# Patient Record
Sex: MALE | Race: ASIAN | Employment: UNEMPLOYED | URBAN - METROPOLITAN AREA
[De-identification: names, ages, dates, MRNs, and addresses within clinical notes are randomized per-mention and may not be internally consistent; named-entity substitution may affect disease eponyms.]

---

## 2017-02-16 ENCOUNTER — OFFICE VISIT (OUTPATIENT)
Dept: INTERNAL MEDICINE CLINIC | Age: 37
End: 2017-02-16

## 2017-02-16 VITALS
SYSTOLIC BLOOD PRESSURE: 162 MMHG | TEMPERATURE: 98 F | OXYGEN SATURATION: 97 % | HEIGHT: 63 IN | DIASTOLIC BLOOD PRESSURE: 107 MMHG | HEART RATE: 70 BPM | RESPIRATION RATE: 16 BRPM | BODY MASS INDEX: 32.39 KG/M2 | WEIGHT: 182.8 LBS

## 2017-02-16 DIAGNOSIS — Z00.00 WELL ADULT EXAM: Primary | ICD-10-CM

## 2017-02-16 NOTE — PATIENT INSTRUCTIONS
Learning About Living Avril  What is a living will? A living will is a legal form you use to write down the kind of care you want at the end of your life. It is used by the health professionals who will treat you if you aren't able to decide for yourself. If you put your wishes in writing, your loved ones and others will know what kind of care you want. They won't need to guess. This can ease your mind and be helpful to others. A living will is not the same as an estate or property will. An estate will explains what you want to happen with your money and property after you die. Is a living will a legal document? A living will is a legal document. Each state has its own laws about living amaya. If you move to another state, make sure that your living will is legal in the state where you now live. Or you might use a universal form that has been approved by many states. This kind of form can sometimes be completed and stored online. Your electronic copy will then be available wherever you have a connection to the Internet. In most cases, doctors will respect your wishes even if you have a form from a different state. · You don't need an  to complete a living will. But legal advice can be helpful if your state's laws are unclear, your health history is complicated, or your family can't agree on what should be in your living will. · You can change your living will at any time. Some people find that their wishes about end-of-life care change as their health changes. · In addition to making a living will, think about completing a medical power of  form. This form lets you name the person you want to make end-of-life treatment decisions for you (your \"health care agent\") if you're not able to. Many hospitals and nursing homes will give you the forms you need to complete a living will and a medical power of .   · Your living will is used only if you can't make or communicate decisions for yourself anymore. If you become able to make decisions again, you can accept or refuse any treatment, no matter what you wrote in your living will. · Your state may offer an online registry. This is a place where you can store your living will online so the doctors and nurses who need to treat you can find it right away. What should you think about when creating a living will? Talk about your end-of-life wishes with your family members and your doctor. Let them know what you want. That way the people making decisions for you won't be surprised by your choices. Think about these questions as you make your living will:  · Do you know enough about life support methods that might be used? If not, talk to your doctor so you know what might be done if you can't breathe on your own, your heart stops, or you're unable to swallow. · What things would you still want to be able to do after you receive life-support methods? Would you want to be able to walk? To speak? To eat on your own? To live without the help of machines? · If you have a choice, where do you want to be cared for? In your home? At a hospital or nursing home? · Do you want certain Jew practices performed if you become very ill? · If you have a choice at the end of your life, where would you prefer to die? At home? In a hospital or nursing home? Somewhere else? · Would you prefer to be buried or cremated? · Do you want your organs to be donated after you die? What should you do with your living will? · Make sure that your family members and your health care agent have copies of your living will. · Give your doctor a copy of your living will to keep in your medical record. If you have more than one doctor, make sure that each one has a copy. · You may want to put a copy of your living will where it can be easily found. Where can you learn more? Go to http://joby-carter.info/.   Enter I732 in the search box to learn more about \"Learning About Living Avril. \"  Current as of: February 24, 2016  Content Version: 11.1  © 6344-7515 [employer]. Care instructions adapted under license by [employer] (which disclaims liability or warranty for this information). If you have questions about a medical condition or this instruction, always ask your healthcare professional. [address] any warranty or liability for your use of this information. Well Visit, Ages 25 to 48: Care Instructions  Your Care Instructions  Physical exams can help you stay healthy. Your doctor has checked your overall health and may have suggested ways to take good care of yourself. He or she also may have recommended tests. At home, you can help prevent illness with healthy eating, regular exercise, and other steps. Follow-up care is a key part of your treatment and safety. Be sure to make and go to all appointments, and call your doctor if you are having problems. It's also a good idea to know your test results and keep a list of the medicines you take. How can you care for yourself at home? · Reach and stay at a healthy weight. This will lower your risk for many problems, such as obesity, diabetes, heart disease, and high blood pressure. · Get at least 30 minutes of physical activity on most days of the week. Walking is a good choice. You also may want to do other activities, such as running, swimming, cycling, or playing tennis or team sports. Discuss any changes in your exercise program with your doctor. · Do not smoke or allow others to smoke around you. If you need help quitting, talk to your doctor about stop-smoking programs and medicines. These can increase your chances of quitting for good. · Talk to your doctor about whether you have any risk factors for sexually transmitted infections (STIs).  Having one sex partner (who does not have STIs and does not have sex with anyone else) is a good way to avoid these infections. · Use birth control if you do not want to have children at this time. Talk with your doctor about the choices available and what might be best for you. · Protect your skin from too much sun. When you're outdoors from 10 a.m. to 4 p.m., stay in the shade or cover up with clothing and a hat with a wide brim. Wear sunglasses that block UV rays. Even when it's cloudy, put broad-spectrum sunscreen (SPF 30 or higher) on any exposed skin. · See a dentist one or two times a year for checkups and to have your teeth cleaned. · Wear a seat belt in the car. · Drink alcohol in moderation, if at all. That means no more than 2 drinks a day for men and 1 drink a day for women. Follow your doctor's advice about when to have certain tests. These tests can spot problems early. For everyone  · Cholesterol. Have the fat (cholesterol) in your blood tested after age 21. Your doctor will tell you how often to have this done based on your age, family history, or other things that can increase your risk for heart disease. · Blood pressure. Have your blood pressure checked during a routine doctor visit. Your doctor will tell you how often to check your blood pressure based on your age, your blood pressure results, and other factors. · Vision. Talk with your doctor about how often to have a glaucoma test.  · Diabetes. Ask your doctor whether you should have tests for diabetes. · Colon cancer. Have a test for colon cancer at age 48. You may have one of several tests. If you are younger than 48, you may need a test earlier if you have any risk factors. Risk factors include whether you already had a precancerous polyp removed from your colon or whether your parent, brother, sister, or child has had colon cancer.   For women  · Breast exam and mammogram. Talk to your doctor about when you should have a clinical breast exam and a mammogram. Medical experts differ on whether and how often women under 50 should have these tests. Your doctor can help you decide what is right for you. · Pap test and pelvic exam. Begin Pap tests at age 24. A Pap test is the best way to find cervical cancer. The test often is part of a pelvic exam. Ask how often to have this test.  · Tests for sexually transmitted infections (STIs). Ask whether you should have tests for STIs. You may be at risk if you have sex with more than one person, especially if your partners do not wear condoms. For men  · Tests for sexually transmitted infections (STIs). Ask whether you should have tests for STIs. You may be at risk if you have sex with more than one person, especially if you do not wear a condom. · Testicular cancer exam. Ask your doctor whether you should check your testicles regularly. · Prostate exam. Talk to your doctor about whether you should have a blood test (called a PSA test) for prostate cancer. Experts differ on whether and when men should have this test. Some experts suggest it if you are older than 39 and are -American or have a father or brother who got prostate cancer when he was younger than 72. When should you call for help? Watch closely for changes in your health, and be sure to contact your doctor if you have any problems or symptoms that concern you. Where can you learn more? Go to http://joby-carter.info/. Enter P072 in the search box to learn more about \"Well Visit, Ages 25 to 48: Care Instructions. \"  Current as of: July 19, 2016  Content Version: 11.1  © 7730-2720 [employer], [employer]. Care instructions adapted under license by [employer] (which disclaims liability or warranty for this information). If you have questions about a medical condition or this instruction, always ask your healthcare professional. [place] [address] any warranty or liability for your use of this information.        DASH Diet: Care Instructions  Your Care Instructions  The DASH diet is an eating plan that can help lower your blood pressure. DASH stands for Dietary Approaches to Stop Hypertension. Hypertension is high blood pressure. The DASH diet focuses on eating foods that are high in calcium, potassium, and magnesium. These nutrients can lower blood pressure. The foods that are highest in these nutrients are fruits, vegetables, low-fat dairy products, nuts, seeds, and legumes. But taking calcium, potassium, and magnesium supplements instead of eating foods that are high in those nutrients does not have the same effect. The DASH diet also includes whole grains, fish, and poultry. The DASH diet is one of several lifestyle changes your doctor may recommend to lower your high blood pressure. Your doctor may also want you to decrease the amount of sodium in your diet. Lowering sodium while following the DASH diet can lower blood pressure even further than just the DASH diet alone. Follow-up care is a key part of your treatment and safety. Be sure to make and go to all appointments, and call your doctor if you are having problems. It's also a good idea to know your test results and keep a list of the medicines you take. How can you care for yourself at home? Following the DASH diet  · Eat 4 to 5 servings of fruit each day. A serving is 1 medium-sized piece of fruit, ½ cup chopped or canned fruit, 1/4 cup dried fruit, or 4 ounces (½ cup) of fruit juice. Choose fruit more often than fruit juice. · Eat 4 to 5 servings of vegetables each day. A serving is 1 cup of lettuce or raw leafy vegetables, ½ cup of chopped or cooked vegetables, or 4 ounces (½ cup) of vegetable juice. Choose vegetables more often than vegetable juice. · Get 2 to 3 servings of low-fat and fat-free dairy each day. A serving is 8 ounces of milk, 1 cup of yogurt, or 1 ½ ounces of cheese. · Eat 6 to 8 servings of grains each day.  A serving is 1 slice of bread, 1 ounce of dry cereal, or ½ cup of cooked rice, pasta, or cooked cereal. Try to choose whole-grain products as much as possible. · Limit lean meat, poultry, and fish to 2 servings each day. A serving is 3 ounces, about the size of a deck of cards. · Eat 4 to 5 servings of nuts, seeds, and legumes (cooked dried beans, lentils, and split peas) each week. A serving is 1/3 cup of nuts, 2 tablespoons of seeds, or ½ cup of cooked beans or peas. · Limit fats and oils to 2 to 3 servings each day. A serving is 1 teaspoon of vegetable oil or 2 tablespoons of salad dressing. · Limit sweets and added sugars to 5 servings or less a week. A serving is 1 tablespoon jelly or jam, ½ cup sorbet, or 1 cup of lemonade. · Eat less than 2,300 milligrams (mg) of sodium a day. If you limit your sodium to 1,500 mg a day, you can lower your blood pressure even more. Tips for success  · Start small. Do not try to make dramatic changes to your diet all at once. You might feel that you are missing out on your favorite foods and then be more likely to not follow the plan. Make small changes, and stick with them. Once those changes become habit, add a few more changes. · Try some of the following:  ¨ Make it a goal to eat a fruit or vegetable at every meal and at snacks. This will make it easy to get the recommended amount of fruits and vegetables each day. ¨ Try yogurt topped with fruit and nuts for a snack or healthy dessert. ¨ Add lettuce, tomato, cucumber, and onion to sandwiches. ¨ Combine a ready-made pizza crust with low-fat mozzarella cheese and lots of vegetable toppings. Try using tomatoes, squash, spinach, broccoli, carrots, cauliflower, and onions. ¨ Have a variety of cut-up vegetables with a low-fat dip as an appetizer instead of chips and dip. ¨ Sprinkle sunflower seeds or chopped almonds over salads. Or try adding chopped walnuts or almonds to cooked vegetables. ¨ Try some vegetarian meals using beans and peas. Add garbanzo or kidney beans to salads.  Make burritos and tacos with mashed davis beans or black beans. Where can you learn more? Go to http://joby-carter.info/. Enter U704 in the search box to learn more about \"DASH Diet: Care Instructions. \"  Current as of: March 23, 2016  Content Version: 11.1  © 5950-4392 [employer]. Care instructions adapted under license by [employer] (which disclaims liability or warranty for this information). If you have questions about a medical condition or this instruction, always ask your healthcare professional. [address] any warranty or liability for your use of this information. Learning About High Blood Pressure  What is high blood pressure? Blood pressure is a measure of how hard the blood pushes against the walls of your arteries. It's normal for blood pressure to go up and down throughout the day, but if it stays up, you have high blood pressure. Another name for high blood pressure is hypertension. Two numbers tell you your blood pressure. The first number is the systolic pressure. It shows how hard the blood pushes when your heart is pumping. The second number is the diastolic pressure. It shows how hard the blood pushes between heartbeats, when your heart is relaxed and filling with blood. A blood pressure of less than 120/80 (say \"120 over 80\") is ideal for an adult. High blood pressure is 140/90 or higher. You have high blood pressure if your top number is 140 or higher or your bottom number is 90 or higher, or both. Many people fall into the category in between, called prehypertension. People with prehypertension need to make lifestyle changes to bring their blood pressure down and help prevent or delay high blood pressure. What happens when you have high blood pressure? · Blood flows through your arteries with too much force. Over time, this damages the walls of your arteries. But you can't feel it. High blood pressure usually doesn't cause symptoms.   · Fat and calcium start to build up in your arteries. This buildup is called plaque. Plaque makes your arteries narrower and stiffer. Blood can't flow through them as easily. · This lack of good blood flow starts to damage some of the organs in your body. This can lead to problems such as coronary artery disease and heart attack, heart failure, stroke, kidney failure, and eye damage. How can you prevent high blood pressure? · Stay at a healthy weight. · Try to limit how much sodium you eat to less than 2,300 milligrams (mg) a day. If you limit your sodium to 1,500 mg a day, you can lower your blood pressure even more. ¨ Buy foods that are labeled \"unsalted,\" \"sodium-free,\" or \"low-sodium. \" Foods labeled \"reduced-sodium\" and \"light sodium\" may still have too much sodium. ¨ Flavor your food with garlic, lemon juice, onion, vinegar, herbs, and spices instead of salt. Do not use soy sauce, steak sauce, onion salt, garlic salt, mustard, or ketchup on your food. ¨ Use less salt (or none) when recipes call for it. You can often use half the salt a recipe calls for without losing flavor. · Be physically active. Get at least 30 minutes of exercise on most days of the week. Walking is a good choice. You also may want to do other activities, such as running, swimming, cycling, or playing tennis or team sports. · Limit alcohol to 2 drinks a day for men and 1 drink a day for women. · Eat plenty of fruits, vegetables, and low-fat dairy products. Eat less saturated and total fats. How is high blood pressure treated? · Your doctor will suggest making lifestyle changes. For example, your doctor may ask you to eat healthy foods, quit smoking, lose extra weight, and be more active. · If lifestyle changes don't help enough or your blood pressure is very high, you will have to take medicine every day. Follow-up care is a key part of your treatment and safety.  Be sure to make and go to all appointments, and call your doctor if you are having problems. It's also a good idea to know your test results and keep a list of the medicines you take. Where can you learn more? Go to http://joby-carter.info/. Enter P501 in the search box to learn more about \"Learning About High Blood Pressure. \"  Current as of: March 23, 2016  Content Version: 11.1  © 9825-5441 [employer], [employer]. Care instructions adapted under license by [email or web address] (which disclaims liability or warranty for this information). If you have questions about a medical condition or this instruction, always ask your healthcare professional. [place] [address] any warranty or liability for your use of this information.

## 2017-02-16 NOTE — PROGRESS NOTES
HISTORY OF PRESENT ILLNESS  Marlen Carson is a 29 y.o. male. HPI  Here for physical.    Last visit Feb 2015. Has had elev BP in past--elevated today as below. Had improved with diet/exercist prior. He notes no interim BP checks since last seen here. Has seen ophth and working/waiting on glasses to help with vision. He completed LTBI therapy with health dept. Release(s) completed at visit for:  Health dept LTBI tx records. Reviewed BP elevation and evaluaiton/tx. Plan follow-up as below. ROS      Blood pressure (!) 158/112, pulse 76, temperature 98 °F (36.7 °C), temperature source Oral, resp. rate 16, height 5' 2.5\" (1.588 m), weight 182 lb 12.8 oz (82.9 kg), SpO2 97 %. Vitals:    02/16/17 1002 02/16/17 1007 02/16/17 1043   BP: (!) 155/105 (!) 158/112 (!) 162/107   Pulse: 70 76 70   Resp: 16     Temp: 98 °F (36.7 °C)     TempSrc: Oral     SpO2: 97%     Weight: 182 lb 12.8 oz (82.9 kg)     Height: 5' 2.5\" (1.588 m)         Physical Exam   Constitutional: He appears well-developed and well-nourished. No distress. HENT:   Head: Normocephalic and atraumatic. Right Ear: External ear normal.   Left Ear: External ear normal.   Nose: Nose normal.   Mouth/Throat: Oropharynx is clear and moist. No oropharyngeal exudate. TM's normal bilat. Mild injection canal just superior to TM right. Eyes: Conjunctivae are normal. Right eye exhibits no discharge. Left eye exhibits no discharge. No scleral icterus. Neck: Normal range of motion. Neck supple. No tracheal deviation present. No thyromegaly present. Cardiovascular: Normal rate, regular rhythm, normal heart sounds and intact distal pulses. Exam reveals no gallop and no friction rub. No murmur heard. Pulmonary/Chest: Effort normal and breath sounds normal. No stridor. No respiratory distress. He has no wheezes. He has no rales. Abdominal: Soft. Bowel sounds are normal. He exhibits no distension. There is no tenderness.  There is no rebound and no guarding. Genitourinary:   Genitourinary Comments: Pt deferred--no concerns. Musculoskeletal: He exhibits no edema or tenderness. Lymphadenopathy:     He has no cervical adenopathy. Neurological: He is alert. He exhibits normal muscle tone. Coordination normal.   Skin: Skin is warm. No rash noted. He is not diaphoretic. No erythema. No pallor. Psychiatric: He has a normal mood and affect. His behavior is normal. Judgment and thought content normal.       ASSESSMENT and PLAN    ICD-10-CM ICD-9-CM    1. Well adult exam Z00.00 V70.0 CBC WITH AUTOMATED DIFF      METABOLIC PANEL, COMPREHENSIVE      LIPID PANEL      HEMOGLOBIN A1C WITH EAG   2. Elevated BP I10 401.9        1. [facility] labs reviewed. 2.  To return for re-evaluation in 1-2wk. Pt agreeable with plan. Reviewed routine for confirmation elevation and treatment. Reviewed interval follow-up if needs BP meds at follow-up. Follow-up Disposition:  Return in about 2 weeks (around 3/2/2017) for Blood Pressure follow-up, lab review--1-2 weeks. lab results and schedule of future lab studies reviewed with patient  reviewed diet, exercise and weight control  reviewed medications and side effects in detail    Plan and evaluation (above) reviewed with pt at visit  Patient voiced understanding of plan and provided with time to ask/review questions. After Visit Summary (AVS) provided to pt after visit with additional instructions as needed/reviewed.

## 2017-02-16 NOTE — PROGRESS NOTES
Rm 16  Pt has been fasting today  Pt bp elevated ,left arm . BP repeated in right arm still elevated  Chief Complaint   Patient presents with    Complete Physical       1. Have you been to the ER, urgent care clinic since your last visit? Hospitalized since your last visit? No    2. Have you seen or consulted any other health care providers outside of the [address] [place] [address] since your last visit? Include any pap smears or colon screening.  No    Health Maintenance Due   Topic Date Due    Pneumococcal 19-64 Medium Risk (1 of 1 - PPSV23) 05/21/2000     Living will sent to pt AVS

## 2017-02-16 NOTE — MR AVS SNAPSHOT
Visit Information Date & Time Provider Department Dept. Phone Encounter #  
 2/16/2017  9:30 AM Rosetta Schwarz, [address] [place] [address] and Internal Medicine [phone] [id number] Follow-up Instructions Return in about 2 weeks (around 3/2/2017) for Blood Pressure follow-up, lab review--1-2 weeks. Upcoming Health Maintenance Date Due Pneumococcal 19-64 Medium Risk (1 of 1 - PPSV23) 5/21/2000 DTaP/Tdap/Td series (2 - Td) 10/27/2024 Allergies as of 2/16/2017  Review Complete On: 2/16/2017 By: Rosetta Schwarz MD  
 No Known Allergies Current Immunizations  Reviewed on 2/16/2017 Name Date Hep B Vaccine 12/27/2013, 11/29/2013 Hep B Vaccine (Adult) 1/12/2015 Influenza Vaccine 11/12/2014, 10/27/2014, 4/10/2014 MMR 12/27/2013, 11/29/2013 Td 10/27/2014, 11/29/2013 Tdap 4/10/2014 Reviewed by Rosetta Schwarz MD on 2/16/2017 at 10:23 AM  
You Were Diagnosed With   
  
 Codes Comments Well adult exam    -  Primary ICD-10-CM: Z00.00 ICD-9-CM: V70.0 Elevated BP     ICD-10-CM: I10 
ICD-9-CM: 401.9 Vitals BP Pulse Temp Resp Height(growth percentile) Weight(growth percentile) (!) 162/107 70 98 °F (36.7 °C) (Oral) 16 5' 2.5\" (1.588 m) 182 lb 12.8 oz (82.9 kg) SpO2 BMI Smoking Status 97% 32.9 kg/m2 Current Some Day Smoker Vitals History BMI and BSA Data Body Mass Index Body Surface Area 32.9 kg/m 2 1.91 m 2 Preferred Pharmacy Pharmacy Name Phone [id number] [phone] Your Updated Medication List  
  
Notice  As of 2/16/2017 10:44 AM  
 You have not been prescribed any medications. We Performed the Following CBC WITH AUTOMATED DIFF [28903 CPT(R)] HEMOGLOBIN A1C WITH EAG [07429 CPT(R)] LIPID PANEL [96237 CPT(R)] METABOLIC PANEL, COMPREHENSIVE [46763 CPT(R)] Follow-up Instructions Return in about 2 weeks (around 3/2/2017) for Blood Pressure follow-up, lab review--1-2 weeks. Patient Instructions Renée Villatoro 1721 What is a living will? A living will is a legal form you use to write down the kind of care you want at the end of your life. It is used by the health professionals who will treat you if you aren't able to decide for yourself. If you put your wishes in writing, your loved ones and others will know what kind of care you want. They won't need to guess. This can ease your mind and be helpful to others. A living will is not the same as an estate or property will. An estate will explains what you want to happen with your money and property after you die. Is a living will a legal document? A living will is a legal document. Each state has its own laws about living amaya. If you move to another state, make sure that your living will is legal in the state where you now live. Or you might use a universal form that has been approved by many states. This kind of form can sometimes be completed and stored online. Your electronic copy will then be available wherever you have a connection to the Internet. In most cases, doctors will respect your wishes even if you have a form from a different state. · You don't need an  to complete a living will. But legal advice can be helpful if your state's laws are unclear, your health history is complicated, or your family can't agree on what should be in your living will. · You can change your living will at any time. Some people find that their wishes about end-of-life care change as their health changes. · In addition to making a living will, think about completing a medical power of  form. This form lets you name the person you want to make end-of-life treatment decisions for you (your \"health care agent\") if you're not able to.  Many hospitals and nursing homes will give you the forms you need to complete a living will and a medical power of . · Your living will is used only if you can't make or communicate decisions for yourself anymore. If you become able to make decisions again, you can accept or refuse any treatment, no matter what you wrote in your living will. · Your state may offer an online registry. This is a place where you can store your living will online so the doctors and nurses who need to treat you can find it right away. What should you think about when creating a living will? Talk about your end-of-life wishes with your family members and your doctor. Let them know what you want. That way the people making decisions for you won't be surprised by your choices. Think about these questions as you make your living will: · Do you know enough about life support methods that might be used? If not, talk to your doctor so you know what might be done if you can't breathe on your own, your heart stops, or you're unable to swallow. · What things would you still want to be able to do after you receive life-support methods? Would you want to be able to walk? To speak? To eat on your own? To live without the help of machines? · If you have a choice, where do you want to be cared for? In your home? At a hospital or nursing home? · Do you want certain Lutheran practices performed if you become very ill? · If you have a choice at the end of your life, where would you prefer to die? At home? In a hospital or nursing home? Somewhere else? · Would you prefer to be buried or cremated? · Do you want your organs to be donated after you die? What should you do with your living will? · Make sure that your family members and your health care agent have copies of your living will. · Give your doctor a copy of your living will to keep in your medical record. If you have more than one doctor, make sure that each one has a copy. · You may want to put a copy of your living will where it can be easily found. Where can you learn more? Go to http://joby-carter.info/. Enter G930 in the search box to learn more about \"Learning About Living Avril. \" Current as of: February 24, 2016 Content Version: 11.1 © 4154-6720 [employer]. Care instructions adapted under license by [employer] (which disclaims liability or warranty for this information). If you have questions about a medical condition or this instruction, always ask your healthcare professional. [place] [address] any warranty or liability for your use of this information. Well Visit, Ages 25 to 48: Care Instructions Your Care Instructions Physical exams can help you stay healthy. Your doctor has checked your overall health and may have suggested ways to take good care of yourself. He or she also may have recommended tests. At home, you can help prevent illness with healthy eating, regular exercise, and other steps. Follow-up care is a key part of your treatment and safety. Be sure to make and go to all appointments, and call your doctor if you are having problems. It's also a good idea to know your test results and keep a list of the medicines you take. How can you care for yourself at home? · Reach and stay at a healthy weight. This will lower your risk for many problems, such as obesity, diabetes, heart disease, and high blood pressure. · Get at least 30 minutes of physical activity on most days of the week. Walking is a good choice. You also may want to do other activities, such as running, swimming, cycling, or playing tennis or team sports. Discuss any changes in your exercise program with your doctor. · Do not smoke or allow others to smoke around you. If you need help quitting, talk to your doctor about stop-smoking programs and medicines. These can increase your chances of quitting for good. · Talk to your doctor about whether you have any risk factors for sexually transmitted infections (STIs). Having one sex partner (who does not have STIs and does not have sex with anyone else) is a good way to avoid these infections. · Use birth control if you do not want to have children at this time. Talk with your doctor about the choices available and what might be best for you. · Protect your skin from too much sun. When you're outdoors from 10 a.m. to 4 p.m., stay in the shade or cover up with clothing and a hat with a wide brim. Wear sunglasses that block UV rays. Even when it's cloudy, put broad-spectrum sunscreen (SPF 30 or higher) on any exposed skin. · See a dentist one or two times a year for checkups and to have your teeth cleaned. · Wear a seat belt in the car. · Drink alcohol in moderation, if at all. That means no more than 2 drinks a day for men and 1 drink a day for women. Follow your doctor's advice about when to have certain tests. These tests can spot problems early. For everyone · Cholesterol. Have the fat (cholesterol) in your blood tested after age 21. Your doctor will tell you how often to have this done based on your age, family history, or other things that can increase your risk for heart disease. · Blood pressure. Have your blood pressure checked during a routine doctor visit. Your doctor will tell you how often to check your blood pressure based on your age, your blood pressure results, and other factors. · Vision. Talk with your doctor about how often to have a glaucoma test. 
· Diabetes. Ask your doctor whether you should have tests for diabetes. · Colon cancer. Have a test for colon cancer at age 48. You may have one of several tests. If you are younger than 48, you may need a test earlier if you have any risk factors.  Risk factors include whether you already had a precancerous polyp removed from your colon or whether your parent, brother, sister, or child has had colon cancer. For women · Breast exam and mammogram. Talk to your doctor about when you should have a clinical breast exam and a mammogram. Medical experts differ on whether and how often women under 50 should have these tests. Your doctor can help you decide what is right for you. · Pap test and pelvic exam. Begin Pap tests at age 24. A Pap test is the best way to find cervical cancer. The test often is part of a pelvic exam. Ask how often to have this test. 
· Tests for sexually transmitted infections (STIs). Ask whether you should have tests for STIs. You may be at risk if you have sex with more than one person, especially if your partners do not wear condoms. For men · Tests for sexually transmitted infections (STIs). Ask whether you should have tests for STIs. You may be at risk if you have sex with more than one person, especially if you do not wear a condom. · Testicular cancer exam. Ask your doctor whether you should check your testicles regularly. · Prostate exam. Talk to your doctor about whether you should have a blood test (called a PSA test) for prostate cancer. Experts differ on whether and when men should have this test. Some experts suggest it if you are older than 39 and are -American or have a father or brother who got prostate cancer when he was younger than 72. When should you call for help? Watch closely for changes in your health, and be sure to contact your doctor if you have any problems or symptoms that concern you. Where can you learn more? Go to http://joby-carter.info/. Enter P072 in the search box to learn more about \"Well Visit, Ages 25 to 48: Care Instructions. \" Current as of: July 19, 2016 Content Version: 11.1 © 4452-2752 [employer].  Care instructions adapted under license by [employer] (which disclaims liability or warranty for this information). If you have questions about a medical condition or this instruction, always ask your healthcare professional. [address] any warranty or liability for your use of this information. DASH Diet: Care Instructions Your Care Instructions The DASH diet is an eating plan that can help lower your blood pressure. DASH stands for Dietary Approaches to Stop Hypertension. Hypertension is high blood pressure. The DASH diet focuses on eating foods that are high in calcium, potassium, and magnesium. These nutrients can lower blood pressure. The foods that are highest in these nutrients are fruits, vegetables, low-fat dairy products, nuts, seeds, and legumes. But taking calcium, potassium, and magnesium supplements instead of eating foods that are high in those nutrients does not have the same effect. The DASH diet also includes whole grains, fish, and poultry. The DASH diet is one of several lifestyle changes your doctor may recommend to lower your high blood pressure. Your doctor may also want you to decrease the amount of sodium in your diet. Lowering sodium while following the DASH diet can lower blood pressure even further than just the DASH diet alone. Follow-up care is a key part of your treatment and safety. Be sure to make and go to all appointments, and call your doctor if you are having problems. It's also a good idea to know your test results and keep a list of the medicines you take. How can you care for yourself at home? Following the DASH diet · Eat 4 to 5 servings of fruit each day. A serving is 1 medium-sized piece of fruit, ½ cup chopped or canned fruit, 1/4 cup dried fruit, or 4 ounces (½ cup) of fruit juice. Choose fruit more often than fruit juice. · Eat 4 to 5 servings of vegetables each day.  A serving is 1 cup of lettuce or raw leafy vegetables, ½ cup of chopped or cooked vegetables, or 4 ounces (½ cup) of vegetable juice. Choose vegetables more often than vegetable juice. · Get 2 to 3 servings of low-fat and fat-free dairy each day. A serving is 8 ounces of milk, 1 cup of yogurt, or 1 ½ ounces of cheese. · Eat 6 to 8 servings of grains each day. A serving is 1 slice of bread, 1 ounce of dry cereal, or ½ cup of cooked rice, pasta, or cooked cereal. Try to choose whole-grain products as much as possible. · Limit lean meat, poultry, and fish to 2 servings each day. A serving is 3 ounces, about the size of a deck of cards. · Eat 4 to 5 servings of nuts, seeds, and legumes (cooked dried beans, lentils, and split peas) each week. A serving is 1/3 cup of nuts, 2 tablespoons of seeds, or ½ cup of cooked beans or peas. · Limit fats and oils to 2 to 3 servings each day. A serving is 1 teaspoon of vegetable oil or 2 tablespoons of salad dressing. · Limit sweets and added sugars to 5 servings or less a week. A serving is 1 tablespoon jelly or jam, ½ cup sorbet, or 1 cup of lemonade. · Eat less than 2,300 milligrams (mg) of sodium a day. If you limit your sodium to 1,500 mg a day, you can lower your blood pressure even more. Tips for success · Start small. Do not try to make dramatic changes to your diet all at once. You might feel that you are missing out on your favorite foods and then be more likely to not follow the plan. Make small changes, and stick with them. Once those changes become habit, add a few more changes. · Try some of the following: ¨ Make it a goal to eat a fruit or vegetable at every meal and at snacks. This will make it easy to get the recommended amount of fruits and vegetables each day. ¨ Try yogurt topped with fruit and nuts for a snack or healthy dessert. ¨ Add lettuce, tomato, cucumber, and onion to sandwiches. ¨ Combine a ready-made pizza crust with low-fat mozzarella cheese and lots of vegetable toppings.  Try using tomatoes, squash, spinach, broccoli, carrots, cauliflower, and onions. ¨ Have a variety of cut-up vegetables with a low-fat dip as an appetizer instead of chips and dip. ¨ Sprinkle sunflower seeds or chopped almonds over salads. Or try adding chopped walnuts or almonds to cooked vegetables. ¨ Try some vegetarian meals using beans and peas. Add garbanzo or kidney beans to salads. Make burritos and tacos with mashed davis beans or black beans. Where can you learn more? Go to http://joby"[employer]carter.info/. Enter N055 in the search box to learn more about \"DASH Diet: Care Instructions. \" Current as of: March 23, 2016 Content Version: 11.1 © 1561-0168 [employer]. Care instructions adapted under license by [employer] (which disclaims liability or warranty for this information). If you have questions about a medical condition or this instruction, always ask your healthcare professional. [place] [address] any warranty or liability for your use of this information. Learning About High Blood Pressure What is high blood pressure? Blood pressure is a measure of how hard the blood pushes against the walls of your arteries. It's normal for blood pressure to go up and down throughout the day, but if it stays up, you have high blood pressure. Another name for high blood pressure is hypertension. Two numbers tell you your blood pressure. The first number is the systolic pressure. It shows how hard the blood pushes when your heart is pumping. The second number is the diastolic pressure. It shows how hard the blood pushes between heartbeats, when your heart is relaxed and filling with blood. A blood pressure of less than 120/80 (say \"120 over 80\") is ideal for an adult. High blood pressure is 140/90 or higher. You have high blood pressure if your top number is 140 or higher or your bottom number is 90 or higher, or both.  Many people fall into the category in between, called prehypertension. People with prehypertension need to make lifestyle changes to bring their blood pressure down and help prevent or delay high blood pressure. What happens when you have high blood pressure? · Blood flows through your arteries with too much force. Over time, this damages the walls of your arteries. But you can't feel it. High blood pressure usually doesn't cause symptoms. · Fat and calcium start to build up in your arteries. This buildup is called plaque. Plaque makes your arteries narrower and stiffer. Blood can't flow through them as easily. · This lack of good blood flow starts to damage some of the organs in your body. This can lead to problems such as coronary artery disease and heart attack, heart failure, stroke, kidney failure, and eye damage. How can you prevent high blood pressure? · Stay at a healthy weight. · Try to limit how much sodium you eat to less than 2,300 milligrams (mg) a day. If you limit your sodium to 1,500 mg a day, you can lower your blood pressure even more. ¨ Buy foods that are labeled \"unsalted,\" \"sodium-free,\" or \"low-sodium. \" Foods labeled \"reduced-sodium\" and \"light sodium\" may still have too much sodium. ¨ Flavor your food with garlic, lemon juice, onion, vinegar, herbs, and spices instead of salt. Do not use soy sauce, steak sauce, onion salt, garlic salt, mustard, or ketchup on your food. ¨ Use less salt (or none) when recipes call for it. You can often use half the salt a recipe calls for without losing flavor. · Be physically active. Get at least 30 minutes of exercise on most days of the week. Walking is a good choice. You also may want to do other activities, such as running, swimming, cycling, or playing tennis or team sports. · Limit alcohol to 2 drinks a day for men and 1 drink a day for women. · Eat plenty of fruits, vegetables, and low-fat dairy products. Eat less saturated and total fats. How is high blood pressure treated? · Your doctor will suggest making lifestyle changes. For example, your doctor may ask you to eat healthy foods, quit smoking, lose extra weight, and be more active. · If lifestyle changes don't help enough or your blood pressure is very high, you will have to take medicine every day. Follow-up care is a key part of your treatment and safety. Be sure to make and go to all appointments, and call your doctor if you are having problems. It's also a good idea to know your test results and keep a list of the medicines you take. Where can you learn more? Go to http://joby-carter.info/. Enter P501 in the search box to learn more about \"Learning About High Blood Pressure. \" Current as of: March 23, 2016 Content Version: 11.1 © 7697-1803 [employer]. Care instructions adapted under license by [employer] (which disclaims liability or warranty for this information). If you have questions about a medical condition or this instruction, always ask your healthcare professional. [place] [address] any warranty or liability for your use of this information. Introducing [facility]! [address] [facility] introduces Fortnox patient portal. Now you can access parts of your medical record, email your doctor's office, and request medication refills online. 1. In your internet browser, go to [email or web address] 2. Click on the First Time User? Click Here link in the Sign In box. You will see the New Member Sign Up page. 3. Enter your Fortnox Access Code exactly as it appears below. You will not need to use this code after youve completed the sign-up process. If you do not sign up before the expiration date, you must request a new code. · Fortnox Access Code: [email or web address] Expires: 5/17/2017  9:58 AM 
 
4.  Enter the last four digits of your Social Security Number (xxxx) and Date of Birth (mm/dd/yyyy) as indicated and click Submit. You will be taken to the next sign-up page. 5. Create a Privia ID. This will be your Privia login ID and cannot be changed, so think of one that is secure and easy to remember. 6. Create a Privia password. You can change your password at any time. 7. Enter your Password Reset Question and Answer. This can be used at a later time if you forget your password. 8. Enter your e-mail address. You will receive e-mail notification when new information is available in [address] 9. Click Sign Up. You can now view and download portions of your medical record. 10. Click the Download Summary menu link to download a portable copy of your medical information. If you have questions, please visit the Frequently Asked Questions section of the Privia website. Remember, Privia is NOT to be used for urgent needs. For medical emergencies, dial 911. Now available from your iPhone and Android! Please provide this summary of care documentation to your next provider. If you have any questions after today's visit, please call [phone].

## 2017-02-17 LAB
ALBUMIN SERPL-MCNC: 4.2 G/DL (ref 3.5–5.5)
ALBUMIN/GLOB SERPL: 1.4 {RATIO} (ref 1.1–2.5)
ALP SERPL-CCNC: 66 IU/L (ref 39–117)
ALT SERPL-CCNC: 53 IU/L (ref 0–44)
AST SERPL-CCNC: 33 IU/L (ref 0–40)
BASOPHILS # BLD AUTO: 0 X10E3/UL (ref 0–0.2)
BASOPHILS NFR BLD AUTO: 1 %
BILIRUB SERPL-MCNC: 0.6 MG/DL (ref 0–1.2)
BUN SERPL-MCNC: 6 MG/DL (ref 6–20)
BUN/CREAT SERPL: 6 (ref 8–19)
CALCIUM SERPL-MCNC: 9.2 MG/DL (ref 8.7–10.2)
CHLORIDE SERPL-SCNC: 102 MMOL/L (ref 96–106)
CHOLEST SERPL-MCNC: 198 MG/DL (ref 100–199)
CO2 SERPL-SCNC: 23 MMOL/L (ref 18–29)
CREAT SERPL-MCNC: 0.97 MG/DL (ref 0.76–1.27)
EOSINOPHIL # BLD AUTO: 0.1 X10E3/UL (ref 0–0.4)
EOSINOPHIL NFR BLD AUTO: 2 %
ERYTHROCYTE [DISTWIDTH] IN BLOOD BY AUTOMATED COUNT: 13.6 % (ref 12.3–15.4)
EST. AVERAGE GLUCOSE BLD GHB EST-MCNC: 114 MG/DL
GLOBULIN SER CALC-MCNC: 2.9 G/DL (ref 1.5–4.5)
GLUCOSE SERPL-MCNC: 84 MG/DL (ref 65–99)
HBA1C MFR BLD: 5.6 % (ref 4.8–5.6)
HCT VFR BLD AUTO: 47.6 % (ref 37.5–51)
HDLC SERPL-MCNC: 31 MG/DL
HGB BLD-MCNC: 15.6 G/DL (ref 12.6–17.7)
IMM GRANULOCYTES # BLD: 0.1 X10E3/UL (ref 0–0.1)
IMM GRANULOCYTES NFR BLD: 1 %
LDLC SERPL CALC-MCNC: 104 MG/DL (ref 0–99)
LYMPHOCYTES # BLD AUTO: 1.6 X10E3/UL (ref 0.7–3.1)
LYMPHOCYTES NFR BLD AUTO: 25 %
MCH RBC QN AUTO: 30.8 PG (ref 26.6–33)
MCHC RBC AUTO-ENTMCNC: 32.8 G/DL (ref 31.5–35.7)
MCV RBC AUTO: 94 FL (ref 79–97)
MONOCYTES # BLD AUTO: 0.5 X10E3/UL (ref 0.1–0.9)
MONOCYTES NFR BLD AUTO: 7 %
NEUTROPHILS # BLD AUTO: 4.1 X10E3/UL (ref 1.4–7)
NEUTROPHILS NFR BLD AUTO: 64 %
PLATELET # BLD AUTO: 223 X10E3/UL (ref 150–379)
POTASSIUM SERPL-SCNC: 4.2 MMOL/L (ref 3.5–5.2)
PROT SERPL-MCNC: 7.1 G/DL (ref 6–8.5)
RBC # BLD AUTO: 5.07 X10E6/UL (ref 4.14–5.8)
SODIUM SERPL-SCNC: 139 MMOL/L (ref 134–144)
TRIGL SERPL-MCNC: 313 MG/DL (ref 0–149)
VLDLC SERPL CALC-MCNC: 63 MG/DL (ref 5–40)
WBC # BLD AUTO: 6.5 X10E3/UL (ref 3.4–10.8)

## 2017-03-17 ENCOUNTER — OFFICE VISIT (OUTPATIENT)
Dept: INTERNAL MEDICINE CLINIC | Age: 37
End: 2017-03-17

## 2017-03-17 VITALS
BODY MASS INDEX: 31.82 KG/M2 | TEMPERATURE: 98.3 F | RESPIRATION RATE: 16 BRPM | SYSTOLIC BLOOD PRESSURE: 166 MMHG | DIASTOLIC BLOOD PRESSURE: 96 MMHG | HEART RATE: 84 BPM | OXYGEN SATURATION: 98 % | HEIGHT: 63 IN | WEIGHT: 179.6 LBS

## 2017-03-17 DIAGNOSIS — R68.89 FLU-LIKE SYMPTOMS: ICD-10-CM

## 2017-03-17 DIAGNOSIS — E78.1 HYPERTRIGLYCERIDEMIA: ICD-10-CM

## 2017-03-17 DIAGNOSIS — I10 ESSENTIAL HYPERTENSION: Primary | ICD-10-CM

## 2017-03-17 LAB
FLUAV+FLUBV AG NOSE QL IA.RAPID: NEGATIVE POS/NEG
FLUAV+FLUBV AG NOSE QL IA.RAPID: NEGATIVE POS/NEG
VALID INTERNAL CONTROL?: YES

## 2017-03-17 RX ORDER — LISINOPRIL 10 MG/1
10 TABLET ORAL DAILY
Qty: 30 TAB | Refills: 5 | Status: ON HOLD | OUTPATIENT
Start: 2017-03-17 | End: 2018-10-01

## 2017-03-17 NOTE — PATIENT INSTRUCTIONS
Learning About Living Avril  What is a living will? A living will is a legal form you use to write down the kind of care you want at the end of your life. It is used by the health professionals who will treat you if you aren't able to decide for yourself. If you put your wishes in writing, your loved ones and others will know what kind of care you want. They won't need to guess. This can ease your mind and be helpful to others. A living will is not the same as an estate or property will. An estate will explains what you want to happen with your money and property after you die. Is a living will a legal document? A living will is a legal document. Each state has its own laws about living amaya. If you move to another state, make sure that your living will is legal in the state where you now live. Or you might use a universal form that has been approved by many states. This kind of form can sometimes be completed and stored online. Your electronic copy will then be available wherever you have a connection to the Internet. In most cases, doctors will respect your wishes even if you have a form from a different state. · You don't need an  to complete a living will. But legal advice can be helpful if your state's laws are unclear, your health history is complicated, or your family can't agree on what should be in your living will. · You can change your living will at any time. Some people find that their wishes about end-of-life care change as their health changes. · In addition to making a living will, think about completing a medical power of  form. This form lets you name the person you want to make end-of-life treatment decisions for you (your \"health care agent\") if you're not able to. Many hospitals and nursing homes will give you the forms you need to complete a living will and a medical power of .   · Your living will is used only if you can't make or communicate decisions for yourself anymore. If you become able to make decisions again, you can accept or refuse any treatment, no matter what you wrote in your living will. · Your state may offer an online registry. This is a place where you can store your living will online so the doctors and nurses who need to treat you can find it right away. What should you think about when creating a living will? Talk about your end-of-life wishes with your family members and your doctor. Let them know what you want. That way the people making decisions for you won't be surprised by your choices. Think about these questions as you make your living will:  · Do you know enough about life support methods that might be used? If not, talk to your doctor so you know what might be done if you can't breathe on your own, your heart stops, or you're unable to swallow. · What things would you still want to be able to do after you receive life-support methods? Would you want to be able to walk? To speak? To eat on your own? To live without the help of machines? · If you have a choice, where do you want to be cared for? In your home? At a hospital or nursing home? · Do you want certain Religion practices performed if you become very ill? · If you have a choice at the end of your life, where would you prefer to die? At home? In a hospital or nursing home? Somewhere else? · Would you prefer to be buried or cremated? · Do you want your organs to be donated after you die? What should you do with your living will? · Make sure that your family members and your health care agent have copies of your living will. · Give your doctor a copy of your living will to keep in your medical record. If you have more than one doctor, make sure that each one has a copy. · You may want to put a copy of your living will where it can be easily found. Where can you learn more? Go to http://joby-carter.info/.   Enter I166 in the search box to learn more about \"Learning About Living Perroy. \"  Current as of: February 24, 2016  Content Version: 11.1  © 4551-7324 [employer]. Care instructions adapted under license by [employer] (which disclaims liability or warranty for this information). If you have questions about a medical condition or this instruction, always ask your healthcare professional. [address] any warranty or liability for your use of this information.

## 2017-03-17 NOTE — PROGRESS NOTES
HISTORY OF PRESENT ILLNESS  Alpa Renae is a 29 y.o. male. HPI  Presents for f/u BP    Also, mild URI sx    No adverse sx reported related to HTN    Father has HTN  Pt denies any snoring, apnea or daytime somnolence    Past medical, Social, and Family history reviewed  Medications reviewed and updated. ROS  Complete ROS reviewed and negative or stable except as noted in HPI. Physical Exam   Constitutional: He is oriented to person, place, and time. He appears well-nourished. No distress. HENT:   Head: Normocephalic and atraumatic. Mouth/Throat: Oropharynx is clear and moist. No oropharyngeal exudate. Eyes: EOM are normal. Pupils are equal, round, and reactive to light. No scleral icterus. Neck: Normal range of motion. Neck supple. No JVD present. No thyromegaly present. Cardiovascular: Normal rate, regular rhythm and normal heart sounds. Exam reveals no gallop and no friction rub. No murmur heard. Pulmonary/Chest: Effort normal and breath sounds normal. No respiratory distress. He has no wheezes. He has no rales. Abdominal: Soft. Bowel sounds are normal. He exhibits no distension. There is no tenderness. Musculoskeletal: Normal range of motion. He exhibits no edema. Lymphadenopathy:     He has no cervical adenopathy. Neurological: He is alert and oriented to person, place, and time. He exhibits normal muscle tone. Coordination normal.   Skin: Skin is warm. No rash noted. Psychiatric: He has a normal mood and affect. Nursing note and vitals reviewed. Flu neg  Prior labs reviewed. ASSESSMENT and PLAN    ICD-10-CM ICD-9-CM    1. Essential hypertension I10 401.9 lisinopril (PRINIVIL, ZESTRIL) 10 mg tablet   2. Flu-like symptoms R68.89 780.99 AMB POC JEWELS INFLUENZA A/B TEST   3. Hypertriglyceridemia E78.1 272.1      Follow-up Disposition:  Return in about 3 weeks (around 4/7/2017), or if symptoms worsen or fail to improve, for blood pressure.    results and schedule of future studies reviewed with patient  reviewed diet, exercise and weight   cardiovascular risk and specific lipid/LDL goals reviewed  reviewed medications and side effects in detail   Start lisinopril  Reviewed TLC

## 2017-03-17 NOTE — MR AVS SNAPSHOT
Visit Information Date & Time Provider Department Dept. Phone Encounter #  
 3/17/2017 11:00 AM Jeb Vega, [address] [place] [address], [facility] and Internal Medicine [phone] [id number] Follow-up Instructions Return in about 3 weeks (around 4/7/2017), or if symptoms worsen or fail to improve, for blood pressure. Upcoming Health Maintenance Date Due Pneumococcal 19-64 Medium Risk (1 of 1 - PPSV23) 5/21/2000 DTaP/Tdap/Td series (2 - Td) 10/27/2024 Allergies as of 3/17/2017  Review Complete On: 3/17/2017 By: Jeb Vega MD  
 No Known Allergies Current Immunizations  Reviewed on 2/16/2017 Name Date Hep B Vaccine 12/27/2013, 11/29/2013 Hep B Vaccine (Adult) 1/12/2015 Influenza Vaccine 11/12/2014, 10/27/2014, 4/10/2014 MMR 12/27/2013, 11/29/2013 Td 10/27/2014, 11/29/2013 Tdap 4/10/2014 Not reviewed this visit You Were Diagnosed With   
  
 Codes Comments Essential hypertension    -  Primary ICD-10-CM: I10 
ICD-9-CM: 401.9 Flu-like symptoms     ICD-10-CM: R68.89 ICD-9-CM: 780.99 Hypertriglyceridemia     ICD-10-CM: E78.1 ICD-9-CM: 272.1 Vitals BP Pulse Temp Resp Height(growth percentile) Weight(growth percentile) (!) 166/96 84 98.3 °F (36.8 °C) (Oral) 16 5' 2.5\" (1.588 m) 179 lb 9.6 oz (81.5 kg) SpO2 BMI Smoking Status 98% 32.33 kg/m2 Current Some Day Smoker Vitals History BMI and BSA Data Body Mass Index Body Surface Area  
 32.33 kg/m 2 1.9 m 2 Preferred Pharmacy Pharmacy Name Phone [id number] [address] [phone] Your Updated Medication List  
  
   
This list is accurate as of: 3/17/17 12:38 PM.  Always use your most recent med list.  
  
  
  
  
 lisinopril 10 mg tablet Commonly known as:  Acevedo Gold Take 1 Tab by mouth daily. Prescriptions Sent to Pharmacy Refills lisinopril (PRINIVIL, ZESTRIL) 10 mg tablet 5 Sig: Take 1 Tab by mouth daily. Class: Normal  
 Pharmacy: [id number] #: [phone] Route: Oral  
  
We Performed the Following AMB POC JEWELS INFLUENZA A/B TEST [63730 CPT(R)] Follow-up Instructions Return in about 3 weeks (around 4/7/2017), or if symptoms worsen or fail to improve, for blood pressure. Patient Instructions Renée Villatoro 1727 What is a living will? A living will is a legal form you use to write down the kind of care you want at the end of your life. It is used by the health professionals who will treat you if you aren't able to decide for yourself. If you put your wishes in writing, your loved ones and others will know what kind of care you want. They won't need to guess. This can ease your mind and be helpful to others. A living will is not the same as an estate or property will. An estate will explains what you want to happen with your money and property after you die. Is a living will a legal document? A living will is a legal document. Each state has its own laws about living amaya. If you move to another state, make sure that your living will is legal in the state where you now live. Or you might use a universal form that has been approved by many states. This kind of form can sometimes be completed and stored online. Your electronic copy will then be available wherever you have a connection to the Internet. In most cases, doctors will respect your wishes even if you have a form from a different state. · You don't need an  to complete a living will. But legal advice can be helpful if your state's laws are unclear, your health history is complicated, or your family can't agree on what should be in your living will. · You can change your living will at any time.  Some people find that their wishes about end-of-life care change as their health changes. · In addition to making a living will, think about completing a medical power of  form. This form lets you name the person you want to make end-of-life treatment decisions for you (your \"health care agent\") if you're not able to. Many hospitals and nursing homes will give you the forms you need to complete a living will and a medical power of . · Your living will is used only if you can't make or communicate decisions for yourself anymore. If you become able to make decisions again, you can accept or refuse any treatment, no matter what you wrote in your living will. · Your state may offer an online registry. This is a place where you can store your living will online so the doctors and nurses who need to treat you can find it right away. What should you think about when creating a living will? Talk about your end-of-life wishes with your family members and your doctor. Let them know what you want. That way the people making decisions for you won't be surprised by your choices. Think about these questions as you make your living will: · Do you know enough about life support methods that might be used? If not, talk to your doctor so you know what might be done if you can't breathe on your own, your heart stops, or you're unable to swallow. · What things would you still want to be able to do after you receive life-support methods? Would you want to be able to walk? To speak? To eat on your own? To live without the help of machines? · If you have a choice, where do you want to be cared for? In your home? At a hospital or nursing home? · Do you want certain Hinduism practices performed if you become very ill? · If you have a choice at the end of your life, where would you prefer to die? At home? In a hospital or nursing home? Somewhere else? · Would you prefer to be buried or cremated? · Do you want your organs to be donated after you die? What should you do with your living will? · Make sure that your family members and your health care agent have copies of your living will. · Give your doctor a copy of your living will to keep in your medical record. If you have more than one doctor, make sure that each one has a copy. · You may want to put a copy of your living will where it can be easily found. Where can you learn more? Go to http://joby-carter.info/. Enter O052 in the search box to learn more about \"Learning About Living Perroy. \" Current as of: February 24, 2016 Content Version: 11.1 © 1562-7189 [employer]. Care instructions adapted under license by [employer] (which disclaims liability or warranty for this information). If you have questions about a medical condition or this instruction, always ask your healthcare professional. [address] any warranty or liability for your use of this information. Introducing [facility]! Scott Walsh introduces [employer] patient portal. Now you can access parts of your medical record, email your doctor's office, and request medication refills online. 1. In your internet browser, go to [email or web address] 2. Click on the First Time User? Click Here link in the Sign In box. You will see the New Member Sign Up page. 3. Enter your [employer] Access Code exactly as it appears below. You will not need to use this code after youve completed the sign-up process. If you do not sign up before the expiration date, you must request a new code. · [employer] Access Code: [email or web address] Expires: 5/17/2017 10:58 AM 
 
4. Enter the last four digits of your Social Security Number (xxxx) and Date of Birth (mm/dd/yyyy) as indicated and click Submit. You will be taken to the next sign-up page. 5. Create a PowerVision ID. This will be your Power[employer] login ID and cannot be changed, so think of one that is secure and easy to remember. 6. Create a PowerV[employer] password. You can change your password at any time. 7. Enter your Password Reset Question and Answer. This can be used at a later time if you forget your password. 8. Enter your e-mail address. You will receive e-mail notification when new information is available in [address] 9. Click Sign Up. You can now view and download portions of your medical record. 10. Click the Download Summary menu link to download a portable copy of your medical information. If you have questions, please visit the Frequently Asked Questions section of the PowerVision website. Remember, [employer] is NOT to be used for urgent needs. For medical emergencies, dial 911. Now available from your iPhone and Android! Please provide this summary of care documentation to your next provider. Your primary care clinician is listed as [address]. If you have any questions after today's visit, please call [phone].

## 2017-03-17 NOTE — PROGRESS NOTES
Room 15    Chief Complaint   Patient presents with    Blood Pressure Check    Cough     productive x 2 days. No sob or tightness in chest.    Fever     x 2 days ago   Patient is currently taking no medications. B/p elevated in office today. 1. Have you been to the ER, urgent care clinic since your last visit? Hospitalized since your last visit? No    2. Have you seen or consulted any other health care providers outside of the [address] [place] [address] since your last visit? Include any pap smears or colon screening.  No     Health Maintenance Due   Topic Date Due    Pneumococcal 19-64 Medium Risk (1 of 1 - PPSV23) 05/21/2000     Information provided regarding living will today with AVS.

## 2017-07-07 ENCOUNTER — OFFICE VISIT (OUTPATIENT)
Dept: INTERNAL MEDICINE CLINIC | Age: 37
End: 2017-07-07

## 2017-07-07 NOTE — PROGRESS NOTES
Rm 13    No chief complaint on file. 1. Have you been to the ER, urgent care clinic since your last visit? Hospitalized since your last visit? {Yes when where and reason for visit:20441}    2. Have you seen or consulted any other health care providers outside of the [address] [place] [address] since your last visit? Include any pap smears or colon screening.  {Yes when where and reason for visit:20441}    Health Maintenance Due   Topic Date Due    Pneumococcal 19-64 Medium Risk (1 of 1 - PPSV23) 05/21/2000

## 2018-09-29 ENCOUNTER — APPOINTMENT (OUTPATIENT)
Dept: CT IMAGING | Age: 38
DRG: 065 | End: 2018-09-29
Attending: PHYSICIAN ASSISTANT
Payer: SUBSIDIZED

## 2018-09-29 ENCOUNTER — HOSPITAL ENCOUNTER (INPATIENT)
Age: 38
LOS: 2 days | Discharge: HOME HEALTH | DRG: 065 | End: 2018-10-02
Attending: EMERGENCY MEDICINE | Admitting: HOSPITALIST
Payer: SUBSIDIZED

## 2018-09-29 DIAGNOSIS — I63.9 ACUTE CVA (CEREBROVASCULAR ACCIDENT) (HCC): ICD-10-CM

## 2018-09-29 DIAGNOSIS — I15.9 SECONDARY HYPERTENSION: ICD-10-CM

## 2018-09-29 DIAGNOSIS — I10 HYPERTENSION, UNSPECIFIED TYPE: ICD-10-CM

## 2018-09-29 DIAGNOSIS — I10 ESSENTIAL HYPERTENSION: ICD-10-CM

## 2018-09-29 DIAGNOSIS — G45.9 TRANSIENT CEREBRAL ISCHEMIA, UNSPECIFIED TYPE: ICD-10-CM

## 2018-09-29 DIAGNOSIS — R51.9 NONINTRACTABLE HEADACHE, UNSPECIFIED CHRONICITY PATTERN, UNSPECIFIED HEADACHE TYPE: Primary | ICD-10-CM

## 2018-09-29 PROBLEM — G45.0 VERTEBROBASILAR ARTERY SYNDROME: Status: ACTIVE | Noted: 2018-09-29

## 2018-09-29 LAB
ALBUMIN SERPL-MCNC: 3.7 G/DL (ref 3.5–5)
ALBUMIN/GLOB SERPL: 1.1 {RATIO} (ref 1.1–2.2)
ALP SERPL-CCNC: 69 U/L (ref 45–117)
ALT SERPL-CCNC: 37 U/L (ref 12–78)
ANION GAP SERPL CALC-SCNC: 6 MMOL/L (ref 5–15)
APTT PPP: 27.7 SEC (ref 22.1–32)
AST SERPL-CCNC: 23 U/L (ref 15–37)
BASOPHILS # BLD: 0.1 K/UL (ref 0–0.1)
BASOPHILS NFR BLD: 1 % (ref 0–1)
BILIRUB SERPL-MCNC: 0.4 MG/DL (ref 0.2–1)
BUN SERPL-MCNC: 8 MG/DL (ref 6–20)
BUN/CREAT SERPL: 8 (ref 12–20)
CALCIUM SERPL-MCNC: 8.6 MG/DL (ref 8.5–10.1)
CHLORIDE SERPL-SCNC: 108 MMOL/L (ref 97–108)
CHOLEST SERPL-MCNC: 144 MG/DL
CK SERPL-CCNC: 113 U/L (ref 39–308)
CO2 SERPL-SCNC: 28 MMOL/L (ref 21–32)
COMMENT, HOLDF: NORMAL
CREAT SERPL-MCNC: 1.04 MG/DL (ref 0.7–1.3)
DIFFERENTIAL METHOD BLD: ABNORMAL
EOSINOPHIL # BLD: 0.2 K/UL (ref 0–0.4)
EOSINOPHIL NFR BLD: 3 % (ref 0–7)
ERYTHROCYTE [DISTWIDTH] IN BLOOD BY AUTOMATED COUNT: 12.6 % (ref 11.5–14.5)
EST. AVERAGE GLUCOSE BLD GHB EST-MCNC: 108 MG/DL
GLOBULIN SER CALC-MCNC: 3.5 G/DL (ref 2–4)
GLUCOSE BLD STRIP.AUTO-MCNC: 121 MG/DL (ref 65–100)
GLUCOSE BLD STRIP.AUTO-MCNC: 142 MG/DL (ref 65–100)
GLUCOSE SERPL-MCNC: 127 MG/DL (ref 65–100)
HBA1C MFR BLD: 5.4 % (ref 4.2–6.3)
HCT VFR BLD AUTO: 41.5 % (ref 36.6–50.3)
HDLC SERPL-MCNC: 27 MG/DL
HDLC SERPL: 5.3 {RATIO} (ref 0–5)
HGB BLD-MCNC: 13.7 G/DL (ref 12.1–17)
IMM GRANULOCYTES # BLD: 0 K/UL (ref 0–0.04)
IMM GRANULOCYTES NFR BLD AUTO: 1 % (ref 0–0.5)
INR PPP: 1 (ref 0.9–1.1)
LDLC SERPL CALC-MCNC: 55.4 MG/DL (ref 0–100)
LIPID PROFILE,FLP: ABNORMAL
LYMPHOCYTES # BLD: 1.7 K/UL (ref 0.8–3.5)
LYMPHOCYTES NFR BLD: 23 % (ref 12–49)
MAGNESIUM SERPL-MCNC: 2 MG/DL (ref 1.6–2.4)
MCH RBC QN AUTO: 31.1 PG (ref 26–34)
MCHC RBC AUTO-ENTMCNC: 33 G/DL (ref 30–36.5)
MCV RBC AUTO: 94.1 FL (ref 80–99)
MONOCYTES # BLD: 0.5 K/UL (ref 0–1)
MONOCYTES NFR BLD: 8 % (ref 5–13)
NEUTS SEG # BLD: 4.6 K/UL (ref 1.8–8)
NEUTS SEG NFR BLD: 65 % (ref 32–75)
NRBC # BLD: 0 K/UL (ref 0–0.01)
NRBC BLD-RTO: 0 PER 100 WBC
PLATELET # BLD AUTO: 158 K/UL (ref 150–400)
POTASSIUM SERPL-SCNC: 4.1 MMOL/L (ref 3.5–5.1)
PROT SERPL-MCNC: 7.2 G/DL (ref 6.4–8.2)
PROTHROMBIN TIME: 10.1 SEC (ref 9–11.1)
RBC # BLD AUTO: 4.41 M/UL (ref 4.1–5.7)
SAMPLES BEING HELD,HOLD: NORMAL
SERVICE CMNT-IMP: ABNORMAL
SERVICE CMNT-IMP: ABNORMAL
SODIUM SERPL-SCNC: 142 MMOL/L (ref 136–145)
THERAPEUTIC RANGE,PTTT: NORMAL SECS (ref 58–77)
TRIGL SERPL-MCNC: 308 MG/DL (ref ?–150)
TROPONIN I SERPL-MCNC: <0.05 NG/ML
TSH SERPL DL<=0.05 MIU/L-ACNC: 1.08 UIU/ML (ref 0.36–3.74)
VLDLC SERPL CALC-MCNC: 61.6 MG/DL
WBC # BLD AUTO: 7.1 K/UL (ref 4.1–11.1)

## 2018-09-29 PROCEDURE — 80053 COMPREHEN METABOLIC PANEL: CPT | Performed by: EMERGENCY MEDICINE

## 2018-09-29 PROCEDURE — 84443 ASSAY THYROID STIM HORMONE: CPT | Performed by: FAMILY MEDICINE

## 2018-09-29 PROCEDURE — 74011000258 HC RX REV CODE- 258: Performed by: EMERGENCY MEDICINE

## 2018-09-29 PROCEDURE — 36415 COLL VENOUS BLD VENIPUNCTURE: CPT | Performed by: EMERGENCY MEDICINE

## 2018-09-29 PROCEDURE — 82550 ASSAY OF CK (CPK): CPT | Performed by: PHYSICIAN ASSISTANT

## 2018-09-29 PROCEDURE — 82962 GLUCOSE BLOOD TEST: CPT

## 2018-09-29 PROCEDURE — 80061 LIPID PANEL: CPT | Performed by: FAMILY MEDICINE

## 2018-09-29 PROCEDURE — 99285 EMERGENCY DEPT VISIT HI MDM: CPT

## 2018-09-29 PROCEDURE — 83735 ASSAY OF MAGNESIUM: CPT | Performed by: PHYSICIAN ASSISTANT

## 2018-09-29 PROCEDURE — 99218 HC RM OBSERVATION: CPT

## 2018-09-29 PROCEDURE — 74011250637 HC RX REV CODE- 250/637: Performed by: FAMILY MEDICINE

## 2018-09-29 PROCEDURE — 85025 COMPLETE CBC W/AUTO DIFF WBC: CPT | Performed by: EMERGENCY MEDICINE

## 2018-09-29 PROCEDURE — 70498 CT ANGIOGRAPHY NECK: CPT

## 2018-09-29 PROCEDURE — 93005 ELECTROCARDIOGRAM TRACING: CPT

## 2018-09-29 PROCEDURE — 83036 HEMOGLOBIN GLYCOSYLATED A1C: CPT | Performed by: FAMILY MEDICINE

## 2018-09-29 PROCEDURE — 85610 PROTHROMBIN TIME: CPT | Performed by: EMERGENCY MEDICINE

## 2018-09-29 PROCEDURE — 74011636320 HC RX REV CODE- 636/320: Performed by: EMERGENCY MEDICINE

## 2018-09-29 PROCEDURE — 85730 THROMBOPLASTIN TIME PARTIAL: CPT | Performed by: EMERGENCY MEDICINE

## 2018-09-29 PROCEDURE — 84484 ASSAY OF TROPONIN QUANT: CPT | Performed by: PHYSICIAN ASSISTANT

## 2018-09-29 PROCEDURE — 70450 CT HEAD/BRAIN W/O DYE: CPT

## 2018-09-29 RX ORDER — SODIUM CHLORIDE 0.9 % (FLUSH) 0.9 %
10 SYRINGE (ML) INJECTION
Status: COMPLETED | OUTPATIENT
Start: 2018-09-29 | End: 2018-09-29

## 2018-09-29 RX ORDER — ENOXAPARIN SODIUM 100 MG/ML
40 INJECTION SUBCUTANEOUS EVERY 24 HOURS
Status: DISCONTINUED | OUTPATIENT
Start: 2018-09-30 | End: 2018-10-02 | Stop reason: HOSPADM

## 2018-09-29 RX ORDER — LABETALOL HYDROCHLORIDE 5 MG/ML
5 INJECTION, SOLUTION INTRAVENOUS
Status: DISCONTINUED | OUTPATIENT
Start: 2018-09-29 | End: 2018-10-02 | Stop reason: HOSPADM

## 2018-09-29 RX ORDER — GUAIFENESIN 100 MG/5ML
81 LIQUID (ML) ORAL DAILY
Status: DISCONTINUED | OUTPATIENT
Start: 2018-09-30 | End: 2018-10-02 | Stop reason: HOSPADM

## 2018-09-29 RX ORDER — ADHESIVE BANDAGE
30 BANDAGE TOPICAL DAILY PRN
Status: DISCONTINUED | OUTPATIENT
Start: 2018-09-29 | End: 2018-10-02 | Stop reason: HOSPADM

## 2018-09-29 RX ORDER — SODIUM CHLORIDE 0.9 % (FLUSH) 0.9 %
5-10 SYRINGE (ML) INJECTION EVERY 8 HOURS
Status: DISCONTINUED | OUTPATIENT
Start: 2018-09-29 | End: 2018-10-02 | Stop reason: HOSPADM

## 2018-09-29 RX ORDER — SODIUM CHLORIDE 0.9 % (FLUSH) 0.9 %
5-10 SYRINGE (ML) INJECTION AS NEEDED
Status: DISCONTINUED | OUTPATIENT
Start: 2018-09-29 | End: 2018-10-02 | Stop reason: HOSPADM

## 2018-09-29 RX ORDER — ATORVASTATIN CALCIUM 40 MG/1
40 TABLET, FILM COATED ORAL
Status: DISCONTINUED | OUTPATIENT
Start: 2018-09-29 | End: 2018-10-02 | Stop reason: HOSPADM

## 2018-09-29 RX ORDER — ACETAMINOPHEN 650 MG/1
650 SUPPOSITORY RECTAL
Status: DISCONTINUED | OUTPATIENT
Start: 2018-09-29 | End: 2018-10-02 | Stop reason: HOSPADM

## 2018-09-29 RX ORDER — LISINOPRIL 20 MG/1
20 TABLET ORAL DAILY
Status: DISCONTINUED | OUTPATIENT
Start: 2018-09-29 | End: 2018-10-01

## 2018-09-29 RX ORDER — ACETAMINOPHEN 325 MG/1
650 TABLET ORAL
Status: DISCONTINUED | OUTPATIENT
Start: 2018-09-29 | End: 2018-10-02 | Stop reason: HOSPADM

## 2018-09-29 RX ADMIN — IOPAMIDOL 100 ML: 755 INJECTION, SOLUTION INTRAVENOUS at 17:01

## 2018-09-29 RX ADMIN — LISINOPRIL 20 MG: 20 TABLET ORAL at 22:01

## 2018-09-29 RX ADMIN — Medication 10 ML: at 22:02

## 2018-09-29 RX ADMIN — Medication 10 ML: at 17:01

## 2018-09-29 RX ADMIN — ATORVASTATIN CALCIUM 40 MG: 40 TABLET, FILM COATED ORAL at 22:01

## 2018-09-29 RX ADMIN — SODIUM CHLORIDE 100 ML: 900 INJECTION, SOLUTION INTRAVENOUS at 17:01

## 2018-09-29 RX ADMIN — ACETAMINOPHEN 650 MG: 325 TABLET ORAL at 20:30

## 2018-09-29 NOTE — ED PROVIDER NOTES
HPI Comments: 41 y.o. male with no significant past medical history who presents from Home with chief complaint of Headache. Patient states onset yesterday of a headache. Pt states constant symptoms since onset. Pt states sudden onset \"today around 1450\" of dizziness \"when he got up and walked over to the bed when he had sudden onset of weakness in his right leg. Pt reports slight gradual improvement since onset. Pt states constant frontal and posterior headache. Pt denies any aggravating or relieving symptoms. Pt denies previous history of symptoms similar to those presented today. Pt denies fever, chills, cough, congestion, shortness of breath, chest pain, abdominal pain, nausea, vomiting, diarrhea, difficulty with urination or dysuria. There are no other acute medical concerns at this time. PCP: Baylee Kumar MD 
 
Note written by Rishi Lincoln, as dictated by Dulce Maria Navarro MD 4:10 PM 
 
The history is provided by the patient. Past Medical History:  
Diagnosis Date  Hepatitis B immune April 2014  
 sAb (+) after 2 doses vaccine. cAb and sAg negative. [facility] testing.  Immunity status testing April 2014 MMR and VZV immune. 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 IGRA (+) April 2014. CXR negative. Pt did not start therapy until Jan 2015. Pt report CXR repeated prior to 9mo INH started. INH delivered at [facility].  Visual problems--seen by ophth prior to June 2014 appt. 6/16/2014 Notes referred to eye specialist--glasses per pt report not able to help. History reviewed. No pertinent surgical history. History reviewed. No pertinent family history. Social History Social History  Marital status:  Spouse name: N/A  
 Number of children: N/A  
 Years of education: N/A Occupational History  Not on file. Social History Main Topics  Smoking status: Current Some Day Smoker Types: Cigarettes  Smokeless tobacco: Never Used  Alcohol use No  
 Drug use: No  
 Sexual activity: Not Currently Other Topics Concern  Not on file Social History Narrative ALLERGIES: Review of patient's allergies indicates no known allergies. Review of Systems Constitutional: Negative for chills and fever. HENT: Negative for congestion. Respiratory: Negative for cough and shortness of breath. Cardiovascular: Negative for chest pain. Gastrointestinal: Negative for abdominal pain, diarrhea, nausea and vomiting. Genitourinary: Negative for difficulty urinating and dysuria. Neurological: Positive for dizziness, weakness and headaches. All other systems reviewed and are negative. Vitals:  
 09/29/18 1601 BP: (!) 181/104 Pulse: 76 Resp: 18 Temp: 98.3 °F (36.8 °C) SpO2: 99% Weight: 81.6 kg (180 lb) Height: 5' 3\" (1.6 m) Physical Exam  
Constitutional: He is oriented to person, place, and time. He appears well-developed and well-nourished. No distress. HENT:  
Head: Normocephalic and atraumatic. Eyes: Conjunctivae are normal.  
Neck: Neck supple. No tracheal deviation present. Cardiovascular: Normal rate and regular rhythm. Pulmonary/Chest: Effort normal. No respiratory distress. Abdominal: He exhibits no distension. Musculoskeletal: Normal range of motion. Neurological: He is alert and oriented to person, place, and time. No pronator drift.  strength equal bilaterally. Skin: Skin is warm and dry. Psychiatric: He has a normal mood and affect. Nursing note and vitals reviewed. Note written by Rishi Pastor, as dictated by Nicole Barker MD 4:17 PM 
 
MDM Number of Diagnoses or Management Options Nonintractable headache, unspecified chronicity pattern, unspecified headache type:  
Secondary hypertension: Transient cerebral ischemia, unspecified type:  
Diagnosis management comments: Spoke to tele neurology who advised admitting to hospital for TIA workup. Reviewed treatment plan with attending and they agree. Nita Hawley 
 
 
 
ED Course Procedures

## 2018-09-29 NOTE — IP AVS SNAPSHOT
[address] 
[phone] Patient: Garfield Helton MRN: [id number] About your hospitalization You were admitted on:  September 29, 2018 You last received care in the:  [facility] 6S NEURO-SCI TELE You were discharged on:  October 2, 2018 Why you were hospitalized Your primary diagnosis was:  Hypertension Your diagnoses also included:  Vertebrobasilar Artery Syndrome, Tia (Transient Ischemic Attack), Acute Cva (Cerebrovascular Accident) (Hcc) Follow-up Information Follow up With Details Comments Contact Info Sonia Mijares MD In 1 week post hospitalization follow up within one week recommended. [address] 
[phone] [facility] On 10/15/2018 New PCP appointment on Monday 10/15 @ 1:15 p.m. Please bring discharge instructions, proof of income, Photo ID, and all medications. [facility] [address] [facility] [address] 
[phone] 2 [facility] Call on 10/4/2018 Home Health Physical Therapy. Call agency if you have not heard from them by 12:00 p.m. [address] 
1st Floor [facility] [address] 
[phone] Discharge Orders None A check maliha indicates which time of day the medication should be taken. My Medications START taking these medications Instructions Each Dose to Equal  
 Morning Noon Evening Bedtime  
 amLODIPine 10 mg tablet Commonly known as:  Michaelene Miller Start taking on:  10/3/2018 Your last dose was:  10/2/18 08:50 am  
Your next dose is:  10/03/18 9am  
   
 Take 1 Tab by mouth daily. 10 mg  
    
  
   
   
   
  
 aspirin 81 mg chewable tablet Your last dose was:  10/02/18 08:50 am  
Your next dose is:  10/03/18 9am  
   
 Take 1 Tab by mouth daily for 30 days. 81 mg  
    
  
   
   
   
  
 atorvastatin 40 mg tablet Commonly known as:  LIPITOR Your last dose was:  10/01/18 09:00 pm  
Your next dose is:  10/02/18 9pm  
   
 Take 1 Tab by mouth nightly for 30 days. 40 mg  
    
   
   
   
  
  
 hydroCHLOROthiazide 25 mg tablet Commonly known as:  HYDRODIURIL Start taking on:  10/3/2018 Your last dose was:  10/02/18 08:50 am  
Your next dose is:  10/03/18 9am  
   
 Take 1 Tab by mouth daily. 25 mg  
    
  
   
   
   
  
  
STOP taking these medications   
 lisinopril 10 mg tablet Commonly known as:  Katrina Closs Where to Get Your Medications Information on where to get these meds will be given to you by the nurse or doctor. ! Ask your nurse or doctor about these medications  
  amLODIPine 10 mg tablet  
 aspirin 81 mg chewable tablet  
 atorvastatin 40 mg tablet  
 hydroCHLOROthiazide 25 mg tablet Discharge Instructions   
  
   
Stroke: Care Instructions Your Care Instructions You have had a stroke. This means that the blood flow to a part of your brain was blocked for some time, which damages the nerve cells in that part of the brain. The part of your body controlled by that part of your brain may not function properly now. The brain is an amazing organ that can heal itself to some degree. The stroke you had damaged part of your brain. But other parts of your brain may take over in some way for the damaged areas. You have already started this process. Your doctor will talk with you about what you can do to prevent another stroke. High blood pressure, high cholesterol, and diabetes are all risk factors for stroke. If you have any of these conditions, work with your doctor to make sure they are under control. Other risk factors for stroke include being overweight, smoking, and not getting regular exercise. Going home may be hard for you and your family. The more you can try to do for yourself, the better. Remember to take each day one at a time. Follow-up care is a key part of your treatment and safety. Be sure to make and go to all appointments, and call your doctor if you are having problems. It's also a good idea to know your test results and keep a list of the medicines you take. How can you care for yourself at home? 
   · Enter a stroke rehabilitation (rehab) program, if your doctor recommends it. Physical, speech, and occupational therapies can help you manage bathing, dressing, eating, and other basics of daily living.  
   · Do not drive until your doctor says it is okay.  
   · It is normal to feel sad or depressed after a stroke. If these feelings last, talk to your doctor.  
[place] · If you are having problems with urine leakage, go to the bathroom at regular times, including when you first wake up and at bedtime. Also, limit fluids after dinner.  
   · If you are constipated, drink plenty of fluids, enough so that your urine is light yellow or clear like water. If you have kidney, heart, or liver disease and have to limit fluids, talk with your doctor before you increase the amount of fluids you drink. Set up a regular time for using the toilet. If you continue to have constipation, your doctor may suggest using a bulking agent, such as Metamucil, or a stool softener, laxative, or enema. Medicines 
   · Take your medicines exactly as prescribed. Call your doctor if you think you are having a problem with your medicine. You may be taking several medicines. ACE (angiotensin-converting enzyme) inhibitors, angiotensin II receptor blockers (ARBs), beta-blockers, diuretics (water pills), and calcium channel blockers control your blood pressure. Statins help lower cholesterol.  Your doctor may also prescribe medicines for depression, pain, sleep problems, anxiety, or agitation.  
   · If your doctor has given you a blood thinner to prevent another stroke, be sure you get instructions about how to take your medicine safely. Blood thinners can cause serious bleeding problems.  
   · Do not take any over-the-counter medicines or herbal products without talking to your doctor first.  
   · If you take birth control pills or hormone therapy, talk to your doctor about whether they are right for you.  
 For family members and caregivers 
   · Make the home safe. Set up a room so that your loved one does not have to climb stairs. Be sure the bathroom is on the same floor. Move throw rugs and furniture that could cause falls. Make sure that the lighting is good. Put grab bars and seats in tubs and showers.  
   · Find out what your loved one can do and what he or she needs help with. Try not to do things for your loved one that your loved one can do on his or her own. Help him or her learn and practice new skills.  
   · Visit and talk with your loved one often. Try doing activities together that you both enjoy, such as playing cards or board games. Keep in touch with your loved one's friends as much as you can. Encourage them to visit.  
   · Take care of yourself. Do not try to do everything yourself. Ask other family members to help. Eat well, get enough rest, and take time to do things that you enjoy. Keep up with your own doctor visits, and make sure to take your medicines regularly. Get out of the house as much as you can. Join a local support group. Find out if you qualify for home health care visits to help with rehab or for adult day care. When should you call for help? Call 911 anytime you think you may need emergency care. For example, call if: 
   · You have signs of another stroke. These may include: 
¨ Sudden numbness, tingling, weakness, or loss of movement in your face, arm, or leg, especially on only one side of your body. ¨ Sudden vision changes. ¨ Sudden trouble speaking. ¨ Sudden confusion or trouble understanding simple statements. ¨ Sudden problems with walking or balance. ¨ A sudden, severe headache that is different from past headaches. Call 911 even if these symptoms go away in a few minutes.  
 Call your doctor now or seek immediate medical care if: 
   · You have new symptoms that may be related to your stroke, such as falls or trouble swallowing.  
 Watch closely for changes in your health, and be sure to contact your doctor if you have any problems. Where can you learn more? Go to http://joby-carter.info/. Enter T346 in the search box to learn more about \"Stroke: Care Instructions. \" Current as of: November 21, 2017 Content Version: 11.7 © 2364-1952 [employer]. Care instructions adapted under license by [employer] (which disclaims liability or warranty for this information). If you have questions about a medical condition or this instruction, always ask your healthcare professional. [address] any warranty or liability for your use of this information. Discharge Instructions PATIENT ID: Bob Reed MRN: [id number] YOB: 1980 DATE OF ADMISSION: 9/29/2018  3:49 PM   
DATE OF DISCHARGE: 10/1/2018 PRIMARY CARE PROVIDER: Cathleen Haywood MD  
 
ATTENDING PHYSICIAN: El Munoz MD 
DISCHARGING PROVIDER: El Munoz MD   
To contact this individual call [phone] and ask the  to page. If unavailable ask to be transferred the Adult Hospitalist Department. DISCHARGE DIAGNOSES and ADDITIONAL CARE RECOMMENDATIONS:  
Acute stroke 
-You are started on aspirin 81 mg to be taken daily and cholesterol mediation called Lipitor nightly. Follow with your primary doctor. You need blood work in 6 weeks to check for liver enzymes. the two major side effects of the  Lipitor involve the muscle, joints and liver. If you experience severe muscle pain, weakness,stop the Lipitor and talk with your primary doctor. Uncontrolled hypertension:You are currently on amlodipine and hydrochlorothiazide for hypertension, we strongly advise you take it regularly. Lisinopril was stopped as you had swelling of lips. DIET: Cardiac Diet ACTIVITY: Activity as tolerated WOUND CARE: NA 
 
EQUIPMENT needed: NA 
 
CONSULTATIONS: IP CONSULT TO HOSPITALIST 
IP CONSULT TO NEUROLOGY 
IP CONSULT TO NEUROLOGY PROCEDURES/SURGERIES: * No surgery found * PENDING TEST RESULTS:  
At the time of discharge the following test results are still pending:echocardiogram. 
 
FOLLOW UP APPOINTMENTS:  
Follow-up Information Follow up With Details Comments Contact Info Gabino Rosario MD In 1 week post hospitalization follow up within one week recommended. [address] 
[phone] DISCHARGE MEDICATIONS: 
 See Medication Reconciliation Form · It is important that you take the medication exactly as they are prescribed. · Keep your medication in the bottles provided by the pharmacist and keep a list of the medication names, dosages, and times to be taken in your wallet. · Do not take other medications without consulting your doctor. NOTIFY YOUR PHYSICIAN FOR ANY OF THE FOLLOWING:  
Fever over 101 degrees for 24 hours. Chest pain, shortness of breath, fever, chills, nausea, vomiting, diarrhea, change in mentation, falling, weakness, bleeding. Severe pain or pain not relieved by medications. Or, any other signs or symptoms that you may have questions about. DISPOSITION: 
x  Home With: 
 OT  PT  [facility]  RN  
  
 SNF/Inpatient Rehab/LTAC Independent/assisted living Hospice Other:  
 
 
 
 
Signed: Montez Davis MD 
10/1/2018 11:25 AM 
  
 
 
  
  
  
Introducing [facility]! [employer] introduces [employer] patient portal. Now you can access parts of your medical record, email your doctor's office, and request medication refills online. 1. In your internet browser, go to [email or web address] 2. Click on the First Time User? Click Here link in the Sign In box. You will see the New Member Sign Up page. 3. Enter your Comfy Access Code exactly as it appears below. You will not need to use this code after youve completed the sign-up process. If you do not sign up before the expiration date, you must request a new code. · Comfy Access Code: [email or web address] Expires: 12/28/2018  3:56 PM 
 
4. Enter the last four digits of your Social Security Number (xxxx) and Date of Birth (mm/dd/yyyy) as indicated and click Submit. You will be taken to the next sign-up page. 5. Create a RF-iT Solutionst ID. This will be your Comfy login ID and cannot be changed, so think of one that is secure and easy to remember. 6. Create a Comfy password. You can change your password at any time. 7. Enter your Password Reset Question and Answer. This can be used at a later time if you forget your password. 8. Enter your e-mail address. You will receive e-mail notification when new information is available in [address] 9. Click Sign Up. You can now view and download portions of your medical record. 10. Click the Download Summary menu link to download a portable copy of your medical information. If you have questions, please visit the Frequently Asked Questions section of the Comfy website. Remember, Comfy is NOT to be used for urgent needs. For medical emergencies, dial 911. Now available from your iPhone and Android! Introducing Fritz Wheat As a [facility] patient, I wanted to make you aware of our electronic visit tool called Fritz Wheat. [facility] 24/7 allows you to connect within minutes with a medical provider 24 hours a day, seven days a week via a mobile device or tablet or logging into a secure website from your computer. You can access Fritz Wheat from anywhere in the [place]. A virtual visit might be right for you when you have a simple condition and feel like you just dont want to get out of bed, or cant get away from work for an appointment, when your regular [facility] provider is not available (evenings, weekends or holidays), or when youre out of town and need minor care. Electronic visits cost only $49 and if the [employer] provider determines a prescription is needed to treat your condition, one can be electronically transmitted to a nearby pharmacy*. Please take a moment to enroll today if you have not already done so. The enrollment process is free and takes just a few minutes. To enroll, please download the EduSourced brad to your tablet or phone, or visit [email or web address] to enroll on your computer. And, as an [address] [place] [address] patient with a [employer] account, the results of your visits will be scanned into your electronic medical record and your primary care provider will be able to view the scanned results. We urge you to continue to see your regular [facility] provider for your ongoing medical care. And while your primary care provider may not be the one available when you seek a Fritz Jonathonzenaida virtual visit, the peace of mind you get from getting a real diagnosis real time can be priceless. For more information on Fritz Wheat, view our Frequently Asked Questions (FAQs) at [email or web address]. Sincerely, 
 
Twanna Lefort, MD 
Chief Medical Officer [facility] Claudia Gaspar *:  certain medications cannot be prescribed via [employer] Providers Seen During Your Hospitalization Provider Specialty Primary office phone Pastora Salazar MD Emergency Medicine [phone] Benjamin Camacho MD [facility] [phone] Ollie Colón MD Internal Medicine [phone] Radames Abel MD Hospitalist [phone] Immunizations Administered for This Admission Name Date Influenza Vaccine (Quad) PF 10/2/2018 Your Primary Care Physician (PCP) Primary Care Physician Office Phone Office Fax Erica Rosario [phone] You are allergic to the following Allergen Reactions Lisinopril Angioedema Lips had tingling/numbness Recent Documentation Height Weight BMI Smoking Status 1.6 m 76.3 kg 29.8 kg/m2 Current Some Day Smoker Emergency Contacts Name Discharge Info Relation Home Work Mobile Alfonzo Groves DISCHARGE CAREGIVER [3] Brother [24] [phone] Patient Belongings The following personal items are in your possession at time of discharge: 
  Dental Appliances: None  Visual Aid: At home, Glasses      Home Medications: None      Clothing: At bedside Please provide this summary of care documentation to your next provider. Signatures-by signing, you are acknowledging that this After Visit Summary has been reviewed with you and you have received a copy. Patient Signature:  ____________________________________________________________ Date:  ____________________________________________________________  
  
[facility] Provider Signature:  ____________________________________________________________ Date:  ____________________________________________________________

## 2018-09-29 NOTE — ED TRIAGE NOTES
Triage:  Pt to the ED via EMS due to continued unrelieved HA, and right side weakness causing him to lose balance and fall. Pt states weakness began around 1450 today and since onset has gradually improved. On arrival, Pt A/O x4, with cont complaint HA and noted slight weakness to right hand grasp when compared to left.   Lower extremities equal.

## 2018-09-29 NOTE — IP AVS SNAPSHOT
[address] 
[phone] Patient: Natasha Hardin MRN: [id number]:[id number] A check maliha indicates which time of day the medication should be taken. My Medications START taking these medications Instructions Each Dose to Equal  
 Morning Noon Evening Bedtime  
 amLODIPine 10 mg tablet Commonly known as:  Chavez Decant Start taking on:  10/3/2018 Your last dose was:  10/2/18 08:50 am  
Your next dose is:  10/03/18 9am  
   
 Take 1 Tab by mouth daily. 10 mg  
    
  
   
   
   
  
 aspirin 81 mg chewable tablet Your last dose was:  10/02/18 08:50 am  
Your next dose is:  10/03/18 9am  
   
 Take 1 Tab by mouth daily for 30 days. 81 mg  
    
  
   
   
   
  
 atorvastatin 40 mg tablet Commonly known as:  LIPITOR Your last dose was:  10/01/18 09:00 pm  
Your next dose is:  10/02/18 9pm  
   
 Take 1 Tab by mouth nightly for 30 days. 40 mg  
    
   
   
   
  
  
 hydroCHLOROthiazide 25 mg tablet Commonly known as:  HYDRODIURIL Start taking on:  10/3/2018 Your last dose was:  10/02/18 08:50 am  
Your next dose is:  10/03/18 9am  
   
 Take 1 Tab by mouth daily. 25 mg  
    
  
   
   
   
  
  
STOP taking these medications   
 lisinopril 10 mg tablet Commonly known as:  Nat Jackson Where to Get Your Medications Information on where to get these meds will be given to you by the nurse or doctor. ! Ask your nurse or doctor about these medications  
  amLODIPine 10 mg tablet  
 aspirin 81 mg chewable tablet  
 atorvastatin 40 mg tablet  
 hydroCHLOROthiazide 25 mg tablet

## 2018-09-29 NOTE — ED NOTES
1630: PA notified of patients high /125. No new orders received. 1700: PA notified of patients continuing high /113. No new orders received.

## 2018-09-29 NOTE — H&P
Nivia Frye MD 
Please call  and page for questions. Call physician on-call through the  7pm-7am 
 
 
History & Physical 
 
Primary Care Provider: Sebastian Pruitt MD 
Source of Information: Patient and medical record. History of Presenting Illness:  
Sea Leach is a 41 y.o. male with past medical history of hypertension who was brought to the ED by EMS with headache and right sided weakness today. Patient has headache off and on at the neck and above the neck. The head ache got worse yesterday. He tool over OTC medicines. He went back to work today. Head ache got worse over the day. Later part of the day at aroung 1420 today, patient started having weakness and tingling of his right sided extremities and at some point he could not move them at all with dizziness. This weakness lasted around 1 hour. Patient also felt palpitation at some point of the severe headache. He felt he will loss concoiusness at the middle of the episode of the dizziness. The hotel supervisor called the EMS. By the time he was at the EMS, patient symptoms gradually resolved. When I saw him today, he did not have any issue. The patient denies any fever, chills, chest pain, cough, congestion, recent illness,  or dysuria. Pt denies any aggravating or relieving symptoms. Pt denies previous history of symptoms similar to those presented today. Pt denies abdominal pain, nausea, vomiting, diarrhea. Review of Systems: A comprehensive review of systems was negative except for that written in the History of Present Illness. Past Medical History:  
Diagnosis Date  Hepatitis B immune April 2014  
 sAb (+) after 2 doses vaccine. cAb and sAg negative. [facility] testing.  Immunity status testing April 2014 MMR and VZV immune. 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 IGRA (+) April 2014. CXR negative. Pt did not start therapy until Jan 2015. Pt report CXR repeated prior to 9mo INH started. [facility].  Visual problems--seen by ophth prior to June 2014 appt. 6/16/2014 Notes referred to eye specialist--glasses per pt report not able to help. History reviewed. No pertinent surgical history. Prior to Admission medications Medication Sig Start Date End Date Taking? Authorizing Provider  
lisinopril (PRINIVIL, ZESTRIL) 10 mg tablet Take 1 Tab by mouth daily. 3/17/17   Kiara Jean MD  
 
No Known Allergies History reviewed. No pertinent family history. SOCIAL HISTORY: 
Patient resides: 
[facility]ependently [facility] Smoking history:  
None 1 to 5 cigs daily with frienss. Former Chronic Alcohol history:  
None x Social   
Chronic Ambulates:  
Independently x  
w/cane   
w/walker   
w/wc CODE STATUS: 
DNR Full x Other Objective:  
 
Physical Exam:  
 
Visit Vitals  /83  Pulse 70  Temp 98.3 °F (36.8 °C)  Resp 18  Ht 5' 3\" (1.6 m)  Wt 81.6 kg (180 lb)  SpO2 99%  BMI 31.89 kg/m2 O2 Device: Room air General:  Alert, cooperative, no distress, appears stated age. Head:  Normocephalic, without obvious abnormality, atraumatic. Eyes:  Conjunctivae/corneas clear. PERRL, EOMs intact. Nose: Nares normal.   
Neck: Supple, symmetrical, trachea midline, no adenopathy. Back:   Symmetric, no curvature. ROM normal. No CVA tenderness. Lungs:   Clear to auscultation bilaterally. Chest wall:  No tenderness or deformity. Heart:  Regular rate and rhythm, S1, S2 normal, no murmur. Abdomen:   Soft, non-tender. Bowel sounds normal.   
Extremities: Extremities normal, atraumatic, no cyanosis or edema. Pulses: 2+ and symmetric all extremities. Skin: Skin color, texture, turgor normal. No rashes or lesions Neurologic: CNII-XII intact. EKG:  normal EKG, normal sinus rhythm. Data Review:  
 
Recent Days: 
Recent Labs  
   09/29/18 
 1604 WBC  7.1 HGB  13.7 HCT  41.5 PLT  158 Recent Labs  
   09/29/18 
 1604 NA  142  
K  4.1 CL  108 CO2  28 GLU  127* BUN  8  
CREA  1.04  
CA  8.6 MG  2.0 ALB  3.7 SGOT  23 ALT  37 INR  1.0 No results for input(s): PH, PCO2, PO2, HCO3, FIO2 in the last 72 hours. 24 Hour Results: 
Recent Results (from the past 24 hour(s)) GLUCOSE, POC Collection Time: 09/29/18  4:00 PM  
Result Value Ref Range Glucose (POC) 142 (H) 65 - 100 mg/dL Performed by Chris Brock   
EKG, 12 LEAD, INITIAL Collection Time: 09/29/18  4:00 PM  
Result Value Ref Range Ventricular Rate 73 BPM  
 Atrial Rate 73 BPM  
 P-R Interval 144 ms QRS Duration 84 ms Q-T Interval 362 ms QTC Calculation (Bezet) 398 ms Calculated P Axis 59 degrees Calculated R Axis 60 degrees Calculated T Axis 158 degrees Diagnosis Normal sinus rhythm ST & T wave abnormality, consider lateral ischemia No previous ECGs available METABOLIC PANEL, COMPREHENSIVE Collection Time: 09/29/18  4:04 PM  
Result Value Ref Range Sodium 142 136 - 145 mmol/L Potassium 4.1 3.5 - 5.1 mmol/L Chloride 108 97 - 108 mmol/L  
 CO2 28 21 - 32 mmol/L Anion gap 6 5 - 15 mmol/L Glucose 127 (H) 65 - 100 mg/dL BUN 8 6 - 20 MG/DL Creatinine 1.04 0.70 - 1.30 MG/DL  
 BUN/Creatinine ratio 8 (L) 12 - 20 GFR est AA >60 >60 ml/min/1.73m2 GFR est non-AA >60 >60 ml/min/1.73m2 Calcium 8.6 8.5 - 10.1 MG/DL Bilirubin, total 0.4 0.2 - 1.0 MG/DL  
 ALT (SGPT) 37 12 - 78 U/L  
 AST (SGOT) 23 15 - 37 U/L Alk. phosphatase 69 45 - 117 U/L Protein, total 7.2 6.4 - 8.2 g/dL Albumin 3.7 3.5 - 5.0 g/dL Globulin 3.5 2.0 - 4.0 g/dL A-G Ratio 1.1 1.1 - 2.2    
CBC WITH AUTOMATED DIFF  Collection Time: 09/29/18  4:04 PM  
 Result Value Ref Range WBC 7.1 4.1 - 11.1 K/uL  
 RBC 4.41 4.10 - 5.70 M/uL  
 HGB 13.7 12.1 - 17.0 g/dL HCT 41.5 36.6 - 50.3 % MCV 94.1 80.0 - 99.0 FL  
 MCH 31.1 26.0 - 34.0 PG  
 MCHC 33.0 30.0 - 36.5 g/dL  
 RDW 12.6 11.5 - 14.5 % PLATELET 500 501 - 353 K/uL NRBC 0.0 0  WBC ABSOLUTE NRBC 0.00 0.00 - 0.01 K/uL NEUTROPHILS 65 32 - 75 % LYMPHOCYTES 23 12 - 49 % MONOCYTES 8 5 - 13 % EOSINOPHILS 3 0 - 7 % BASOPHILS 1 0 - 1 % IMMATURE GRANULOCYTES 1 (H) 0.0 - 0.5 % ABS. NEUTROPHILS 4.6 1.8 - 8.0 K/UL  
 ABS. LYMPHOCYTES 1.7 0.8 - 3.5 K/UL  
 ABS. MONOCYTES 0.5 0.0 - 1.0 K/UL  
 ABS. EOSINOPHILS 0.2 0.0 - 0.4 K/UL  
 ABS. BASOPHILS 0.1 0.0 - 0.1 K/UL  
 ABS. IMM. GRANS. 0.0 0.00 - 0.04 K/UL  
 DF AUTOMATED PROTHROMBIN TIME + INR Collection Time: 09/29/18  4:04 PM  
Result Value Ref Range INR 1.0 0.9 - 1.1 Prothrombin time 10.1 9.0 - 11.1 sec PTT Collection Time: 09/29/18  4:04 PM  
Result Value Ref Range aPTT 27.7 22.1 - 32.0 sec  
 aPTT, therapeutic range     58.0 - 77.0 SECS  
SAMPLES BEING HELD Collection Time: 09/29/18  4:04 PM  
Result Value Ref Range SAMPLES BEING HELD 1red COMMENT Add-on orders for these samples will be processed based on acceptable specimen integrity and analyte stability, which may vary by analyte. TROPONIN I Collection Time: 09/29/18  4:04 PM  
Result Value Ref Range Troponin-I, Qt. <0.05 <0.05 ng/mL CK W/ REFLX CKMB Collection Time: 09/29/18  4:04 PM  
Result Value Ref Range  39 - 308 U/L  
MAGNESIUM Collection Time: 09/29/18  4:04 PM  
Result Value Ref Range Magnesium 2.0 1.6 - 2.4 mg/dL Imaging:  
 
Assessment and Plan:  
 
 
Weakness right with dizziness:  
Has been resolved now. Probably hypertension related. Will observe overnight. Will get TTE with bubble, MRI, TSH, lipid panel, PT, OT, ST. Cardiac and neuro monitoring. Neurology consult. Hypertension: Patient was diagnosed of hypertension for about 3 to 4 years but taking medicines. Will start with lisinopril as he is supposed to take. labetalol as PRN. See orders for other plans: VTE prophylaxis: Heparin Code status: Full Discussed plan of care with Patient/Family and Nurse Pre-admission lived at home:  
Discharge planning: pending. Probably home tomorrow. Signed By: Kavin Toure MD   
 September 29, 2018

## 2018-09-29 NOTE — ED NOTES
1945: Report attempted x1 to [facility].  
 
2000: TRANSFER - OUT REPORT: 
 
Verbal report given to MAYRA Gannon (name) on Maryann Burnette  being transferred to [facility](unit) for routine progression of care Report consisted of patients Situation, Background, Assessment and  
Recommendations(SBAR). Information from the following report(s) SBAR, ED Summary, Intake/Output, MAR, Recent Results and Cardiac Rhythm SR' was reviewed with the receiving nurse. Lines:  
Peripheral IV 09/29/18 Right Antecubital (Active) Site Assessment Clean, dry, & intact 9/29/2018  4:28 PM  
Phlebitis Assessment 0 9/29/2018  4:28 PM  
Infiltration Assessment 0 9/29/2018  4:28 PM  
Dressing Status Clean, dry, & intact 9/29/2018  4:28 PM  
Dressing Type Transparent 9/29/2018  4:28 PM  
Hub Color/Line Status Pink;Flushed 9/29/2018  4:28 PM  
Action Taken Blood drawn 9/29/2018  4:28 PM  
Alcohol Cap Used No 9/29/2018  4:28 PM  
  
 
Opportunity for questions and clarification was provided. Patient transported with: 
 Monitor

## 2018-09-30 ENCOUNTER — APPOINTMENT (OUTPATIENT)
Dept: MRI IMAGING | Age: 38
DRG: 065 | End: 2018-09-30
Attending: FAMILY MEDICINE
Payer: SUBSIDIZED

## 2018-09-30 PROBLEM — I63.9 ACUTE CVA (CEREBROVASCULAR ACCIDENT) (HCC): Status: ACTIVE | Noted: 2018-09-30

## 2018-09-30 LAB
APPEARANCE UR: CLEAR
ATRIAL RATE: 73 BPM
BACTERIA URNS QL MICRO: NEGATIVE /HPF
BILIRUB UR QL: NEGATIVE
CALCULATED P AXIS, ECG09: 59 DEGREES
CALCULATED R AXIS, ECG10: 60 DEGREES
CALCULATED T AXIS, ECG11: 158 DEGREES
COLOR UR: ABNORMAL
DIAGNOSIS, 93000: NORMAL
EPITH CASTS URNS QL MICRO: ABNORMAL /LPF
GLUCOSE UR STRIP.AUTO-MCNC: NEGATIVE MG/DL
HGB UR QL STRIP: ABNORMAL
HYALINE CASTS URNS QL MICRO: ABNORMAL /LPF (ref 0–5)
KETONES UR QL STRIP.AUTO: NEGATIVE MG/DL
LEUKOCYTE ESTERASE UR QL STRIP.AUTO: NEGATIVE
NITRITE UR QL STRIP.AUTO: NEGATIVE
P-R INTERVAL, ECG05: 144 MS
PH UR STRIP: 7 [PH] (ref 5–8)
PROT UR STRIP-MCNC: NEGATIVE MG/DL
Q-T INTERVAL, ECG07: 362 MS
QRS DURATION, ECG06: 84 MS
QTC CALCULATION (BEZET), ECG08: 398 MS
RBC #/AREA URNS HPF: ABNORMAL /HPF (ref 0–5)
SP GR UR REFRACTOMETRY: 1.02 (ref 1–1.03)
UROBILINOGEN UR QL STRIP.AUTO: 0.2 EU/DL (ref 0.2–1)
VENTRICULAR RATE, ECG03: 73 BPM
WBC URNS QL MICRO: ABNORMAL /HPF (ref 0–4)

## 2018-09-30 PROCEDURE — 70551 MRI BRAIN STEM W/O DYE: CPT

## 2018-09-30 PROCEDURE — 74011250636 HC RX REV CODE- 250/636: Performed by: FAMILY MEDICINE

## 2018-09-30 PROCEDURE — 74011250637 HC RX REV CODE- 250/637: Performed by: FAMILY MEDICINE

## 2018-09-30 PROCEDURE — 97165 OT EVAL LOW COMPLEX 30 MIN: CPT

## 2018-09-30 PROCEDURE — 65660000000 HC RM CCU STEPDOWN

## 2018-09-30 PROCEDURE — G8988 SELF CARE GOAL STATUS: HCPCS

## 2018-09-30 PROCEDURE — G8987 SELF CARE CURRENT STATUS: HCPCS

## 2018-09-30 PROCEDURE — 99218 HC RM OBSERVATION: CPT

## 2018-09-30 PROCEDURE — 81001 URINALYSIS AUTO W/SCOPE: CPT | Performed by: FAMILY MEDICINE

## 2018-09-30 RX ADMIN — ASPIRIN 81 MG CHEWABLE TABLET 81 MG: 81 TABLET CHEWABLE at 09:26

## 2018-09-30 RX ADMIN — Medication 10 ML: at 22:00

## 2018-09-30 RX ADMIN — Medication 10 ML: at 05:30

## 2018-09-30 RX ADMIN — ATORVASTATIN CALCIUM 40 MG: 40 TABLET, FILM COATED ORAL at 21:14

## 2018-09-30 RX ADMIN — ENOXAPARIN SODIUM 40 MG: 40 INJECTION, SOLUTION INTRAVENOUS; SUBCUTANEOUS at 09:26

## 2018-09-30 NOTE — PROGRESS NOTES
Admission Medication Reconciliation: 
 
Information obtained from:  Patient Comments/Recommendations: Updated PTA meds/reviewed patient's allergies. Patient had a prescription for lisinopril that he never filled. Patient denies taking OTC or other prescription medications Allergies:  Review of patient's allergies indicates no known allergies. Significant PMH/Disease States:  
Past Medical History:  
Diagnosis Date  Hepatitis B immune April 2014  
 sAb (+) after 2 doses vaccine. cAb and sAg negative. [facility] testing.  Immunity status testing April 2014 MMR and VZV immune. 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 IGRA (+) April 2014. CXR negative. Pt did not start therapy until Jan 2015. Pt report CXR repeated prior to 9mo INH started. [facility].  Visual problems--seen by ophth prior to June 2014 appt. 6/16/2014 Notes referred to eye specialist--glasses per pt report not able to help. Chief Complaint for this Admission: Chief Complaint Patient presents with  
 Headache  Extremity Weakness Right Side Prior to Admission Medications:  
Prior to Admission Medications Prescriptions Last Dose Informant Patient Reported? Taking?  
lisinopril (PRINIVIL, ZESTRIL) 10 mg tablet Not Taking at Unknown time  No No  
Sig: Take 1 Tab by mouth daily. Facility-Administered Medications: None

## 2018-09-30 NOTE — PROGRESS NOTES
PT attempted to see pt for eval.  Pt in bed with family members present. In sitting, his initial BP on L UE was 185-89. The second time it was taken, it decreased to 157/104. Pt was complaining of dizziness and HA 4/10. His dizziness increased upon standing and BP was 157/94. Pt's RN, Cole Reynaga was informed of this and that pt was NOT seen for PT eval.  Pt to have MRI today. Will attempt PT eval at later time.

## 2018-09-30 NOTE — PROGRESS NOTES
Problem: Self Care Deficits Care Plan (Adult) Goal: *Acute Goals and Plan of Care (Insert Text) Occupational Therapy Goals Initiated 9/30/2018 1. Patient will perform all standing ADLs with no LOB and IND within 7 days. 2.  Patient will gather ADL items from high/low with no LOB and IND within 7 days. 3.  Patient will perform bathroom mobility with IND within 7 days. Occupational Therapy EVALUATION Patient: Malinda Lincoln (42 y.o. male) Date: 9/30/2018 Primary Diagnosis: TIA (transient ischemic attack) Precautions:     
 
ASSESSMENT : 
Based on the objective data described below, the patient presents with c/o posterior headache, RLE weakness and impaired sensation, impaired dynamic standing balance, c/o blurred vision (states this is baseline), following admission for r/o TIA. Pt cleared by RN for therapy. Pt received in bed, alert and oriented x4. ADLs overall IND to CGA and IND for bed mobility, SBA to CGA for functional transfers and mobility. Pt with no impaired coordination in BUEs, mildly decreased strength in B elbow extension with MMT. Pt with mild LOB with transfers and mobility and reported impaired sensation to light touch to RLE. Will follow-up 1-2 more sessions d/t impaired dynamic standing balance affecting independence and safety with ADL/IADL routine and work. Pt awaiting MRI. Pt VSS stable with exception of headache. Pt left in bed, call bell in reach, RN notified. Patient will benefit from skilled intervention to address the above impairments. Patients rehabilitation potential is considered to be Good Factors which may influence rehabilitation potential include:  
[]             None noted []             Mental ability/status [x]             Medical condition []             Home/family situation and support systems []             Safety awareness []             Pain tolerance/management 
[]             Other: PLAN : 
Recommendations and Planned Interventions: [x]               Self Care Training                  [x]        Therapeutic Activities [x]               Functional Mobility Training    [x]        Cognitive Retraining 
[x]               Therapeutic Exercises           [x]        Endurance Activities [x]               Balance Training                   [x]        Neuromuscular Re-Education [x]               Visual/Perceptual Training     [x]   Home Safety Training 
[x]               Patient Education                 [x]        Family Training/Education []               Other (comment): Frequency/Duration: Patient will be followed by occupational therapy 5 times a week to address goals. Discharge Recommendations: To Be Determined and None Further Equipment Recommendations for Discharge: none SUBJECTIVE:  
Patient stated My right leg feels different.  OBJECTIVE DATA SUMMARY:  
HISTORY:  
Past Medical History:  
Diagnosis Date  Hepatitis B immune April 2014  
 sAb (+) after 2 doses vaccine. cAb and sAg negative. [facility] testing.  Immunity status testing April 2014 MMR and VZV immune. 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 IGRA (+) April 2014. CXR negative. Pt did not start therapy until Jan 2015. Pt report CXR repeated prior to 9mo INH started. [facility]  Visual problems--seen by ophth prior to June 2014 appt. 6/16/2014 Notes referred to eye specialist--glasses per pt report not able to help. History reviewed. No pertinent surgical history. Prior Level of Function/Environment/Context: Pt IND, works at [employer]. Lives with family. Occupations in which the patient is/was successful, what are the barriers preventing that success:  
Performance Patterns (routines, roles, habits, and rituals):  
Personal Interests and/or values:  
Expanded or extensive additional review of patient history: Home Situation Home Environment: Private residence # Steps to Enter: 3 One/Two Story Residence: Two story Living Alone: No 
Support Systems: Family member(s), Parent Patient Expects to be Discharged to[de-identified] Private residence Current DME Used/Available at Home: None Tub or Shower Type: Tub/Shower combination Hand dominance: Right EXAMINATION OF PERFORMANCE DEFICITS: 
Cognitive/Behavioral Status: 
Neurologic State: Alert; Appropriate for age Orientation Level: Oriented X4 Cognition: Follows commands; Appropriate safety awareness; Appropriate for age attention/concentration; Appropriate decision making Perception: Appears intact Perseveration: No perseveration noted Safety/Judgement: Awareness of environment; Fall prevention;Good awareness of safety precautions; Home safety; Insight into deficits Skin: appears intact Edema: none noted in BUEs Hearing: Auditory Auditory Impairment: None Vision/Perceptual:   
Tracking: Able to track stimulus in all quadrants w/o difficulty Diplopia: No   
Acuity:  (c/o blurred vision at baseline) Range of Motion: 
 
AROM: Within functional limits PROM: Within functional limits Strength: 
 
Strength: Within functional limits Coordination: 
Coordination: Within functional limits Fine Motor Skills-Upper: Left Intact; Right Intact Gross Motor Skills-Upper: Left Intact; Right Intact Tone & Sensation: 
 
Tone: Normal 
Sensation: Intact Balance: 
Sitting: Intact Standing: Impaired; Without support Standing - Static: Good Standing - Dynamic : Fair Functional Mobility and Transfers for ADLs: 
Bed Mobility: 
Supine to Sit: Independent Sit to Supine: Independent Transfers: 
Sit to Stand: Stand-by assistance Stand to Sit: Stand-by assistance Toilet Transfer : Stand-by assistance;Contact guard assistance ADL Assessment: 
Feeding: Independent Oral Facial Hygiene/Grooming: Independent Bathing: Contact guard assistance; Additional time Upper Body Dressing: Stand-by assistance Lower Body Dressing: Stand-by assistance;Contact guard assistance Toileting: Stand by assistance;Contact guard assistance ADL Intervention and task modifications: 
  
 
  
 
  
 
  
 
  
 
  
 
  
 
Cognitive Retraining Safety/Judgement: Awareness of environment; Fall prevention;Good awareness of safety precautions; Home safety; Insight into deficits Functional Measure: 
Barthel Index: 
 
Bathin Bladder: 10 Bowels: 10 
Groomin Dressing: 10 Feeding: 10 Mobility: 10 Stairs: 0 Toilet Use: 5 Transfer (Bed to Chair and Back): 10 Total: 70 Barthel and G-code impairment scale: 
Percentage of impairment CH 
0% CI 
1-19% CJ 
20-39% CK 
40-59% CL 
60-79% CM 
80-99% CN 
100% Barthel Score 0-100 100 99-80 79-60 59-40 20-39 1-19 
 0 Barthel Score 0-20 20 17-19 13-16 9-12 5-8 1-4 0 The Barthel ADL Index: Guidelines 1. The index should be used as a record of what a patient does, not as a record of what a patient could do. 2. The main aim is to establish degree of independence from any help, physical or verbal, however minor and for whatever reason. 3. The need for supervision renders the patient not independent. 4. A patient's performance should be established using the best available evidence. Asking the patient, friends/relatives and nurses are the usual sources, but direct observation and common sense are also important. However direct testing is not needed. 5. Usually the patient's performance over the preceding 24-48 hours is important, but occasionally longer periods will be relevant. 6. Middle categories imply that the patient supplies over 50 per cent of the effort. 7. Use of aids to be independent is allowed. Bong Harden., Barthel, D.W. ([id number]). Functional evaluation: the Barthel Index. [address] [facility] (14)2. AUGUSTA Hernandez, Donna Guillen., Hermila Sanchez.[facility], [address] (1999). Measuring the change indisability after inpatient rehabilitation; comparison of the responsiveness of the Barthel Index and Functional [place] Measure. Journal of Neurology, Neurosurgery, and Psychiatry, 66(4), 929-712. ROBSON Santiago, YELITZA Alanis, & Brandi Estrada M.A. (2004.) Assessment of post-stroke quality of life in cost-effectiveness studies: The usefulness of the Barthel Index and the EuroQoL-5D. [facility], 37, 539-08 G codes: In compliance with CMSs Claims Based Outcome Reporting, the following G-code set was chosen for this patient based on their primary functional limitation being treated: The outcome measure chosen to determine the severity of the functional limitation was the Barthel index with a score of 70/100 which was correlated with the impairment scale. ? Self Care:  
  - CURRENT STATUS: CJ - 20%-39% impaired, limited or restricted  - GOAL STATUS: CI - 1%-19% impaired, limited or restricted  - D/C STATUS:  ---------------To be determined--------------- Occupational Therapy Evaluation Charge Determination History Examination Decision-Making LOW Complexity : Brief history review  LOW Complexity : 1-3 performance deficits relating to physical, cognitive , or psychosocial skils that result in activity limitations and / or participation restrictions  LOW Complexity : No comorbidities that affect functional and no verbal or physical assistance needed to complete eval tasks Based on the above components, the patient evaluation is determined to be of the following complexity level: LOW Activity Tolerance: VSS Please refer to the flowsheet for vital signs taken during this treatment. After treatment:  
[] Patient left in no apparent distress sitting up in chair 
[x] Patient left in no apparent distress in bed [x] Call bell left within reach [x] Nursing notified 
[] Caregiver present 
[] Bed alarm activated COMMUNICATION/EDUCATION:  
The patients plan of care was discussed with: Physical Therapist and Registered Nurse. [x] Home safety education was provided and the patient/caregiver indicated understanding. [x] Patient/family have participated as able in goal setting and plan of care. [x] Patient/family agree to work toward stated goals and plan of care. [] Patient understands intent and goals of therapy, but is neutral about his/her participation. [] Patient is unable to participate in goal setting and plan of care. This patients plan of care is appropriate for delegation to [facility] [place]. Thank you for this referral. 
Alia Addison OT Time Calculation: 14 mins

## 2018-09-30 NOTE — PROGRESS NOTES
Hospitalist Progress Note Paige Weston MD 
     
                             Answering service: [phone] OR [phone] from in house phone Date of Service:  2018 NAME:  Svetlana Sifuentes :  1980 MRN:  [id number] Admission Summary:  
 
Svetlana Sifuentes is a 41 y.o. male with past medical history of hypertension who was brought to the ED by EMS with headache and right sided weakness Reason for follow up:  
Acute CVA 
-Complains of left thigh numbness persisting. Headache much better. No speech or vision problem. No extremity weakness. Assessment & Plan:  
Acute left thalamic stroke 
-Head CT negative,CTA w/o evidence of large vessel occlusion, hemodynamically significant stenosis, or 
intracranial aneurysm. -MRI,no official report in [address] [place] [address] RN radiology called for acute stroke  
-on aspirin,statin. LDL 55. A1c normal. 
-Neurology evaluated. TTE pending Accelerated hypertension,not taking medications as he should. Permissive hypertensive for now in the wake of acute stroke 
-Educated on diet,low salt,exercise,complaice with medication and follow up. Lisinopril resumed. Headache,occipatl due to elevated BP 
-Head CT,CTA negative. MRI,no official report in [address] [place] [address] RN radiology called for acute stroke Diet:cardiac Code status: full DVT prophylaxis: scd,ambulate. Care Plan discussed with: patient,nurse. Home tomorrow. Hospital Problems  Date Reviewed: 2018 Codes Class Noted POA Vertebrobasilar artery syndrome ICD-10-CM: G45.0 ICD-9-CM: 435.3  2018 Yes * (Principal)Hypertension ICD-10-CM: I10 
ICD-9-CM: 401.9  2018 Yes  
   
 TIA (transient ischemic attack) ICD-10-CM: G45.9 ICD-9-CM: 435.9  2018 Unknown Review of Systems: A comprehensive review of systems was negative except for that written in the HPI. Physical Examination:  
 
 Last 24hrs VS reviewed since prior progress note. Most recent are: 
Visit Vitals  BP (!) 157/102 (BP 1 Location: Left arm, BP Patient Position: At rest)  Pulse (!) 59  Temp 98.6 °F (37 °C)  Resp 24  
 Ht 5' 3\" (1.6 m)  Wt 76.2 kg (168 lb)  SpO2 98%  BMI 29.76 kg/m2 Constitutional:  No acute distress, cooperative, pleasant   
HEENT: Head is a traumatic, Un icteric sclera. Pink conjunctiva,no erythema or discharge. Oral mucous moist, oropharynx benign. Neck supple, Resp:  CTA bilaterally. No wheezing/rhonchi/rales. No accessory muscle use CV:  Regular rhythm, normal rate, no murmurs, gallops, rubs GI:  Soft, non distended, non tender. normoactive bowel sounds, no hepatosplenomegaly :  No CVA or suprapubic tenderness Skin  :  No erythema,rash,bullae,dipigmentation Musculoskeletal:  No edema, warm, 2+ pulses throughout Neurologic:  AAOx3, CN II-XII reviewed. Moves all extremities. Psych:  Good insight, Not anxious nor agitated. Intake/Output Summary (Last 24 hours) at 09/30/18 1815 Last data filed at 09/30/18 1100 Gross per 24 hour Intake              240 ml Output                0 ml Net              240 ml Data Review:  
 Review and/or order of clinical lab test 
Review and/or order of tests in the radiology section of CPT Review and/or order of tests in the medicine section of CPT Labs:  
 
Recent Labs  
   09/29/18 
 1604 WBC  7.1 HGB  13.7 HCT  41.5 PLT  158 Recent Labs  
   09/29/18 
 1604 NA  142  
K  4.1 CL  108 CO2  28 BUN  8  
CREA  1.04 GLU  127* CA  8.6 MG  2.0 Recent Labs  
   09/29/18 
 1604 SGOT  23 ALT  37 AP  69 TBILI  0.4 TP  7.2 ALB  3.7 GLOB  3.5 Recent Labs  
   09/29/18 
 1604 INR  1.0 PTP  10.1 APTT  27.7 No results for input(s): FE, TIBC, PSAT, FERR in the last 72 hours. No results found for: FOL, RBCF No results for input(s): PH, PCO2, PO2 in the last 72 hours. Recent Labs  
   09/29/18 
 1604 TROIQ  <0.05 Lab Results Component Value Date/Time Cholesterol, total 144 09/29/2018 04:04 PM  
 HDL Cholesterol 27 09/29/2018 04:04 PM  
 LDL, calculated 55.4 09/29/2018 04:04 PM  
 Triglyceride 308 (H) 09/29/2018 04:04 PM  
 CHOL/HDL Ratio 5.3 (H) 09/29/2018 04:04 PM  
 
Lab Results Component Value Date/Time Glucose (POC) 121 (H) 09/29/2018 09:51 PM  
 Glucose (POC) 142 (H) 09/29/2018 04:00 PM  
 
Lab Results Component Value Date/Time Color YELLOW/STRAW 09/30/2018 02:30 AM  
 Appearance CLEAR 09/30/2018 02:30 AM  
 Specific gravity 1.022 09/30/2018 02:30 AM  
 pH (UA) 7.0 09/30/2018 02:30 AM  
 Protein NEGATIVE  09/30/2018 02:30 AM  
 Glucose NEGATIVE  09/30/2018 02:30 AM  
 Ketone NEGATIVE  09/30/2018 02:30 AM  
 Bilirubin NEGATIVE  09/30/2018 02:30 AM  
 Urobilinogen 0.2 09/30/2018 02:30 AM  
 Nitrites NEGATIVE  09/30/2018 02:30 AM  
 Leukocyte Esterase NEGATIVE  09/30/2018 02:30 AM  
 Epithelial cells FEW 09/30/2018 02:30 AM  
 Bacteria NEGATIVE  09/30/2018 02:30 AM  
 WBC 0-4 09/30/2018 02:30 AM  
 RBC 5-10 09/30/2018 02:30 AM  
 
 
 
Medications Reviewed:  
 
Current Facility-Administered Medications Medication Dose Route Frequency  lisinopril (PRINIVIL, ZESTRIL) tablet 20 mg  20 mg Oral DAILY  sodium chloride (NS) flush 5-10 mL  5-10 mL IntraVENous Q8H  
 sodium chloride (NS) flush 5-10 mL  5-10 mL IntraVENous PRN  
 acetaminophen (TYLENOL) tablet 650 mg  650 mg Oral Q4H PRN Or  
 acetaminophen (TYLENOL) solution 650 mg  650 mg Per NG tube Q4H PRN Or  
 acetaminophen (TYLENOL) suppository 650 mg  650 mg Rectal Q4H PRN  
 aspirin chewable tablet 81 mg  81 mg Oral DAILY  atorvastatin (LIPITOR) tablet 40 mg  40 mg Oral QHS  labetalol (NORMODYNE;TRANDATE) injection 5 mg  5 mg IntraVENous Q10MIN PRN  
 magnesium hydroxide (MILK OF MAGNESIA) 400 mg/5 mL oral suspension 30 mL  30 mL Oral DAILY PRN  
 enoxaparin (LOVENOX) injection 40 mg  40 mg SubCUTAneous Q24H  
 influenza vaccine 2018-19 (6 mos+)(PF) (FLUARIX QUAD/FLULAVAL QUAD) injection 0.5 mL  0.5 mL IntraMUSCular PRIOR TO DISCHARGE  
 
______________________________________________________________________ EXPECTED LENGTH OF STAY: - - - 
ACTUAL LENGTH OF STAY:          0 Margarito Fournier MD

## 2018-09-30 NOTE — PROGRESS NOTES
0730 - Bedside and Verbal shift change report given to karyna pruett (oncoming nurse) by pierre (offgoing nurse). Report included the following information SBAR, Kardex, Intake/Output, MAR, Recent Results and Cardiac Rhythm NSR.  
1530 - Bedside and Verbal shift change report given to luma (oncoming nurse) by Winfield Sandhoff (offgoing nurse). Report included the following information SBAR, Kardex, Intake/Output, MAR, Recent Results and Cardiac Rhythm NSR.

## 2018-10-01 PROCEDURE — G8978 MOBILITY CURRENT STATUS: HCPCS

## 2018-10-01 PROCEDURE — 97161 PT EVAL LOW COMPLEX 20 MIN: CPT

## 2018-10-01 PROCEDURE — 74011250637 HC RX REV CODE- 250/637: Performed by: HOSPITALIST

## 2018-10-01 PROCEDURE — 74011250637 HC RX REV CODE- 250/637: Performed by: FAMILY MEDICINE

## 2018-10-01 PROCEDURE — G8979 MOBILITY GOAL STATUS: HCPCS

## 2018-10-01 PROCEDURE — 74011250636 HC RX REV CODE- 250/636: Performed by: FAMILY MEDICINE

## 2018-10-01 PROCEDURE — 97116 GAIT TRAINING THERAPY: CPT

## 2018-10-01 PROCEDURE — 65660000000 HC RM CCU STEPDOWN

## 2018-10-01 PROCEDURE — 93306 TTE W/DOPPLER COMPLETE: CPT

## 2018-10-01 RX ORDER — ATORVASTATIN CALCIUM 40 MG/1
40 TABLET, FILM COATED ORAL
Qty: 30 TAB | Refills: 0 | Status: SHIPPED | OUTPATIENT
Start: 2018-10-01 | End: 2018-10-31

## 2018-10-01 RX ORDER — GUAIFENESIN 100 MG/5ML
81 LIQUID (ML) ORAL DAILY
Qty: 30 TAB | Refills: 0 | Status: SHIPPED
Start: 2018-10-02 | End: 2018-11-01

## 2018-10-01 RX ORDER — HYDROCHLOROTHIAZIDE 25 MG/1
25 TABLET ORAL DAILY
Status: DISCONTINUED | OUTPATIENT
Start: 2018-10-02 | End: 2018-10-02 | Stop reason: HOSPADM

## 2018-10-01 RX ORDER — DIPHENHYDRAMINE HCL 25 MG
25 CAPSULE ORAL
Status: DISCONTINUED | OUTPATIENT
Start: 2018-10-01 | End: 2018-10-01

## 2018-10-01 RX ORDER — LISINOPRIL 20 MG/1
20 TABLET ORAL DAILY
Qty: 30 TAB | Refills: 0 | Status: SHIPPED | OUTPATIENT
Start: 2018-10-01 | End: 2018-10-01

## 2018-10-01 RX ORDER — LISINOPRIL 20 MG/1
40 TABLET ORAL DAILY
Status: DISCONTINUED | OUTPATIENT
Start: 2018-10-02 | End: 2018-10-01

## 2018-10-01 RX ORDER — HYDROCHLOROTHIAZIDE 25 MG/1
25 TABLET ORAL ONCE
Status: COMPLETED | OUTPATIENT
Start: 2018-10-01 | End: 2018-10-01

## 2018-10-01 RX ORDER — AMLODIPINE BESYLATE 5 MG/1
10 TABLET ORAL DAILY
Status: DISCONTINUED | OUTPATIENT
Start: 2018-10-02 | End: 2018-10-02 | Stop reason: HOSPADM

## 2018-10-01 RX ORDER — HYDROCHLOROTHIAZIDE 25 MG/1
25 TABLET ORAL DAILY
Status: DISCONTINUED | OUTPATIENT
Start: 2018-10-01 | End: 2018-10-01

## 2018-10-01 RX ADMIN — Medication 10 ML: at 06:26

## 2018-10-01 RX ADMIN — ENOXAPARIN SODIUM 40 MG: 40 INJECTION, SOLUTION INTRAVENOUS; SUBCUTANEOUS at 08:39

## 2018-10-01 RX ADMIN — Medication 10 ML: at 16:36

## 2018-10-01 RX ADMIN — LISINOPRIL 20 MG: 20 TABLET ORAL at 08:40

## 2018-10-01 RX ADMIN — Medication 10 ML: at 21:03

## 2018-10-01 RX ADMIN — ASPIRIN 81 MG CHEWABLE TABLET 81 MG: 81 TABLET CHEWABLE at 08:39

## 2018-10-01 RX ADMIN — ATORVASTATIN CALCIUM 40 MG: 40 TABLET, FILM COATED ORAL at 21:02

## 2018-10-01 RX ADMIN — DIPHENHYDRAMINE HYDROCHLORIDE 25 MG: 25 CAPSULE ORAL at 16:36

## 2018-10-01 RX ADMIN — HYDROCHLOROTHIAZIDE 25 MG: 25 TABLET ORAL at 16:36

## 2018-10-01 NOTE — PROGRESS NOTES
New PCP appointment on Monday October 15,2018 @ 1:15 p.m. With [facility]. Added to AVS. Catalina Avial CM Specialist

## 2018-10-01 NOTE — PROGRESS NOTES
Consult received and appreciated. Reviewed chart and discussed case with nsg. Nsg dysphagia screen completed and WNL and patient is tolerating a diet without difficulty. MRI positive for acute infarct per neurologist note, however,  no concerns regarding speech or swallowing function with nsg and MD reporting fluent language with no dysarthria or word finding difficulties. Per neurologists note, formal therapy evaluations are not indicated. Will sign off. Please re-consult if further needs arise. Thanks. Linnea Almodovar M.CD.  CCC-SLP

## 2018-10-01 NOTE — PROGRESS NOTES
Problem: Falls - Risk of 
Goal: *Absence of Falls Document Kishore Ferrer Fall Risk and appropriate interventions in the flowsheet. Outcome: Progressing Towards Goal 
Fall Risk Interventions: 
Mobility Interventions: Communicate number of staff needed for ambulation/transfer, PT Consult for mobility concerns, PT Consult for assist device competence Medication Interventions: Patient to call before getting OOB, Teach patient to arise slowly Elimination Interventions: Call light in reach, Toileting schedule/hourly rounds

## 2018-10-01 NOTE — PROGRESS NOTES
Bedside and Verbal shift change report given to Jey Blankenship RN (oncoming nurse) by Cathy Mcwilliams RN (offgoing nurse). Report included the following information SBAR, Kardex, Procedure Summary, Intake/Output, MAR, Recent Results and Cardiac Rhythm NSR.

## 2018-10-01 NOTE — PROGRESS NOTES
Problem: Falls - Risk of 
Goal: *Absence of Falls Document Clifm Furth Fall Risk and appropriate interventions in the flowsheet. Outcome: Progressing Towards Goal 
Fall Risk Interventions: 
Mobility Interventions: Patient to call before getting OOB Medication Interventions: Patient to call before getting OOB Elimination Interventions: Patient to call for help with toileting needs

## 2018-10-01 NOTE — PROGRESS NOTES
Bedside and Verbal shift change report given to Maira Cornejo (oncoming nurse) by Bao Mckeon (offgoing nurse). Report included the following information SBAR, MAR and Recent Results.

## 2018-10-01 NOTE — PROGRESS NOTES
Reason for Admission:  Patient presented with CVA work-up RRAT Score:  4 Plan for utilizing home health: PT recommending home health- patient is uninsured, will need bon visits- will send referral to [facility], will need orders for home health PT Likelihood of Readmission:  Low Transition of Care Plan: Anticipate home with home health charitable services The CM met with patient at bedside in order to introduce the role of CM and assess for patient needs. The patient lives at home with his family- verified demographics. The patient is currently uninsured, stated Dr. Kristen Boykin is PCP- saw approximately one year ago, however, since patient is currently uninsured, wanting to go to [address] [place] [address]. The CM contacted Care Management Specialist to assist in establishing intake appointment and provided the patient with application at bedside. The CM also provided the patient with a Care Card application. The patient endorses being independent prior to hospitalization- works at [employer]. The patient was not taking medications prior to hospitalization. The patient endorses having transportation home upon discharge. PT currently recommending home health- will need home health PT orders, will require charitable visit. The CM will send referral to [facility] when orders available. ROBERTO Deleon Patient agreeable for referral to be sent to [facility] for [facility] PT for obn- patient states family will provide transportation. ROBERTO Deleon 
 
11:58 a.m.- CM spoke with Ebonie Olmos with [facility]- unclear at this time if they will be able to accept patient due to concerns regarding PCP- [facility] scheduled for intake on 10/15.  
 
14:25 p.m.- the CM reviewed medications- Lipitor 30 tablets is $9.00 at [facility], and Lisinopril is on the $4 list at [facility]. ROBERTO Deleon Care Management Interventions PCP Verified by CM: Yes (Patient's PCP is listed as Dr. Lily Jean- patient is now uninsured, CM contacted Care Management Specialist to assist with appointment at [address] [place] [address]) Mode of Transport at Discharge: Other (see comment) (Patient endorses that he has transportation home upon discharge- anticipate family) Transition of Care Consult (CM Consult): Discharge Planning MyChart Signup: No 
Discharge Durable Medical Equipment: No 
Health Maintenance Reviewed: Yes Physical Therapy Consult: Yes Occupational Therapy Consult: Yes Speech Therapy Consult: Yes Current Support Network: Lives with Spouse, Own Home Confirm Follow Up Transport: Family Plan discussed with Pt/Family/Caregiver: Yes [employer] Information Provided?: No 
Discharge Location Discharge Placement: Home (CM will await PT/OT evaluations for services needed upon discharge)

## 2018-10-01 NOTE — PROGRESS NOTES
Hospitalist Progress Note Jorge Soriano MD 
     
                             Answering service: [phone] OR [phone] from in house phone Date of Service:  10/1/2018 NAME:  Shayne Andrews :  1980 MRN:  [id number] Admission Summary:  
 
Shayne Andrews is a 41 y.o. male with past medical history of hypertension who was brought to the ED by EMS with headache and right sided weakness Reason for follow up:  
Acute CVA 
-He was to be discharged. He had me called and told me his lips felt numb and swollen since last night. There is mild lip swelling. No tongue swelling. No resp difficulty. This is likely lisinopril allergy. D/c lisinopril,watch over night. Assessment & Plan:  
Acute left thalamic stroke 
-Head CT negative,CTA w/o evidence of large vessel occlusion, hemodynamically significant stenosis, or 
intracranial aneurysm. -MRI,Acute left thalamic lacunar infarct. 
-on aspirin,statin. LDL 55. A1c normal. 
-Neurology evaluated. -TTE unremarkable. Accelerated hypertension,not taking medications as he should. Permissive hypertensive for now in the wake of acute stroke 
-Educated on diet,low salt,exercise,complaice with medication and follow up. Lisinopril resumed. Headache,occipatl due to elevated BP 
-Head CT,CTA negative. MRI,Acute left thalamic lacunar infarct. - 
 
Possible angioedema from lisinopril 
-D/c lisinopril 
-_benadryl 
-Cancel d/c and watch over night. 
-Start amlodipine and hctz. Diet:cardiac Code status: full DVT prophylaxis: scd,ambulate. Care Plan discussed with: patient,nurse. Home in AM. Hospital Problems  Date Reviewed: 2018 Codes Class Noted POA Acute CVA (cerebrovascular accident) ([facility] [address] ICD-10-CM: I63.9 ICD-9-CM: 434.91  2018 Unknown Vertebrobasilar artery syndrome ICD-10-CM: G45.0 ICD-9-CM: 435.3  9/29/2018 Yes * (Principal)Hypertension ICD-10-CM: I10 
ICD-9-CM: 401.9  9/29/2018 Yes  
   
 TIA (transient ischemic attack) ICD-10-CM: G45.9 ICD-9-CM: 435.9  9/29/2018 Unknown Review of Systems: A comprehensive review of systems was negative except for that written in the HPI. Physical Examination:  
 
 Last 24hrs VS reviewed since prior progress note. Most recent are: 
Visit Vitals  BP (!) 171/102 (BP 1 Location: Left arm, BP Patient Position: At rest)  Pulse 73  Temp 98.1 °F (36.7 °C)  Resp 20  
 Ht 5' 3\" (1.6 m)  Wt 77.3 kg (170 lb 8 oz)  SpO2 99%  BMI 30.2 kg/m2 Constitutional:  No acute distress, cooperative, pleasant   
HEENT: Head is a traumatic, Un icteric sclera. Pink conjunctiva,no erythema or discharge. Oral mucous moist, oropharynx benign. Neck supple,  
Mild lips swelling. Resp:  CTA bilaterally. No wheezing/rhonchi/rales. No accessory muscle use CV:  Regular rhythm, normal rate, no murmurs, gallops, rubs GI:  Soft, non distended, non tender. normoactive bowel sounds, no hepatosplenomegaly :  No CVA or suprapubic tenderness Skin  :  No erythema,rash,bullae,dipigmentation Musculoskeletal:  No edema, warm, 2+ pulses throughout Neurologic:  AAOx3, CN II-XII reviewed. Moves all extremities. Psych:  Good insight, Not anxious nor agitated. No intake or output data in the 24 hours ending 10/01/18 1620 Data Review:  
 Review and/or order of clinical lab test 
Review and/or order of tests in the radiology section of CPT Review and/or order of tests in the medicine section of CPT Labs:  
 
Recent Labs  
   09/29/18 
 1604 WBC  7.1 HGB  13.7 HCT  41.5 PLT  158 Recent Labs  
   09/29/18 
 1604 NA  142  
K  4.1 CL  108 CO2  28 BUN  8  
CREA  1.04 GLU  127* CA  8.6 MG  2.0 Recent Labs  
   09/29/18 
 1604 SGOT  23  
 ALT  37 AP  69 TBILI  0.4 TP  7.2 ALB  3.7 GLOB  3.5 Recent Labs  
   09/29/18 
 1604 INR  1.0 PTP  10.1 APTT  27.7 No results for input(s): FE, TIBC, PSAT, FERR in the last 72 hours. No results found for: FOL, RBCF No results for input(s): PH, PCO2, PO2 in the last 72 hours. Recent Labs  
   09/29/18 
 1604 TROIQ  <0.05 Lab Results Component Value Date/Time Cholesterol, total 144 09/29/2018 04:04 PM  
 HDL Cholesterol 27 09/29/2018 04:04 PM  
 LDL, calculated 55.4 09/29/2018 04:04 PM  
 Triglyceride 308 (H) 09/29/2018 04:04 PM  
 CHOL/HDL Ratio 5.3 (H) 09/29/2018 04:04 PM  
 
Lab Results Component Value Date/Time Glucose (POC) 121 (H) 09/29/2018 09:51 PM  
 Glucose (POC) 142 (H) 09/29/2018 04:00 PM  
 
Lab Results Component Value Date/Time Color YELLOW/STRAW 09/30/2018 02:30 AM  
 Appearance CLEAR 09/30/2018 02:30 AM  
 Specific gravity 1.022 09/30/2018 02:30 AM  
 pH (UA) 7.0 09/30/2018 02:30 AM  
 Protein NEGATIVE  09/30/2018 02:30 AM  
 Glucose NEGATIVE  09/30/2018 02:30 AM  
 Ketone NEGATIVE  09/30/2018 02:30 AM  
 Bilirubin NEGATIVE  09/30/2018 02:30 AM  
 Urobilinogen 0.2 09/30/2018 02:30 AM  
 Nitrites NEGATIVE  09/30/2018 02:30 AM  
 Leukocyte Esterase NEGATIVE  09/30/2018 02:30 AM  
 Epithelial cells FEW 09/30/2018 02:30 AM  
 Bacteria NEGATIVE  09/30/2018 02:30 AM  
 WBC 0-4 09/30/2018 02:30 AM  
 RBC 5-10 09/30/2018 02:30 AM  
 
 
 
Medications Reviewed:  
 
Current Facility-Administered Medications Medication Dose Route Frequency  diphenhydrAMINE (BENADRYL) capsule 25 mg  25 mg Oral Q6H PRN  
 [START ON 10/2/2018] amLODIPine (NORVASC) tablet 10 mg  10 mg Oral DAILY  hydroCHLOROthiazide (HYDRODIURIL) tablet 25 mg  25 mg Oral ONCE  
 [START ON 10/2/2018] hydroCHLOROthiazide (HYDRODIURIL) tablet 25 mg  25 mg Oral DAILY  sodium chloride (NS) flush 5-10 mL  5-10 mL IntraVENous Q8H  
 sodium chloride (NS) flush 5-10 mL  5-10 mL IntraVENous PRN  
  acetaminophen (TYLENOL) tablet 650 mg  650 mg Oral Q4H PRN Or  
 acetaminophen (TYLENOL) solution 650 mg  650 mg Per NG tube Q4H PRN Or  
 acetaminophen (TYLENOL) suppository 650 mg  650 mg Rectal Q4H PRN  
 aspirin chewable tablet 81 mg  81 mg Oral DAILY  atorvastatin (LIPITOR) tablet 40 mg  40 mg Oral QHS  labetalol (NORMODYNE;TRANDATE) injection 5 mg  5 mg IntraVENous Q10MIN PRN  
 magnesium hydroxide (MILK OF MAGNESIA) 400 mg/5 mL oral suspension 30 mL  30 mL Oral DAILY PRN  
 enoxaparin (LOVENOX) injection 40 mg  40 mg SubCUTAneous Q24H  
 influenza vaccine 2018-19 (6 mos+)(PF) (FLUARIX QUAD/FLULAVAL QUAD) injection 0.5 mL  0.5 mL IntraMUSCular PRIOR TO DISCHARGE  
 
______________________________________________________________________ EXPECTED LENGTH OF STAY: 2d 4h 
ACTUAL LENGTH OF STAY:          1 Bhaskar Arita MD

## 2018-10-01 NOTE — CONSULTS
[address]    Name:URBAN RAYMOND  MR#: [id number]  : 1980  ACCOUNT #: [de-identified]   DATE OF SERVICE: 2018    HISTORY OF PRESENT ILLNESS:  This is a 60-year-old right-handed male who was admitted on 18, yesterday, reporting a headache on the  and then spinning, dizziness yesterday prior to admission and then when he got up to walk to bed, he noticed his right leg was weak and numb. According to chart notes, at some point, he could not move his leg at all. His weakness reportedly resolved after an hour. I have difficulty obtaining such a clear history from the patient. He often repeats himself rather than answering my questions. He does report ongoing right thigh numbness, otherwise,  his symptoms have resolved. His blood pressure was 181/104 in the ED and does have a known history of hypertension and is noncompliant with medications at home. He denies having history of headache. No headache now. No nausea, vomiting, no vision changes, no trouble with speech or swallowing. PAST MEDICAL HISTORY:   1. History of latent TB treated with INH. 2.  Hypertension. REVIEW OF SYSTEMS:  As per past medical history, HPI, otherwise reviewed and negative. MEDICATIONS  At home, he was supposed to be taking lisinopril, but was not. ALLERGIES:  NONE. SOCIAL HISTORY:  Lives in [place] with his mother, father and brother. He works in a hotel. He smokes. He denies alcohol or drug use. FAMILY HISTORY:  No strokes in the family. Dad has hypertension. He has 1 daughter who is healthy. PHYSICAL EXAMINATION:  VITAL SIGNS:  Blood pressure 154/92, pulse 71, respiratory rate 19, satting 97% on room air. Temperature is 98.1. His BMI is 29.7. GENERAL:  He is a well-nourished, well-developed male sitting in bed in no distress. CARDIOVASCULAR:  Has regular rate and rhythm without murmurs. His carotids are 2+. No bruits.   EXTREMITIES:  Warm without edema and 2+ radial pulses. NEUROLOGIC:  Mental status:  Alert and oriented x4. Speech is nondysarthric. Language intact. Attention, fund of knowledge appropriate. Memory intact. Motor exam 5/5 strength throughout without pronator drift and no tremor. His sensory exam was decreased only in the right lateral femoral cutaneous nerve of the thigh distribution. Reflexes were 2+ and symmetric. Toes downgoing. His coordination was intact to finger-to-nose, heel-to-shin, rapid alternating movements. Gait is not assessed at this time. STUDIES AND REPORTS:  His CMP had a glucose of 127. Hemoglobin A1c is 5.4. LDL 55. LABORATORY DATA:  CBC, CK troponin I, magnesium, TSH and UA were all unremarkable. He had a CTA of the head and neck and a CT presentation, which were unremarkable. His MRI of the brain was just completed and final report is pending. He appears to have a tiny ischemic stroke in the left thalamus. His echocardiogram is pending. ASSESSMENT AND PLAN:  This is a 81-year-old right-handed male with history of hypertension and noncompliance therapy, presenting with a blood pressure of 181/104 with complaints of headaches, spinning, dizziness and right-sided weakness and numbness now all resolved except for numbness in the right anterolateral thigh with a nonfocal exam who in fact does have a positive MRI with a small ischemic stroke in the left thalamus. Agree with aspirin 81 mg a day as started. Recommend maximizing medical management of his hypertension. Echocardiogram is pending. Recommend counseling for smoking cessation. The patient will need stroke education. He does not appear to need PT, OT or speech therapy. If his echocardiogram is unremarkable, he is stable for discharge from a neurological perspective. He should follow up with his PCP. Please call if needed.       MD EMILY Bland / Aline Starkey  D: 09/30/2018 21:08     T: 09/30/2018 21:40  JOB #: [id number]

## 2018-10-01 NOTE — PROGRESS NOTES
Problem: Mobility Impaired (Adult and Pediatric) Goal: *Acute Goals and Plan of Care (Insert Text) Physical Therapy Goals Initiated 10/1/2018 1. Patient will move from supine to sit and sit to supine , scoot up and down and roll side to side in bed with independence within 7 day(s). 2.  Patient will transfer from bed to chair and chair to bed with independence using the least restrictive device within 7 day(s). 3.  Patient will perform sit to stand with independence within 7 day(s). 4.  Patient will ambulate with independence for 150 feet with the least restrictive device within 7 day(s). 5.  Patient will ascend/descend 3 stairs with 1 handrail(s) with independence within 7 day(s). 6.  Patient will improve Lopez Balance score by 7 points within 7 days. physical Therapy EVALUATION- neuro population Patient: Ju Stephens (42 y.o. male) Date: 10/1/2018 Primary Diagnosis: TIA (transient ischemic attack) Acute CVA (cerebrovascular accident) ([facility] [address] Precautions:  Fall ASSESSMENT : 
Based on the objective data described below, the patient presents with impaired mobility compared to baseline function prior to admission secondary to diminished sensation right quad, hypertension, and impaired balance. RN cleared patient for mobility assessment at this time. Patient received lying in bed able to transfer to sitting EOB with SBA. Patient able to put on his shoes with SPV due to impaired dynamic sitting balance. Sit<>stand transfers with SBA. Patient ambulated 100ft with CGA due to unsteadiness. Patient demonstrated narrowed WILLIAM, decreased step length, decreased gait velocity, and increased trunk sway. Navigated 3 steps with 1 handrail with CGA to simulate entrance/exit to home. Patient's BP was elevated throughout session (see vitals below). Patient complained of diminished sensation in R quad throughout session.   Session ended with patient sitting up in chair with all needs met. Educated on BE FAST. Of note, patient lives with multiple family members in a 2 story home with his bedroom/bathroom on the first floor. There are 3 AMADOU with B handrails that are too wide to reach at the same time. 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 Recommending HH PT upon discharge to continue optimizing patient safety and independence with mobility. Patient will benefit from skilled intervention to address the above impairments. Patients rehabilitation potential is considered to be Good Factors which may influence rehabilitation potential include:  
[x]           None noted 
[]           Mental ability/status []           Medical condition 
[]           Home/family situation and support systems 
[]           Safety awareness 
[]           Pain tolerance/management 
[]           Other: PLAN : 
Recommendations and Planned Interventions: 
[x]             Bed Mobility Training             []      Neuromuscular Re-Education [x]             Transfer Training                   []      Orthotic/Prosthetic Training 
[x]             Gait Training                         []      Modalities [x]             Therapeutic Exercises           []      Edema Management/Control [x]             Therapeutic Activities            [x]      Patient and Family Training/Education []             Other (comment): Frequency/Duration: Patient will be followed by physical therapy 4 times a week to address goals. Discharge Recommendations: Home Health Further Equipment Recommendations for Discharge: TBD SUBJECTIVE:  
Patient stated I was normal before this.  OBJECTIVE DATA SUMMARY:  
HISTORY:   
Past Medical History:  
Diagnosis Date  Hepatitis B immune April 2014  
 sAb (+) after 2 doses vaccine. cAb and sAg negative. [facility] testing.  Immunity status testing April 2014 MMR and VZV immune. 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 IGRA (+) April 2014. CXR negative. Pt did not start therapy until Jan 2015. Pt report CXR repeated prior to 9mo INH started. [facility].  Visual problems--seen by ophth prior to June 2014 appt. 6/16/2014 Notes referred to eye specialist--glasses per pt report not able to help. History reviewed. No pertinent surgical history. Prior Level of Function/Home Situation: works at [employer], independent, lives in 2 story home with first floor setup with multiple family members with 3 AMADOU. Personal factors and/or comorbidities impacting plan of care: [facility] Home Situation Home Environment: Private residence # Steps to Enter: 3 Rails to Enter: Yes Hand Rails : Bilateral (too wide to reach both) One/Two Story Residence: Two story, live on 1st floor Living Alone: No 
Support Systems: Family member(s) Patient Expects to be Discharged to[de-identified] Private residence Current DME Used/Available at Home: None Tub or Shower Type: Tub/Shower combination EXAMINATION/PRESENTATION/DECISION MAKING:  
Critical Behavior: 
Neurologic State: Alert Orientation Level: Oriented X4 Cognition: Appropriate for age attention/concentration Safety/Judgement: Awareness of environment, Fall prevention, Good awareness of safety precautions, Home safety, Insight into deficits Hearing: Auditory Auditory Impairment: None Range Of Motion: 
AROM: Within functional limits PROM: Within functional limits Strength:   
Strength: Within functional limits Tone & Sensation:  
Tone: Normal 
  
  
  
  
Sensation: Impaired (R quad ) Coordination: 
Coordination: Grossly decreased, non-functional 
Vision:  
  
Functional Mobility: 
Bed Mobility: 
  
Supine to Sit: Independent Transfers: 
Sit to Stand: Stand-by assistance Stand to Sit: Stand-by assistance Bed to Chair: Contact guard assistance Balance:  
Sitting: Impaired Sitting - Static: Good (unsupported) Sitting - Dynamic: Fair (occasional) Standing: Impaired; Without support Standing - Static: Good Standing - Dynamic : Fair Ambulation/Gait Training: 
Distance (ft): 100 Feet (ft) Assistive Device: Gait belt Ambulation - Level of Assistance: Contact guard assistance Gait Abnormalities: Decreased step clearance;Trunk sway increased Base of Support: Narrowed Speed/Aaliyah: Slow Step Length: Left shortened;Right shortened Stair Training: 
Number of Stairs Trained: 3 Stairs - Level of Assistance: Contact guard assistance;Minimum assistance Rail Use: Right Functional Measure Lopez Balance Test: 
 
Sitting to Standing: 3 Standing Unsupported: 1 Sitting with Back Unsupported: 4 Standing to Sitting: 3 Transfers: 1 Standing Unsupported with Eyes Closed: 0 Standing Unsupported with Feet Together: 0 Reach Forward with Outstretched Arm: 1  Object: 3 Turn to Look Over Shoulders: 2 Turn 360 Degrees: 1 Alternate Foot on Step/Stool: 2 Standing Unsupported One Foot in Front: 0 Stand on One Le Total: 21 
  
 
 
56=Maximum possible score;  
0-20=High fall risk 21-40=Moderate fall risk 41-56=Low fall risk Lopez Balance Test and G-code impairment scale: 
Percentage of Impairment CH 
 
0% 
 CI 
 
1-19% CJ 
 
20-39% CK 
 
40-59% CL 
 
60-79% CM 
 
80-99% CN  
 
100% Pegge Awkward Score 0-56 56 45-55 34-44 23-33 12-22 1-11 0  
 
 
G codes: In compliance with CMSs Claims Based Outcome Reporting, the following G-code set was chosen for this patient based on their primary functional limitation being treated: The outcome measure chosen to determine the severity of the functional limitation was the Pegge Awkward with a score of 21/56 which was correlated with the impairment scale. ? Mobility - Walking and Moving Around:  
  - CURRENT STATUS: CL - 60%-79% impaired, limited or restricted  - GOAL STATUS: CK - 40%-59% impaired, limited or restricted  - D/C STATUS:  ---------------To be determined--------------- Physical Therapy Evaluation Charge Determination History Examination Presentation Decision-Making LOW Complexity : Zero comorbidities / personal factors that will impact the outcome / POC HIGH Complexity : 4+ Standardized tests and measures addressing body structure, function, activity limitation and / or participation in recreation  LOW Complexity : Stable, uncomplicated  LOW Complexity : FOTO score of  Based on the above components, the patient evaluation is determined to be of the following complexity level: LOW Activity Tolerance:  
BP sitting pre-activity: 162/104 BP standin/96 BP sitting post-activity: 154/98 Please refer to the flowsheet for vital signs taken during this treatment. After treatment:  
[x]     Patient left in no apparent distress sitting up in chair 
[]     Patient left in no apparent distress in bed 
[x]     Call bell left within reach [x]     Nursing notified 
[]     Caregiver present 
[]     Bed alarm activated COMMUNICATION/EDUCATION:  
The patients plan of care was discussed with: Registered Nurse. Patient was educated regarding his deficit(s) of unsteadiness as this relates to his diagnosis of TIA. He demonstrated Good understanding as evidenced by nodding. Patient and/or family was verbally educated on the BE FAST acronym for signs/symptoms of CVA and TIA. BE FAST was written on patient's communication board  for visual education and reinforcement. All questions answered with patient indicating good understanding. []  Fall prevention education was provided and the patient/caregiver indicated understanding. []  Patient/family have participated as able in goal setting and plan of care. []  Patient/family agree to work toward stated goals and plan of care. []  Patient understands intent and goals of therapy, but is neutral about his/her participation. []  Patient is unable to participate in goal setting and plan of care. Thank you for this referral. 
Patt Marroquin, PT, DPT Time Calculation: 25 mins

## 2018-10-01 NOTE — PROGRESS NOTES
Pharmacist Discharge Medication Reconciliation Discharging Provider: Dr. Dustin Roblero Significant PMH:  
Past Medical History:  
Diagnosis Date  Hepatitis B immune April 2014  
 sAb (+) after 2 doses vaccine. cAb and sAg negative. [facility] testing.  Immunity status testing April 2014 MMR and VZV immune. 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 IGRA (+) April 2014. CXR negative. Pt did not start therapy until Jan 2015. Pt report CXR repeated prior to 9mo INH started. [facility].  Visual problems--seen by ophth prior to June 2014 appt. 6/16/2014 Notes referred to eye specialist--glasses per pt report not able to help. Chief Complaint for this Admission: Chief Complaint Patient presents with  
 Headache  Extremity Weakness Right Side Allergies: Review of patient's allergies indicates no known allergies. Discharge Medications:  
Current Discharge Medication List  
  
 
START taking these medications Details  
aspirin 81 mg chewable tablet Take 1 Tab by mouth daily for 30 days. Qty: 30 Tab, Refills: 0  
  
atorvastatin (LIPITOR) 40 mg tablet Take 1 Tab by mouth nightly for 30 days. Qty: 30 Tab, Refills: 0 CONTINUE these medications which have CHANGED Details  
lisinopril (PRINIVIL, ZESTRIL) 20 mg tablet Take 1 Tab by mouth daily for 30 days. Qty: 30 Tab, Refills: 0 Associated Diagnoses: Essential hypertension The patient's chart, MAR and AVS were reviewed by Elizabeth Freed.

## 2018-10-02 ENCOUNTER — HOME HEALTH ADMISSION (OUTPATIENT)
Dept: HOME HEALTH SERVICES | Facility: HOME HEALTH | Age: 38
End: 2018-10-02
Payer: COMMERCIAL

## 2018-10-02 VITALS
HEIGHT: 63 IN | WEIGHT: 168.21 LBS | TEMPERATURE: 98.1 F | OXYGEN SATURATION: 99 % | BODY MASS INDEX: 29.8 KG/M2 | SYSTOLIC BLOOD PRESSURE: 155 MMHG | HEART RATE: 78 BPM | RESPIRATION RATE: 20 BRPM | DIASTOLIC BLOOD PRESSURE: 96 MMHG

## 2018-10-02 PROCEDURE — 90471 IMMUNIZATION ADMIN: CPT

## 2018-10-02 PROCEDURE — 74011250636 HC RX REV CODE- 250/636: Performed by: FAMILY MEDICINE

## 2018-10-02 PROCEDURE — 90686 IIV4 VACC NO PRSV 0.5 ML IM: CPT | Performed by: FAMILY MEDICINE

## 2018-10-02 PROCEDURE — 74011250637 HC RX REV CODE- 250/637: Performed by: FAMILY MEDICINE

## 2018-10-02 PROCEDURE — 74011250637 HC RX REV CODE- 250/637: Performed by: HOSPITALIST

## 2018-10-02 RX ORDER — AMLODIPINE BESYLATE 10 MG/1
10 TABLET ORAL DAILY
Qty: 30 TAB | Refills: 0 | Status: SHIPPED | OUTPATIENT
Start: 2018-10-03 | End: 2018-10-02

## 2018-10-02 RX ORDER — HYDROCHLOROTHIAZIDE 25 MG/1
25 TABLET ORAL DAILY
Qty: 30 TAB | Refills: 0 | Status: SHIPPED | OUTPATIENT
Start: 2018-10-03 | End: 2018-11-06 | Stop reason: SDUPTHER

## 2018-10-02 RX ORDER — AMLODIPINE BESYLATE 10 MG/1
10 TABLET ORAL DAILY
Qty: 30 TAB | Refills: 0 | Status: SHIPPED | OUTPATIENT
Start: 2018-10-03 | End: 2018-11-06 | Stop reason: SDUPTHER

## 2018-10-02 RX ORDER — HYDROCHLOROTHIAZIDE 25 MG/1
25 TABLET ORAL DAILY
Qty: 30 TAB | Refills: 0 | Status: SHIPPED | OUTPATIENT
Start: 2018-10-03 | End: 2018-10-02

## 2018-10-02 RX ADMIN — ENOXAPARIN SODIUM 40 MG: 40 INJECTION, SOLUTION INTRAVENOUS; SUBCUTANEOUS at 08:49

## 2018-10-02 RX ADMIN — INFLUENZA VIRUS VACCINE 0.5 ML: 15; 15; 15; 15 SUSPENSION INTRAMUSCULAR at 10:07

## 2018-10-02 RX ADMIN — AMLODIPINE BESYLATE 10 MG: 5 TABLET ORAL at 08:50

## 2018-10-02 RX ADMIN — HYDROCHLOROTHIAZIDE 25 MG: 25 TABLET ORAL at 08:50

## 2018-10-02 RX ADMIN — Medication 10 ML: at 06:24

## 2018-10-02 RX ADMIN — ASPIRIN 81 MG CHEWABLE TABLET 81 MG: 81 TABLET CHEWABLE at 08:50

## 2018-10-02 NOTE — ROUTINE PROCESS
I have reviewed discharge instructions with the patient. The patient verbalized understanding. Discharge medications reviewed with patient and appropriate educational materials and side effects teaching were provided. Stroke Education documented in Patient Education: YES Core Measures Documented in [facility]: 
Risk Factors: YES Warning signs of stroke: YES When to Activate 911: YES Medication Education for Risk Factors: YES Smoking cessation if applicable: NO 
Written Education Given:  YES Discharge NIH Completed: YES Score: 0 BRAINS: YES Follow Up Appointment Made: YES Date/Time if applicable: 80/77 Bedside RN performed patient education and medication education. Discharge concerns initiated and discussed with patient, including clarification on \"who\" assists the patient at their home and instructions for when the home going patient should call their provider after discharge. Opportunity for questions and clarification was provided. Patient receptive to education: YES Patient stated: \"I understand\" Barriers to Education: none Diagnosis Education given:  YES Length of stay: 2 Expected Day of Discharge: 2 Ask if they have \"Help at Home\" & add to white board? YES Education Day #: 2 Medication Education Given:  YES 
M in the box Medication name: Lipitor Pt aware of HCAHPS survey: YES

## 2018-10-02 NOTE — PROGRESS NOTES
CM priced medications- the patient's hydrochlorothiazide 25 mg tablet is on the $4.00 list at [facility] according to [facility], Lipitor is $9.00 at [facility], asprin is OTC, and CM provided the patient with a coupon of $13.98 for Norvasc at [facility], total medications are $26.98. CM met with patient and children at bedside- verbalized they can afford that price. The patient has been authorized for [facility] [place] visits through [facility]. CM verbalized the importance of following-up with free clinic. Family will provide transportation home. ROBERTO Ayoub Frames with [facility] requesting MSV[facility] [place] orders to assist patient given he is uninsured- orders added.  ROBERTO Ayoub

## 2018-10-02 NOTE — DISCHARGE INSTRUCTIONS
Stroke: Care Instructions  Your Care Instructions    You have had a stroke. This means that the blood flow to a part of your brain was blocked for some time, which damages the nerve cells in that part of the brain. The part of your body controlled by that part of your brain may not function properly now. The brain is an amazing organ that can heal itself to some degree. The stroke you had damaged part of your brain. But other parts of your brain may take over in some way for the damaged areas. You have already started this process. Your doctor will talk with you about what you can do to prevent another stroke. High blood pressure, high cholesterol, and diabetes are all risk factors for stroke. If you have any of these conditions, work with your doctor to make sure they are under control. Other risk factors for stroke include being overweight, smoking, and not getting regular exercise. Going home may be hard for you and your family. The more you can try to do for yourself, the better. Remember to take each day one at a time. Follow-up care is a key part of your treatment and safety. Be sure to make and go to all appointments, and call your doctor if you are having problems. It's also a good idea to know your test results and keep a list of the medicines you take. How can you care for yourself at home?     · Enter a stroke rehabilitation (rehab) program, if your doctor recommends it. Physical, speech, and occupational therapies can help you manage bathing, dressing, eating, and other basics of daily living.      · Do not drive until your doctor says it is okay.      · It is normal to feel sad or depressed after a stroke. If these feelings last, talk to your doctor.   Krystin Monzon · If you are having problems with urine leakage, go to the bathroom at regular times, including when you first wake up and at bedtime.  Also, limit fluids after dinner.      · If you are constipated, drink plenty of fluids, enough so that your urine is light yellow or clear like water. If you have kidney, heart, or liver disease and have to limit fluids, talk with your doctor before you increase the amount of fluids you drink. Set up a regular time for using the toilet. If you continue to have constipation, your doctor may suggest using a bulking agent, such as Metamucil, or a stool softener, laxative, or enema. Medicines     · Take your medicines exactly as prescribed. Call your doctor if you think you are having a problem with your medicine. You may be taking several medicines. ACE (angiotensin-converting enzyme) inhibitors, angiotensin II receptor blockers (ARBs), beta-blockers, diuretics (water pills), and calcium channel blockers control your blood pressure. Statins help lower cholesterol. Your doctor may also prescribe medicines for depression, pain, sleep problems, anxiety, or agitation.      · If your doctor has given you a blood thinner to prevent another stroke, be sure you get instructions about how to take your medicine safely. Blood thinners can cause serious bleeding problems.      · Do not take any over-the-counter medicines or herbal products without talking to your doctor first.      · If you take birth control pills or hormone therapy, talk to your doctor about whether they are right for you.    For family members and caregivers     · Make the home safe. Set up a room so that your loved one does not have to climb stairs. Be sure the bathroom is on the same floor. Move throw rugs and furniture that could cause falls. Make sure that the lighting is good. Put grab bars and seats in tubs and showers.      · Find out what your loved one can do and what he or she needs help with. Try not to do things for your loved one that your loved one can do on his or her own. Help him or her learn and practice new skills.      · Visit and talk with your loved one often.  Try doing activities together that you both enjoy, such as playing cards or board games. Keep in touch with your loved one's friends as much as you can. Encourage them to visit.      · Take care of yourself. Do not try to do everything yourself. Ask other family members to help. Eat well, get enough rest, and take time to do things that you enjoy. Keep up with your own doctor visits, and make sure to take your medicines regularly. Get out of the house as much as you can. Join a local support group. Find out if you qualify for home health care visits to help with rehab or for adult day care. When should you call for help? Call 911 anytime you think you may need emergency care. For example, call if:     · You have signs of another stroke. These may include:  ¨ Sudden numbness, tingling, weakness, or loss of movement in your face, arm, or leg, especially on only one side of your body. ¨ Sudden vision changes. ¨ Sudden trouble speaking. ¨ Sudden confusion or trouble understanding simple statements. ¨ Sudden problems with walking or balance. ¨ A sudden, severe headache that is different from past headaches. Call 911 even if these symptoms go away in a few minutes.    Call your doctor now or seek immediate medical care if:     · You have new symptoms that may be related to your stroke, such as falls or trouble swallowing.    Watch closely for changes in your health, and be sure to contact your doctor if you have any problems. Where can you learn more? Go to http://joby-carter.info/. Enter F493 in the search box to learn more about \"Stroke: Care Instructions. \"  Current as of: November 21, 2017  Content Version: 11.7  © 2360-5416 [employer]. Care instructions adapted under license by [employer] (which disclaims liability or warranty for this information).  If you have questions about a medical condition or this instruction, always ask your healthcare professional. [place] [address] any warranty or liability for your use of this information. Discharge Instructions       PATIENT ID: Sourav Gamboa  MRN: [id number]   YOB: 1980    DATE OF ADMISSION: 9/29/2018  3:49 PM    DATE OF DISCHARGE: 10/1/2018    PRIMARY CARE PROVIDER: Deborah Marie MD     ATTENDING PHYSICIAN: Shawn Lee MD  DISCHARGING PROVIDER: Shawn Lee MD    To contact this individual call [phone] and ask the  to page. If unavailable ask to be transferred the Adult Hospitalist Department. DISCHARGE DIAGNOSES and ADDITIONAL CARE RECOMMENDATIONS:   Acute stroke  -You are started on aspirin 81 mg to be taken daily and cholesterol mediation called Lipitor nightly. Follow with your primary doctor. You need blood work in 6 weeks to check for liver enzymes. the two major side effects of the  Lipitor involve the muscle, joints and liver. If you experience severe muscle pain, weakness,stop the Lipitor and talk with your primary doctor. Uncontrolled hypertension:You are currently on amlodipine and hydrochlorothiazide for hypertension, we strongly advise you take it regularly. Lisinopril was stopped as you had swelling of lips. DIET: Cardiac Diet    ACTIVITY: Activity as tolerated    WOUND CARE: NA    EQUIPMENT needed: NA    CONSULTATIONS: IP CONSULT TO HOSPITALIST  IP CONSULT TO NEUROLOGY  IP CONSULT TO NEUROLOGY    PROCEDURES/SURGERIES: * No surgery found *    PENDING TEST RESULTS:   At the time of discharge the following test results are still pending:echocardiogram.    FOLLOW UP APPOINTMENTS:   Follow-up Information     Follow up With Details Comments Contact Info    Deborah Marie MD In 1 week post hospitalization follow up within one week recommended. [address]  [phone]                   DISCHARGE MEDICATIONS:   See Medication Reconciliation Form    · It is important that you take the medication exactly as they are prescribed.    · Keep your medication in the bottles provided by the pharmacist and keep a list of the medication names, dosages, and times to be taken in your wallet. · Do not take other medications without consulting your doctor. NOTIFY YOUR PHYSICIAN FOR ANY OF THE FOLLOWING:   Fever over 101 degrees for 24 hours. Chest pain, shortness of breath, fever, chills, nausea, vomiting, diarrhea, change in mentation, falling, weakness, bleeding. Severe pain or pain not relieved by medications. Or, any other signs or symptoms that you may have questions about. DISPOSITION:  x  Home With:   OT  PT  HH  RN       SNF/Inpatient Rehab/LTAC    Independent/assisted living    Hospice    Other:           Signed:    Zee Allen MD  10/1/2018  11:25 AM

## 2018-10-02 NOTE — PROGRESS NOTES
Problem: Falls - Risk of 
Goal: *Absence of Falls Document Kranthi Helton Fall Risk and appropriate interventions in the flowsheet. Outcome: Progressing Towards Goal 
Fall Risk Interventions: 
Mobility Interventions: Communicate number of staff needed for ambulation/transfer, PT Consult for mobility concerns, PT Consult for assist device competence Medication Interventions: Patient to call before getting OOB, Teach patient to arise slowly Elimination Interventions: Call light in reach, Toileting schedule/hourly rounds

## 2018-10-03 ENCOUNTER — HOME CARE VISIT (OUTPATIENT)
Dept: SCHEDULING | Facility: HOME HEALTH | Age: 38
End: 2018-10-03
Payer: COMMERCIAL

## 2018-10-03 VITALS
TEMPERATURE: 98 F | OXYGEN SATURATION: 98 % | SYSTOLIC BLOOD PRESSURE: 160 MMHG | RESPIRATION RATE: 12 BRPM | HEART RATE: 75 BPM | DIASTOLIC BLOOD PRESSURE: 80 MMHG

## 2018-10-03 PROCEDURE — 400013 HH SOC

## 2018-10-03 PROCEDURE — G0151 HHCP-SERV OF PT,EA 15 MIN: HCPCS

## 2018-10-03 NOTE — DISCHARGE SUMMARY
Discharge Summary       PATIENT ID: Jennifer Bernard  MRN: [id number]   YOB: 1980    DATE OF ADMISSION: 9/29/2018  3:49 PM    DATE OF DISCHARGE: 10/2/2018  PRIMARY CARE PROVIDER: Angy Winn MD     ATTENDING PHYSICIAN: Wild Quiroga MD    DISCHARGING PROVIDER: Wild Quiroga MD    To contact this individual call [phone] and ask the  to page. If unavailable ask to be transferred the Adult Hospitalist Department. CONSULTATIONS: IP CONSULT TO NEUROLOGY  IP CONSULT TO NEUROLOGY    PROCEDURES/SURGERIES: * No surgery found *    ADMITTING DIAGNOSES & HOSPITAL COURSE:   Freda Varner a 41 y. o. male with past medical history of hypertension who was brought to the ED by EMS with headache and right sided weakness      Acute left thalamic lacunar stroke  -due to uncontrolled HTN. -Head CT negative,CTA w/o evidence of large vessel occlusion, hemodynamically significant stenosis, or intracranial aneurysm. -MRI,Acute left thalamic lacunar infarct.  -on aspirin,statin. LDL 55. A1c normal.  -Neurology evaluated. -TTE unremarkable.     Accelerated hypertension,not taking medications:  -Educated on diet,low salt,exercise,complaice with medication and follow up.  -Lisinopril was discontinued as he had lip swelling and numbness. -BP controlled on amlodipine and hydrochlorothiazide.     Headache,occipatl due to elevated BP  -Head CT,CTA negative. MRI,Acute left thalamic lacunar infarct. -    ?Angioedema on 10/1:  -lisinopril was stopped and he was given benadryl.  -Lip swelling has resolved. -No airway compromise. MRI brain:  FINDINGS:  BRAIN PARENCHYMA:    1. Tiny focus of hyperintensity in the left thalamus on diffusion-weighted  images without significant FLAIR signal abnormality, consistent with acute  lacunar infarction. No other foci of diffusion restriction.   2. Mild-to-moderate scattered foci of FLAIR signal abnormality in the cerebral  white matter, predominantly subcortical in location, more than usually seen in a  patient of this age. Findings likely related to intracranial small vessel  disease. Possible chronic lacunar infarct in the right posterior basal ganglia. INTRACRANIAL HEMORRHAGE: None. CSF SPACES:  Normal in size and morphology for the patient's age. BASAL CISTERNS:  Patent. MIDLINE SHIFT: None. VASCULAR SYSTEM:  Normal flow voids. PARANASAL SINUSES AND MASTOID AIR CELLS:  Clear. VISUALIZED ORBITS:  No significant abnormalities. VISUALIZED UPPER CERVICAL SPINE:  No significant abnormalities. SELLA:  No enlargement or  focal abnormality. SKULL BASE:  No significant abnormalities. CALVARIUM:  Normal.        IMPRESSION  IMPRESSION:    1. Acute left thalamic lacunar infarct. 2. Mild cerebral white matter signal abnormality, more than expected in a  patient of this chronological age. Findings likely related to chronic small  vessel ischemic change in view of patient's hypertension. PENDING TEST RESULTS:   At the time of discharge the following test results are still pending: none    CT brain:  FINDINGS:  The ventricles and sulci are normal in size, shape and configuration and  midline. There is no significant white matter disease. There is no intracranial  hemorrhage, extra-axial collection, mass, mass effect or midline shift. The  basilar cisterns are open. No acute infarct is identified. The bone windows  demonstrate no abnormalities. The visualized portions of the paranasal sinuses  and mastoid air cells are clear. Carmen Manan IMPRESSION  IMPRESSION:   1. No evidence of acute intracranial abnormality by this modality. CTA   TECHNIQUE:  Following the uneventful administration of iodinated contrast  material, axial CT angiography of the head and neck was performed. Delayed axial  images through the head were also obtained. Coronal and sagittal reconstructions  were obtained. Manual postprocessing of images was performed.  3-D  Sagittal  maximal intensity projection images were obtained. 3-D Coronal maximal  intensity projections were obtained. CT dose reduction was achieved through use  of a standardized protocol tailored for this examination and automatic exposure  control for dose modulation. Adaptive statistical iterative reconstruction  (ASIR) was utilized.     FINDINGS:     Delayed contrast-enhanced head CT:     The ventricles are midline without hydrocephalus. There is no acute intra or  extra-axial hemorrhage. The basal cisterns are clear. The paranasal sinuses are  clear.     CTA NECK:     Great vessels: Normal arch anatomy with the origins patent.     Right subclavian artery: Patent     Left subclavian artery: Patent      Right common carotid artery: Patent      Left common carotid artery: Patent      Cervical right internal carotid artery: Patent with no significant stenosis by  NASCET criteria.     Cervical left internal carotid artery: Patent with no significant stenosis by  NASCET criteria.     Right vertebral artery: Patent     Left vertebral artery: Patent     The lung apices are clear. The thyroid is homogeneous. No cervical  lymphadenopathy.     CTA HEAD:     Right cavernous internal carotid artery: Patent     Left cavernous internal carotid artery: Patent     Anterior cerebral arteries: Patent     Anterior communicating artery: Patent     Right middle cerebral artery: Patent     Left middle cerebral artery: Patent     Posterior communicating arteries: Diminutive     Posterior cerebral arteries: Patent     Basilar artery: Patent     Distal vertebral arteries: Patent     No evidence for intracranial aneurysm or hemodynamically significant stenosis.     IMPRESSION  IMPRESSION:  No evidence of large vessel occlusion, hemodynamically significant stenosis, or  intracranial aneurysm.   FOLLOW UP APPOINTMENTS:    Follow-up Information     Follow up With Details Comments [address], [place] In 1 week post hospitalization follow up within one week recommended. Efren Kc  [phone]      [facility] On 10/15/2018 New PCP appointment on Monday 10/15 @ 1:15 p.m. Please bring discharge instructions, proof of income, Photo ID, and all medications. Liana  [address] [place] [address] [address] Call on 10/4/2018 Home Health Physical Therapy. Call agency if you have not heard from them by 12:00 p.m. [address]  [address] [place] [address]  [phone]           ADDITIONAL CARE RECOMMENDATIONS: follow up with PCP. DIET: Regular Diet      ACTIVITY: Activity as tolerated    WOUND CARE: na    EQUIPMENT needed: na      DISCHARGE MEDICATIONS:  Discharge Medication List as of 10/2/2018 11:21 AM      START taking these medications    Details   aspirin 81 mg chewable tablet Take 1 Tab by mouth daily for 30 days. , No Print, Disp-30 Tab, R-0      atorvastatin (LIPITOR) 40 mg tablet Take 1 Tab by mouth nightly for 30 days. , Print, Disp-30 Tab, R-0         CONTINUE these medications which have CHANGED    Details   amLODIPine (NORVASC) 10 mg tablet Take 1 Tab by mouth daily. , Print, Disp-30 Tab, R-0      hydroCHLOROthiazide (HYDRODIURIL) 25 mg tablet Take 1 Tab by mouth daily. , Print, Disp-30 Tab, R-0         STOP taking these medications       lisinopril (PRINIVIL, ZESTRIL) 20 mg tablet Comments:   Reason for Stopping:                 NOTIFY YOUR PHYSICIAN FOR ANY OF THE FOLLOWING:   Fever over 101 degrees for 24 hours. Chest pain, shortness of breath, fever, chills, nausea, vomiting, diarrhea, change in mentation, falling, weakness, bleeding. Severe pain or pain not relieved by medications. Or, any other signs or symptoms that you may have questions about.     DISPOSITION:   X Home With:  X OT X PT  HH  RN       Long term SNF/Inpatient Rehab    Independent/assisted living    Hospice    Other:       PATIENT CONDITION AT DISCHARGE: Functional status    Poor     Deconditioned    X Independent      Cognition   X  Lucid     Forgetful     Dementia      Catheters/lines (plus indication)    [place]     PICC     PEG    X None      Code status    X Full code     DNR      PHYSICAL EXAMINATION AT DISCHARGE:   Gen:  Well-developed, well-nourished, in no acute distress  HEENT:   PERRL, EOMI,anicteric, no pallor,hearing intact to voice, moist mucous membranes,sinus non tender. Lips welling has resolved. Resp:  No accessory muscle use, clear breath sounds without wheezes rales or rhonchi  Card:  No murmurs, normal S1, S2 without thrills, bruits or peripheral edema  Abd:  Soft, non-tender, non-distended, normoactive bowel sounds are present  Skin:  No rashes or ulcers, skin turgor is good  Neuro: alert and oriented X 3, has decreased sensation on the right thigh compared to the left,muscle strength 5/5 UE and LE.      CHRONIC MEDICAL DIAGNOSES:  Problem List as of 10/2/2018  Date Reviewed: 9/29/2018          Codes Class Noted - Resolved    Acute CVA (cerebrovascular accident) [facility]) ICD-10-CM: I63.9  ICD-9-CM: 434.91  9/30/2018 - Present        Vertebrobasilar artery syndrome ICD-10-CM: G45.0  ICD-9-CM: 435.3  9/29/2018 - Present        * (Principal)Hypertension ICD-10-CM: I10  ICD-9-CM: 401.9  9/29/2018 - Present        TIA (transient ischemic attack) ICD-10-CM: G45.9  ICD-9-CM: 435.9  9/29/2018 - Present        Hypertriglyceridemia ICD-10-CM: E78.1  ICD-9-CM: 272.1  2/2/2015 - Present        Hematuria--health dept testing April 2014. ICD-10-CM: R31.9  ICD-9-CM: 599.70  1/12/2015 - Present        Well adult exam ICD-10-CM: Z00.00  ICD-9-CM: V70.0  6/16/2014 - Present        Visual problems--seen by ophth prior to June 2014 appt. ICD-10-CM: H54.7  ICD-9-CM: V41.0  6/16/2014 - Present    Overview Signed 6/16/2014  2:35 PM by Deborah Marie MD     Notes referred to eye specialist--glasses per pt report not able to help.              LTBI (latent tuberculosis infection) ICD-10-CM: R76.11  ICD-9-CM: 795.51  6/16/2014 - Present    Overview Signed 6/16/2014  2:35 PM by Rosetta Kimbrough MD     Per pt June 2014 visit--Health Dept to deliver/start meds--has not started yet.                    Greater than 40 minutes were spent with the patient on counseling and coordination of care    Signed:   Elvis Elder MD  10/3/2018  6:19 AM

## 2018-10-05 ENCOUNTER — HOME CARE VISIT (OUTPATIENT)
Dept: SCHEDULING | Facility: HOME HEALTH | Age: 38
End: 2018-10-05
Payer: COMMERCIAL

## 2018-10-05 ENCOUNTER — OFFICE VISIT (OUTPATIENT)
Dept: INTERNAL MEDICINE CLINIC | Age: 38
End: 2018-10-05

## 2018-10-05 VITALS
WEIGHT: 164.13 LBS | RESPIRATION RATE: 16 BRPM | HEART RATE: 83 BPM | OXYGEN SATURATION: 99 % | TEMPERATURE: 98.5 F | SYSTOLIC BLOOD PRESSURE: 152 MMHG | BODY MASS INDEX: 29.08 KG/M2 | HEIGHT: 63 IN | DIASTOLIC BLOOD PRESSURE: 84 MMHG

## 2018-10-05 DIAGNOSIS — I10 HYPERTENSION, UNSPECIFIED TYPE: ICD-10-CM

## 2018-10-05 DIAGNOSIS — I63.9 ACUTE CVA (CEREBROVASCULAR ACCIDENT) (HCC): Primary | ICD-10-CM

## 2018-10-05 DIAGNOSIS — I51.7 DILATED VENTRICLE: ICD-10-CM

## 2018-10-05 DIAGNOSIS — Z02.89 ENCOUNTER FOR COMPLETION OF FORM WITH PATIENT: ICD-10-CM

## 2018-10-05 PROCEDURE — G0151 HHCP-SERV OF PT,EA 15 MIN: HCPCS

## 2018-10-05 NOTE — PROGRESS NOTES
History of Present Illness:  
Kacey Leonard is a 41 y.o. male here for evaluation: Chief Complaint Patient presents with  Transitions Of Care Patient admitted to [facility] 9/29/18-10/2/18, Stroke. Patient reports feeling numb all over and swelling and itching to LLE. Reports feeling dizzy and weak. Here for hospital follow-up. Reviewed hospital records, including MRI report, labs, echo (dilated LV), neuro note, discharge summary. Speech therapy eval normal with no symptoms per pt. Reviewed with pt and sister at visit. He reports fall at work, was dizzy that day, and had numbness of right leg and eventually right sided numbness. He then felt dizzy and fell after that. He was aware he was hospitalized 4 days. He is aware of diagnoses. He has ongoing home PT. Reviewed form with pt at visit. Prior to Admission medications Medication Sig Start Date End Date Taking? Authorizing Provider  
amLODIPine (NORVASC) 10 mg tablet Take 1 Tab by mouth daily. 10/3/18  Yes Leo Rollins MD  
hydroCHLOROthiazide (HYDRODIURIL) 25 mg tablet Take 1 Tab by mouth daily. 10/3/18  Yes Leo Rollins MD  
aspirin 81 mg chewable tablet Take 1 Tab by mouth daily for 30 days. 10/2/18 11/1/18 Yes Liu Julian MD  
atorvastatin (LIPITOR) 40 mg tablet Take 1 Tab by mouth nightly for 30 days. 10/1/18 10/31/18 Yes Liu Julian MD  
  
 
ROS Vitals:  
 10/05/18 1039 BP: 152/84 Pulse: 83 Resp: 16 Temp: 98.5 °F (36.9 °C) TempSrc: Oral  
SpO2: 99% Weight: 164 lb 2 oz (74.4 kg) Height: 5' 3\" (1.6 m) PainSc:   5 PainLoc: Leg Body mass index is 29.07 kg/(m^2). Physical Exam:  
 
Physical Exam  
Constitutional: He appears well-developed and well-nourished. No distress. HENT:  
Head: Normocephalic and atraumatic. Eyes: Conjunctivae are normal. Right eye exhibits no discharge. Left eye exhibits no discharge. No scleral icterus. Neck: Normal range of motion. Neck supple. Cardiovascular: Normal rate, regular rhythm, normal heart sounds and intact distal pulses. Exam reveals no gallop and no friction rub. No murmur heard. Pulmonary/Chest: Effort normal and breath sounds normal. No respiratory distress. He has no wheezes. He has no rales. Abdominal: Soft. Bowel sounds are normal. He exhibits no distension. There is no tenderness. Musculoskeletal: He exhibits no edema or tenderness. Neurological: He is alert. He exhibits normal muscle tone. Coordination normal.  
RLE weakness and right thigh numbness. Notes bilat facial numbness. Ambulating with cane right. Skin: Skin is warm. No rash noted. He is not diaphoretic. No erythema. No pallor. Psychiatric: He has a normal mood and affect. His behavior is normal. Judgment and thought content normal.  
 
 
Assessment and Plan: ICD-10-CM ICD-9-CM 1. Acute CVA (cerebrovascular accident) ([facility] [address] I63.9 434.91 REFERRAL TO NEUROLOGY 2. Hypertension, unspecified type I10 401.9 REFERRAL TO CARDIOLOGY 3. Dilated ventricle--LV on echo with Stroke Oct 2018. I51.7 429.3 REFERRAL TO CARDIOLOGY 4. Encounter for completion of form with patient Z02.89 V68.89 1. Follow-up with neurology reviewed due to residual symptoms for follow-up and mgt. 2,3:  Finding on echo reviewed--cardiology to eval for mgt in light of CVS and Neuro history. 4.  FMLA short-term disabilty and work form completed at visit. Copy scanned after visit and faxed. Original to pt. Follow-up Disposition: 
Return in about 3 weeks (around 10/26/2018) for Blood Pressure follow-up. 
lab results and schedule of future lab studies reviewed with patient 
reviewed medications and side effects in detail 
radiology results and schedule of future radiology studies reviewed with patient For additional documentation of information and/or recommendations discussed this visit, please see notes in instructions. Plan and evaluation (above) reviewed with pt at visit Patient voiced understanding of plan and provided with time to ask/review questions. After Visit Summary (AVS) provided to pt after visit with additional instructions as needed/reviewed. Note:  Had influenza hospitalization 10-2-18. Reviewed pt questions about co-pays, cost of care, options for un-insured pts Provided with copy labs, discharge summary and echo copy for eval with [facility]. Greater than 50% of the time in this 45min visit was spent by me in face-to-face counseling and coordination of care--reviewing findings, and mgt above.

## 2018-10-05 NOTE — PROGRESS NOTES
RM 16 Patient fasting at this time. Chief Complaint Patient presents with  Transitions Of Care Patient admitted to [facility] 9/29/18-10/2/18, Stroke. Patient reports feeling numb all over and swelling and itching to LLE. Reports feeling dizzy and weak. 1. Have you been to the ER, urgent care clinic since your last visit? Hospitalized since your last visit? Yes Reason for visit: [facility], 9/29/18, stroke 2. Have you seen or consulted any other health care providers outside of the [facility] since your last visit? Include any pap smears or colon screening. No 
 
Health Maintenance Due Topic Date Due  Pneumococcal 19-64 Medium Risk (1 of 1 - PPSV23) 05/21/2000 PHQ over the last two weeks 10/5/2018 Little interest or pleasure in doing things Not at all Feeling down, depressed, irritable, or hopeless Several days Total Score PHQ 2 1 Learning Assessment 10/5/2018 PRIMARY LEARNER Patient BARRIERS PRIMARY LEARNER NONE PRIMARY LANGUAGE ENGLISH  
LEARNER PREFERENCE PRIMARY READING  
ANSWERED BY patient RELATIONSHIP SELF

## 2018-10-05 NOTE — MR AVS SNAPSHOT
[address] [place] [address] E Jihan Hallert [address] 
[phone] Patient: Antonio Langford MRN: [id number] SZI:1[birth year]87/[birth year]15 Visit Information Date & Time Provider Department Dept. Phone Encounter #  
 10/5/2018 10:30 AM Lolly Squires, [address] [place] [address] and Internal Medicine [phone] [id number] Follow-up Instructions Return in about 3 weeks (around 10/26/2018) for Blood Pressure follow-up. Upcoming Health Maintenance Date Due Pneumococcal 19-64 Medium Risk (1 of 1 - PPSV23) 5/21/2000 DTaP/Tdap/Td series (2 - Td) 10/27/2024 Allergies as of 10/5/2018  Review Complete On: 10/5/2018 By: Lolly Squires MD  
  
 Severity Noted Reaction Type Reactions Lisinopril Medium 10/01/2018    Angioedema Lips had tingling/numbness Current Immunizations  Reviewed on 2/16/2017 Name Date Hep B Vaccine 12/27/2013, 11/29/2013 Hep B Vaccine (Adult) 1/12/2015 Influenza Vaccine 11/12/2014, 10/27/2014, 4/10/2014 Influenza Vaccine (Quad) PF 10/2/2018 10:07 AM  
 MMR 12/27/2013, 11/29/2013 Td 10/27/2014, 11/29/2013 Tdap 4/10/2014 Not reviewed this visit You Were Diagnosed With   
  
 Codes Comments Acute CVA (cerebrovascular accident) ([facility] [address]    -  Primary ICD-10-CM: I63.9 ICD-9-CM: 434.91 Hypertension, unspecified type     ICD-10-CM: I10 
ICD-9-CM: 401.9 Dilated ventricle     ICD-10-CM: I51.7 ICD-9-CM: 429.3 Encounter for completion of form with patient     ICD-10-CM: Z02.89 ICD-9-CM: V68.89 Vitals BP Pulse Temp Resp Height(growth percentile) Weight(growth percentile) 152/84 (BP 1 Location: Left arm, BP Patient Position: Sitting) 83 98.5 °F (36.9 °C) (Oral) 16 5' 3\" (1.6 m) 164 lb 2 oz (74.4 kg) SpO2 BMI Smoking Status 99% 29.07 kg/m2 Former Smoker BMI and BSA Data  Body Mass Index Body Surface Area  
 29.07 kg/m 2 1.82 m 2  
  
  
 Preferred Pharmacy Pharmacy Name Phone [id number], [address] [phone] Your Updated Medication List  
  
   
This list is accurate as of 10/5/18 11:38 AM.  Always use your most recent med list. amLODIPine 10 mg tablet Commonly known as:  Connye Squibb Take 1 Tab by mouth daily. aspirin 81 mg chewable tablet Take 1 Tab by mouth daily for 30 days. atorvastatin 40 mg tablet Commonly known as:  LIPITOR Take 1 Tab by mouth nightly for 30 days. hydroCHLOROthiazide 25 mg tablet Commonly known as:  HYDRODIURIL Take 1 Tab by mouth daily. We Performed the Following REFERRAL TO CARDIOLOGY [LIO35 Custom] Comments:  
 Please evaluate for LV dilation, history of recent CVA Sept 2018. REFERRAL TO NEUROLOGY [RCH20 Custom] Comments:  
 Please evaluate post-stroke care for persistent facial numbness. Follow-up Instructions Return in about 3 weeks (around 10/26/2018) for Blood Pressure follow-up. To-Do List   
 10/09/2018 To Be Determined Appointment with Leta Lam at [place] [address]  
  
 10/11/2018 1:30 PM  
  Appointment with Nga Jane at [place] [address] Referral Information Referral ID Referred By Referred To  
  
 [id number] Kevin Abdullahi MD   
   [address] [place] [address] [address] [place], [address] Phone: [phone] Fax: [phone] Visits Status Start Date End Date 1 Authorized 10/5/18 10/5/19 If your referral has a status of pending review or denied, additional information will be sent to support the outcome of this decision. Referral ID Referred By Referred To  
 [id number] Manual Members, MD  
   [address] [place], [address] [place] [address] Avenue Phone: [phone] Fax: [phone] Visits Status Start Date End Date 1 New Request 10/5/18 10/5/19 If your referral has a status of pending review or denied, additional information will be sent to support the outcome of this decision. Patient Instructions 1. Please follow the following instructions to process/authorize your referral, if needed: 
 
Referrals processing Please verify with your insurance IF you need referral authorization submitted. For insurance plans which require this, please follow the following steps. FAILURE TO DO SO MAY RESULT IN INABILITY TO SEE THE SPECIALIST YOU HAVE BEEN REFERRED TO (once you are scheduled to see them). 1. Call and schedule appointment with specialist 
2. Call our clinic and leave message with provider name, and date of appointment 3. We will then submit the referral to your insurance. This process takes 2-5 business days. If you have questions about scheduling or authorizing referral, you can review with our referral coordinators Adriana Giles. or Leatha Yanez) at the . You can review with them today if available/if you have time, or you can call to review with them once you have made your referral/appointment. If you are not sure if you need referral authorizations, please review with the referral coordinators, either prior to or after you have made the appointment, as reviewed. Introducing [facility]! [facility] introduces Appetite+ patient portal. Now you can access parts of your medical record, email your doctor's office, and request medication refills online. 1. In your internet browser, go to [email or web address] 2. Click on the First Time User? Click Here link in the Sign In box. You will see the New Member Sign Up page. 3. Enter your Appetite+ Access Code exactly as it appears below. You will not need to use this code after youve completed the sign-up process.  If you do not sign up before the expiration date, you must request a new code. · CINEPASS Access Code: [email or web address] Expires: 12/28/2018  3:56 PM 
 
4. Enter the last four digits of your Social Security Number (xxxx) and Date of Birth (mm/dd/yyyy) as indicated and click Submit. You will be taken to the next sign-up page. 5. Create a CINEPASS ID. This will be your CINEPASS login ID and cannot be changed, so think of one that is secure and easy to remember. 6. Create a CINEPASS password. You can change your password at any time. 7. Enter your Password Reset Question and Answer. This can be used at a later time if you forget your password. 8. Enter your e-mail address. You will receive e-mail notification when new information is available in [address] 9. Click Sign Up. You can now view and download portions of your medical record. 10. Click the Download Summary menu link to download a portable copy of your medical information. If you have questions, please visit the Frequently Asked Questions section of the CINEPASS website. Remember, CINEPASS is NOT to be used for urgent needs. For medical emergencies, dial 911. Now available from your iPhone and Android! Please provide this summary of care documentation to your next provider. Your primary care clinician is listed as [address]. If you have any questions after today's visit, please call [phone].

## 2018-10-05 NOTE — PATIENT INSTRUCTIONS
1.  Please follow the following instructions to process/authorize your referral, if needed: 
 
Referrals processing Please verify with your insurance IF you need referral authorization submitted. For insurance plans which require this, please follow the following steps. FAILURE TO DO SO MAY RESULT IN INABILITY TO SEE THE SPECIALIST YOU HAVE BEEN REFERRED TO (once you are scheduled to see them). 1. Call and schedule appointment with specialist 
2. Call our clinic and leave message with provider name, and date of appointment 3. We will then submit the referral to your insurance. This process takes 2-5 business days. If you have questions about scheduling or authorizing referral, you can review with our referral coordinators Kristie Madrid or Husam Carroll) at the . You can review with them today if available/if you have time, or you can call to review with them once you have made your referral/appointment. If you are not sure if you need referral authorizations, please review with the referral coordinators, either prior to or after you have made the appointment, as reviewed.

## 2018-10-07 ENCOUNTER — HOME CARE VISIT (OUTPATIENT)
Dept: HOME HEALTH SERVICES | Facility: HOME HEALTH | Age: 38
End: 2018-10-07
Payer: COMMERCIAL

## 2018-10-07 VITALS
DIASTOLIC BLOOD PRESSURE: 94 MMHG | RESPIRATION RATE: 16 BRPM | TEMPERATURE: 98 F | HEART RATE: 86 BPM | OXYGEN SATURATION: 99 % | SYSTOLIC BLOOD PRESSURE: 146 MMHG

## 2018-10-08 ENCOUNTER — HOME CARE VISIT (OUTPATIENT)
Dept: SCHEDULING | Facility: HOME HEALTH | Age: 38
End: 2018-10-08
Payer: COMMERCIAL

## 2018-10-08 PROCEDURE — G0155 HHCP-SVS OF CSW,EA 15 MIN: HCPCS

## 2018-10-09 ENCOUNTER — HOME CARE VISIT (OUTPATIENT)
Dept: SCHEDULING | Facility: HOME HEALTH | Age: 38
End: 2018-10-09
Payer: COMMERCIAL

## 2018-10-09 ENCOUNTER — HOME CARE VISIT (OUTPATIENT)
Dept: HOME HEALTH SERVICES | Facility: HOME HEALTH | Age: 38
End: 2018-10-09
Payer: COMMERCIAL

## 2018-10-09 VITALS
RESPIRATION RATE: 16 BRPM | HEART RATE: 88 BPM | DIASTOLIC BLOOD PRESSURE: 85 MMHG | OXYGEN SATURATION: 99 % | TEMPERATURE: 97.4 F | SYSTOLIC BLOOD PRESSURE: 130 MMHG

## 2018-10-09 PROCEDURE — G0152 HHCP-SERV OF OT,EA 15 MIN: HCPCS

## 2018-10-10 ENCOUNTER — HOME CARE VISIT (OUTPATIENT)
Dept: SCHEDULING | Facility: HOME HEALTH | Age: 38
End: 2018-10-10
Payer: COMMERCIAL

## 2018-10-10 VITALS
RESPIRATION RATE: 16 BRPM | SYSTOLIC BLOOD PRESSURE: 135 MMHG | HEART RATE: 90 BPM | OXYGEN SATURATION: 98 % | DIASTOLIC BLOOD PRESSURE: 82 MMHG | TEMPERATURE: 99 F

## 2018-10-10 PROCEDURE — G0152 HHCP-SERV OF OT,EA 15 MIN: HCPCS

## 2018-10-10 PROCEDURE — G0151 HHCP-SERV OF PT,EA 15 MIN: HCPCS

## 2018-10-11 VITALS
OXYGEN SATURATION: 98 % | DIASTOLIC BLOOD PRESSURE: 80 MMHG | TEMPERATURE: 98 F | HEART RATE: 80 BPM | SYSTOLIC BLOOD PRESSURE: 142 MMHG | RESPIRATION RATE: 16 BRPM

## 2018-10-12 ENCOUNTER — OFFICE VISIT (OUTPATIENT)
Dept: CARDIOLOGY CLINIC | Age: 38
End: 2018-10-12

## 2018-10-12 VITALS
BODY MASS INDEX: 29.23 KG/M2 | SYSTOLIC BLOOD PRESSURE: 130 MMHG | OXYGEN SATURATION: 99 % | WEIGHT: 165 LBS | DIASTOLIC BLOOD PRESSURE: 86 MMHG | HEIGHT: 63 IN | HEART RATE: 74 BPM | RESPIRATION RATE: 18 BRPM

## 2018-10-12 DIAGNOSIS — I51.7 LEFT VENTRICULAR HYPERTROPHY: Primary | ICD-10-CM

## 2018-10-12 DIAGNOSIS — R00.2 PALPITATIONS: ICD-10-CM

## 2018-10-12 DIAGNOSIS — I10 ESSENTIAL HYPERTENSION: ICD-10-CM

## 2018-10-12 DIAGNOSIS — G45.9 TIA (TRANSIENT ISCHEMIC ATTACK): ICD-10-CM

## 2018-10-12 NOTE — MR AVS SNAPSHOT
[address] [facility] [address] [facility] 
[phone] Patient: Michael Freeman MRN: [id number] BFY:[id number] Visit Information Date & Time Provider Department Dept. Phone Encounter #  
 10/12/2018  9:00 AM Turner Hernandez MD [facility] Alona Denson [phone] [id number] Follow-up Instructions Return if symptoms worsen or fail to improve. Your Appointments 10/26/2018 11:00 AM  
ROUTINE CARE with Austen Miranda MD  
[facility] [address] [place] [address]) Appt Note: BP F/U  
 [id number] [address] [facility] [address] [facility] [address]  
[address]  
  
    
 11/12/2018  2:40 PM  
New Patient with Irina Bell MD  
[facility] at [address]) Appt Note: acute CVA - 10/8/18 sb  
 [address] [address] [phone]  
  
   
 [address] [place] [address] Upcoming Health Maintenance Date Due DTaP/Tdap/Td series (2 - Td) 10/27/2024 Allergies as of 10/12/2018  Review Complete On: 10/12/2018 By: Clayton Grullon RN Severity Noted Reaction Type Reactions Lisinopril Medium 10/01/2018    Angioedema Lips had tingling/numbness Current Immunizations  Reviewed on 2/16/2017 Name Date Hep B Vaccine 12/27/2013, 11/29/2013 Hep B Vaccine (Adult) 1/12/2015 Influenza Vaccine 11/12/2014, 10/27/2014, 4/10/2014 Influenza Vaccine (Quad) PF 10/2/2018 10:07 AM  
 MMR 12/27/2013, 11/29/2013 Td 10/27/2014, 11/29/2013 Tdap 4/10/2014 Not reviewed this visit You Were Diagnosed With   
  
 Codes Comments Left ventricular hypertrophy    -  Primary ICD-10-CM: I51.7 ICD-9-CM: 429.3 Essential hypertension     ICD-10-CM: I10 
ICD-9-CM: 401.9 TIA (transient ischemic attack)     ICD-10-CM: G45.9 ICD-9-CM: 435.9 Vitals BP Pulse Resp Height(growth percentile) Weight(growth percentile) SpO2  
 130/86 (BP 1 Location: Left arm, BP Patient Position: Sitting) 74 18 5' 3\" (1.6 m) 165 lb (74.8 kg) 99% BMI Smoking Status 29.23 kg/m2 Former Smoker Vitals History BMI and BSA Data Body Mass Index Body Surface Area  
 29.23 kg/m 2 1.82 m 2 Preferred Pharmacy Pharmacy Name Phone [id number], [address] [place] [address] [phone] Your Updated Medication List  
  
   
This list is accurate as of 10/12/18  9:32 AM.  Always use your most recent med list. amLODIPine 10 mg tablet Commonly known as:  Jessie Beer Take 1 Tab by mouth daily. aspirin 81 mg chewable tablet Take 1 Tab by mouth daily for 30 days. atorvastatin 40 mg tablet Commonly known as:  LIPITOR Take 1 Tab by mouth nightly for 30 days. hydroCHLOROthiazide 25 mg tablet Commonly known as:  HYDRODIURIL Take 1 Tab by mouth daily. Follow-up Instructions Return if symptoms worsen or fail to improve. Introducing [facility]! [employer] introduces [email or web address] patient portal. Now you can access parts of your medical record, email your doctor's office, and request medication refills online. 1. In your internet browser, go to [email or web address] 2. Click on the First Time User? Click Here link in the Sign In box. You will see the New Member Sign Up page. 3. Enter your [email or web address] Access Code exactly as it appears below. You will not need to use this code after youve completed the sign-up process. If you do not sign up before the expiration date, you must request a new code. · [email or web address].ru Access Code: [email or web address] Expires: 12/28/2018  3:56 PM 
 
4.  Enter the last four digits of your Social Security Number (xxxx) and Date of Birth (mm/dd/yyyy) as indicated and click Submit. You will be taken to the next sign-up page. 5. Create a Grand Rounds ID. This will be your Grand Rounds login ID and cannot be changed, so think of one that is secure and easy to remember. 6. Create a [employer] Rounds password. You can change your password at any time. 7. Enter your Password Reset Question and Answer. This can be used at a later time if you forget your password. 8. Enter your e-mail address. You will receive e-mail notification when new information is available in [address]. 9. Click Sign Up. You can now view and download portions of your medical record. 10. Click the Download Summary menu link to download a portable copy of your medical information. If you have questions, please visit the Frequently Asked Questions section of the Grand Rounds website. Remember, [employer] Rounds is NOT to be used for urgent needs. For medical emergencies, dial 911. Now available from your iPhone and Android! Please provide this summary of care documentation to your next provider. Your primary care clinician is listed as [address]. If you have any questions after today's visit, please call [phone].

## 2018-10-12 NOTE — PROGRESS NOTES
HISTORY OF PRESENT ILLNESS  Ju Stephens is a 41 y.o. male     SUMMARY:   Problem List  Date Reviewed: 10/12/2018          Codes Class Noted    Acute CVA (cerebrovascular accident) [facility]) ICD-10-CM: I63.9  ICD-9-CM: 434.91  9/30/2018        Vertebrobasilar artery syndrome ICD-10-CM: G45.0  ICD-9-CM: 435.3  9/29/2018        Hypertension ICD-10-CM: I10  ICD-9-CM: 401.9  9/29/2018        TIA (transient ischemic attack) ICD-10-CM: G45.9  ICD-9-CM: 435.9  9/29/2018        Hypertriglyceridemia ICD-10-CM: E78.1  ICD-9-CM: 272.1  2/2/2015        Hematuria--health dept testing April 2014. ICD-10-CM: R31.9  ICD-9-CM: 599.70  1/12/2015        Well adult exam ICD-10-CM: Z00.00  ICD-9-CM: V70.0  6/16/2014        Visual problems--seen by ophth prior to June 2014 appt. ICD-10-CM: H54.7  ICD-9-CM: V41.0  6/16/2014    Overview Signed 6/16/2014  2:35 PM by Flores Wallace MD     Notes referred to eye specialist--glasses per pt report not able to help. LTBI (latent tuberculosis infection) ICD-10-CM: R76.11  ICD-9-CM: 795.51  6/16/2014    Overview Signed 6/16/2014  2:35 PM by Flores Wallace MD     Per pt June 2014 visit--Health Dept to deliver/start meds--has not started yet. Current Outpatient Prescriptions on File Prior to Visit   Medication Sig    amLODIPine (NORVASC) 10 mg tablet Take 1 Tab by mouth daily.  hydroCHLOROthiazide (HYDRODIURIL) 25 mg tablet Take 1 Tab by mouth daily.  aspirin 81 mg chewable tablet Take 1 Tab by mouth daily for 30 days.  atorvastatin (LIPITOR) 40 mg tablet Take 1 Tab by mouth nightly for 30 days. No current facility-administered medications on file prior to visit. CARDIOLOGY STUDIES TO DATE:  10/1 mild to mod lvh, otherwise normal echo        Chief Complaint   Patient presents with    Hypertension     HPI :  Mr. Shannon Bartholomew is a 41year-old referred by Dr. Alina Alonso for evaluation of abnormal echocardiogram and hypertension.   He was recently hospitalized with a stroke and has some residual right sided weakness. An echocardiogram was performed in the hospital said the LV was dilated and he had mild to moderate LVH. I have reviewed that study and he does have left ventricular hypertrophy, but his left ventricle is not dilated, so the report has been amended. He is a past smoker and is on a statin. No history of diabetes or family history of premature coronary disease. In spite of his stroke, he is quite active with no symptoms suggestive of angina or heart failure. He does feel a little palpitation or have to catch his breath when he gets anxious or frightened. CARDIAC ROS:   negative for chest pain, dyspnea, syncope, orthopnea, paroxysmal nocturnal dyspnea, exertional chest pressure/discomfort, claudication, lower extremity edema    History reviewed. No pertinent family history. Past Medical History:   Diagnosis Date    Hepatitis B immune April 2014    sAb (+) after 2 doses vaccine. cAb and sAg negative. Health Dept testing.  Immunity status testing April 2014    MMR and VZV immune. 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 IGRA (+) April 2014. CXR negative. Pt did not start therapy until Jan 2015. Pt report CXR repeated prior to 9mo INH started. INH delivered at Health Dept.  Stroke [facility])     Visual problems--seen by ophth prior to June 2014 appt. 6/16/2014    Notes referred to eye specialist--glasses per pt report not able to help. GENERAL ROS:  A comprehensive review of systems was negative except for that written in the HPI.     Visit Vitals    /86 (BP 1 Location: Left arm, BP Patient Position: Sitting)    Pulse 74    Resp 18    Ht 5' 3\" (1.6 m)    Wt 165 lb (74.8 kg)    SpO2 99%    BMI 29.23 kg/m2       Wt Readings from Last 3 Encounters:   10/12/18 165 lb (74.8 kg)   10/05/18 164 lb 2 oz (74.4 kg)   10/02/18 168 lb 3.4 oz (76.3 kg)            BP Readings from Last 3 Encounters:   10/12/18 130/86   10/10/18 135/82   10/10/18 142/80       PHYSICAL EXAM  General appearance: alert, cooperative, no distress, appears stated age  Neurologic: Alert and oriented X 3  Neck: supple, symmetrical, trachea midline, no adenopathy, no carotid bruit and no JVD  Lungs: clear to auscultation bilaterally  Heart: regular rate and rhythm, S1, S2 normal, no murmur, click, rub or gallop  Extremities: extremities normal, atraumatic, no cyanosis or edema    Lab Results   Component Value Date/Time    Cholesterol, total 144 09/29/2018 04:04 PM    Cholesterol, total 198 02/16/2017 12:00 PM    Cholesterol, total 186 01/12/2015 10:16 AM    HDL Cholesterol 27 09/29/2018 04:04 PM    HDL Cholesterol 31 (L) 02/16/2017 12:00 PM    HDL Cholesterol 30 (L) 01/12/2015 10:16 AM    LDL, calculated 55.4 09/29/2018 04:04 PM    LDL, calculated 104 (H) 02/16/2017 12:00 PM    LDL, calculated 90 01/12/2015 10:16 AM    Triglyceride 308 (H) 09/29/2018 04:04 PM    Triglyceride 313 (H) 02/16/2017 12:00 PM    Triglyceride 332 (H) 01/12/2015 10:16 AM    CHOL/HDL Ratio 5.3 (H) 09/29/2018 04:04 PM     ASSESSMENT  I reassured Mr. Ze Abel and his sister that other than thickened heart muscle from his high blood pressure, his heart was normal and that with good blood pressure control, it was entirely possible that things would normalize. He needs no further cardiac testing at this time. current treatment plan is effective, no change in therapy  lab results and schedule of future lab studies reviewed with patient  reviewed diet, exercise and weight control    Encounter Diagnoses   Name Primary?  Left ventricular hypertrophy Yes    Essential hypertension     TIA (transient ischemic attack)     Palpitations      No orders of the defined types were placed in this encounter.       Follow-up Disposition:  Return if symptoms worsen or fail to improve.     Sagar Stinson MD  10/12/2018

## 2018-10-24 ENCOUNTER — DOCUMENTATION ONLY (OUTPATIENT)
Dept: INTERNAL MEDICINE CLINIC | Age: 38
End: 2018-10-24

## 2018-10-26 ENCOUNTER — OFFICE VISIT (OUTPATIENT)
Dept: INTERNAL MEDICINE CLINIC | Age: 38
End: 2018-10-26

## 2018-10-26 VITALS
HEART RATE: 79 BPM | TEMPERATURE: 98.5 F | OXYGEN SATURATION: 98 % | HEIGHT: 63 IN | RESPIRATION RATE: 16 BRPM | DIASTOLIC BLOOD PRESSURE: 80 MMHG | SYSTOLIC BLOOD PRESSURE: 128 MMHG | WEIGHT: 168.25 LBS | BODY MASS INDEX: 29.81 KG/M2

## 2018-10-26 DIAGNOSIS — R51.9 NONINTRACTABLE HEADACHE, UNSPECIFIED CHRONICITY PATTERN, UNSPECIFIED HEADACHE TYPE: ICD-10-CM

## 2018-10-26 DIAGNOSIS — I51.7 LVH (LEFT VENTRICULAR HYPERTROPHY): ICD-10-CM

## 2018-10-26 DIAGNOSIS — I10 ESSENTIAL HYPERTENSION: Primary | ICD-10-CM

## 2018-10-26 NOTE — PATIENT INSTRUCTIONS
We have already faxed the disability form from last visit (copy provided to you today). You need to submit the employee statement/clarify what they are requesting from you at this time. Please contact your insurance or human resources contact to review this today or Monday. We will submit by fax the remaining documentation on Monday as reviewed.

## 2018-10-26 NOTE — PROGRESS NOTES
RM 18 Chief Complaint Patient presents with  Blood Pressure Check Patient reports head aches occur only in the evening for the past 2 weeks. 1. Have you been to the ER, urgent care clinic since your last visit? Hospitalized since your last visit? No 
 
2. Have you seen or consulted any other health care providers outside of the [address] [place] [address] since your last visit? Include any pap smears or colon screening. No 
 
There are no preventive care reminders to display for this patient. PHQ over the last two weeks 10/26/2018 Little interest or pleasure in doing things Not at all Feeling down, depressed, irritable, or hopeless Not at all Total Score PHQ 2 0

## 2018-10-26 NOTE — PROGRESS NOTES
History of Present Illness:  
Kacey Leonard is a 41 y.o. male here for evaluation: Chief Complaint Patient presents with  Blood Pressure Check Patient reports head aches occur only in the evening for the past 2 weeks. He notes seen by cardiology--initially difficult to confirm he had seen. Cardiology reviewed echo, indicated not chamber enlargement, but LVH. No follow-up needed with cardiology based on echo findings/review. Reviewed with pt and his friend at visit. Disability form already faxed. He had blank copy and requested completed. Provided with copy of completed form and reviewed faxe last visit. He has letter requesting \"Attending Physician Statement\". Reviewd this may mean they need notes--reviewed would complete today's, and prior note and fax as requested. Same letter today requests employee statement--states both received and need additional info--recommended they call or check with his HR contact to see what is needed. Plan fax discharge summary and note from prior visit once note completed as reviwed at visit. He notes did not follow-up with [employer] based on review prior visit. Prior to Admission medications Medication Sig Start Date End Date Taking? Authorizing Provider  
amLODIPine (NORVASC) 10 mg tablet Take 1 Tab by mouth daily. 10/3/18  Yes Vanna Kerr MD  
hydroCHLOROthiazide (HYDRODIURIL) 25 mg tablet Take 1 Tab by mouth daily. 10/3/18  Yes Vanna Kerr MD  
aspirin 81 mg chewable tablet Take 1 Tab by mouth daily for 30 days. 10/2/18 11/1/18 Yes Asim Mcgill MD  
atorvastatin (LIPITOR) 40 mg tablet Take 1 Tab by mouth nightly for 30 days. 10/1/18 10/31/18 Yes Asim Mcgill MD  
  
 
ROS Vitals:  
 10/26/18 1130 BP: 128/80 Pulse: 79 Resp: 16 Temp: 98.5 °F (36.9 °C) TempSrc: Oral  
SpO2: 98% Weight: 168 lb 4 oz (76.3 kg) Height: 5' 3\" (1.6 m) PainSc:   5 PainLoc: Leg  
  
 Body mass index is 29.8 kg/m². Physical Exam:  
 
Physical Exam  
Constitutional: He appears well-developed and well-nourished. No distress. Ambulating with cane. HENT:  
Head: Normocephalic and atraumatic. Eyes: Conjunctivae are normal. Right eye exhibits no discharge. Left eye exhibits no discharge. No scleral icterus. Cardiovascular: Normal rate, regular rhythm, normal heart sounds and intact distal pulses. Exam reveals no gallop and no friction rub. No murmur heard. Pulmonary/Chest: Effort normal and breath sounds normal. No respiratory distress. He has no wheezes. He has no rales. Abdominal: Soft. Bowel sounds are normal. He exhibits no distension. There is no tenderness. Musculoskeletal: He exhibits no edema or tenderness. Neurological: He is alert. Coordination normal.  
Skin: Skin is warm. No rash noted. He is not diaphoretic. No erythema. No pallor. Psychiatric: He has a normal mood and affect. His behavior is normal. Judgment and thought content normal.  
 
Friend helping pt coordinate paperwork at visit. Assessment and Plan: ICD-10-CM ICD-9-CM 1. Essential hypertension I10 401.9 2. Nonintractable headache, unspecified chronicity pattern, unspecified headache type R51 784.0 3. LVH (left ventricular hypertrophy) I51.7 429.3 1. Well-controlled. They note HA's in afternoon--reviewed BP monitoring to be sure not BP related. He is taking BP medication in AM. His next neuro follow-up is in afternoon--plan review BP at that visit and at follow-up here as below. 2.  Review with Neuro and monitor BP at that visit. BP Readings from Last 3 Encounters:  
10/26/18 128/80  
10/12/18 130/86  
10/10/18 135/82 3. No cardiology follow-up needed. Follow-up Disposition: 
Return in about 4 weeks (around 11/23/2018) for Blood Pressure follow-up, referral follow-up--afternoon appt. reviewed medications and side effects in detail For additional documentation of information and/or recommendations discussed this visit, please see notes in instructions. Plan and evaluation (above) reviewed with pt at visit Patient voiced understanding of plan and provided with time to ask/review questions. After Visit Summary (AVS) provided to pt after visit with additional instructions as needed/reviewed.

## 2018-10-29 NOTE — PROGRESS NOTES
Medical release form , office visit notes for 10/5/18 & 10/26/18 , and [facility] discharge summary faxed to [address] [facility] [address]  [address] [facility] # [phone]

## 2018-11-02 RX ORDER — HYDROCHLOROTHIAZIDE 25 MG/1
25 TABLET ORAL DAILY
Qty: 30 TAB | Refills: 0 | Status: CANCELLED | OUTPATIENT
Start: 2018-11-02

## 2018-11-02 RX ORDER — ATORVASTATIN CALCIUM 40 MG/1
TABLET, FILM COATED ORAL DAILY
Status: CANCELLED | OUTPATIENT
Start: 2018-11-02

## 2018-11-02 RX ORDER — AMLODIPINE BESYLATE 10 MG/1
10 TABLET ORAL DAILY
Qty: 30 TAB | Refills: 0 | Status: CANCELLED | OUTPATIENT
Start: 2018-11-02

## 2018-11-02 RX ORDER — ATORVASTATIN CALCIUM 40 MG/1
TABLET, FILM COATED ORAL DAILY
COMMUNITY
End: 2018-11-06 | Stop reason: SDUPTHER

## 2018-11-02 NOTE — TELEPHONE ENCOUNTER
Medication refill request:    Last Office Visit:  October 26, 2018  Next Office Visit:    Future Appointments   Date Time Provider Spring Fontaine   11/12/2018  2:40 PM MD KATERINA Montanez/ Desmond Choudhury   11/20/2018  3:30 PM Jaycee Soni MD [facility] [facility] verified.  yes    Patient requesting  30 day supply

## 2018-11-06 ENCOUNTER — OFFICE VISIT (OUTPATIENT)
Dept: INTERNAL MEDICINE CLINIC | Age: 38
End: 2018-11-06

## 2018-11-06 VITALS
RESPIRATION RATE: 18 BRPM | OXYGEN SATURATION: 100 % | DIASTOLIC BLOOD PRESSURE: 89 MMHG | WEIGHT: 172.13 LBS | BODY MASS INDEX: 30.5 KG/M2 | HEIGHT: 63 IN | TEMPERATURE: 98.1 F | HEART RATE: 69 BPM | SYSTOLIC BLOOD PRESSURE: 148 MMHG

## 2018-11-06 DIAGNOSIS — E78.1 HYPERTRIGLYCERIDEMIA: ICD-10-CM

## 2018-11-06 DIAGNOSIS — I10 ESSENTIAL HYPERTENSION: Primary | ICD-10-CM

## 2018-11-06 RX ORDER — AMLODIPINE BESYLATE 10 MG/1
10 TABLET ORAL DAILY
Qty: 30 TAB | Refills: 5 | Status: SHIPPED | OUTPATIENT
Start: 2018-11-06 | End: 2019-05-03 | Stop reason: SDUPTHER

## 2018-11-06 RX ORDER — GUAIFENESIN 100 MG/5ML
81 LIQUID (ML) ORAL DAILY
COMMUNITY
End: 2018-12-03 | Stop reason: SDUPTHER

## 2018-11-06 RX ORDER — HYDROCHLOROTHIAZIDE 25 MG/1
25 TABLET ORAL DAILY
Qty: 30 TAB | Refills: 5 | Status: SHIPPED | OUTPATIENT
Start: 2018-11-06 | End: 2019-05-03 | Stop reason: SDUPTHER

## 2018-11-06 RX ORDER — ATORVASTATIN CALCIUM 40 MG/1
40 TABLET, FILM COATED ORAL DAILY
Qty: 30 TAB | Refills: 5 | Status: SHIPPED | OUTPATIENT
Start: 2018-11-06 | End: 2019-05-03 | Stop reason: SDUPTHER

## 2018-11-06 NOTE — PROGRESS NOTES
RM 18 Chief Complaint Patient presents with  Medication Evaluation  
  patient would like to discuss current medications 1. Have you been to the ER, urgent care clinic since your last visit? Hospitalized since your last visit? No 
 
2. Have you seen or consulted any other health care providers outside of the [address] [place] [address] since your last visit? Include any pap smears or colon screening. No 
 
There are no preventive care reminders to display for this patient. PHQ over the last two weeks 11/6/2018 Little interest or pleasure in doing things Not at all Feeling down, depressed, irritable, or hopeless Not at all Total Score PHQ 2 0

## 2018-11-06 NOTE — PATIENT INSTRUCTIONS
Your next fasting labs will be due ~March 2019. If you prefer to do fasting labs prior to that visit, we can review at your next follow-up here. Keep the next appt as scheduled, to monitor afternon blood pressures and see if related to your headaches.

## 2018-11-06 NOTE — PROGRESS NOTES
History of Present Illness:  
Pat Montes is a 41 y.o. male here for evaluation: Chief Complaint Patient presents with  Medication Evaluation  
  patient would like to discuss current medications Reviewed medications, mgt with pt at visit. BP Readings from Last 3 Encounters:  
11/06/18 148/89  
10/26/18 128/80  
10/12/18 130/86 Pulse Readings from Last 3 Encounters:  
11/06/18 69  
10/26/18 79  
10/12/18 74 Wt Readings from Last 3 Encounters:  
11/06/18 172 lb 2 oz (78.1 kg) 10/26/18 168 lb 4 oz (76.3 kg) 10/12/18 165 lb (74.8 kg) Cholesterol, total  
Date Value Ref Range Status 09/29/2018 144 <200 MG/DL Final  
02/16/2017 198 100 - 199 mg/dL Final  
01/12/2015 186 100 - 199 mg/dL Final  
 
Triglyceride Date Value Ref Range Status 09/29/2018 308 (H) <150 MG/DL Final  
  Comment:  
  Based on NCEP-ATP III:  Triglycerides <150 mg/dL  is considered normal, 150-199 mg/dL  borderline high,  200-499 mg/dL high and  greater than or equal to 500 mg/dL very high.  
02/16/2017 313 (H) 0 - 149 mg/dL Final  
01/12/2015 332 (H) 0 - 149 mg/dL Final  
 
HDL Cholesterol Date Value Ref Range Status 09/29/2018 27 MG/DL Final  
  Comment:  
  Based on NCEP ATP III, HDL Cholesterol <40 mg/dL is considered low and >60 mg/dL is elevated. 02/16/2017 31 (L) >39 mg/dL Final  
01/12/2015 30 (L) >39 mg/dL Final  
  Comment: According to ATP-III Guidelines, HDL-C >59 mg/dL is considered a 
negative risk factor for CHD. LDL, calculated Date Value Ref Range Status 09/29/2018 55.4 0 - 100 MG/DL Final  
  Comment:  
  Based on the NCEP-ATP: LDL-C concentrations are considered  optimal <100 mg/dL, 
near optimal/above Normal 100-129 mg/dL Borderline High: 130-159, High: 160-189 mg/dL Very High: Greater than or equal to 190 mg/dL 
  
02/16/2017 104 (H) 0 - 99 mg/dL Final  
01/12/2015 90 0 - 99 mg/dL Final  
 
AST (SGOT) Date Value Ref Range Status 09/29/2018 23 15 - 37 U/L Final  
 02/16/2017 33 0 - 40 IU/L Final  
01/12/2015 29 0 - 40 IU/L Final  
 
ALT (SGPT) Date Value Ref Range Status 09/29/2018 37 12 - 78 U/L Final  
02/16/2017 53 (H) 0 - 44 IU/L Final  
01/12/2015 46 (H) 0 - 44 IU/L Final  
 
  
 
Prior to Admission medications Medication Sig Start Date End Date Taking? Authorizing Provider  
aspirin 81 mg chewable tablet Take 81 mg by mouth daily. Yes Provider, Historical  
atorvastatin (LIPITOR) 40 mg tablet Take  by mouth daily. Yes Provider, Historical  
amLODIPine (NORVASC) 10 mg tablet Take 1 Tab by mouth daily. 10/3/18  Yes Crista Burton MD  
hydroCHLOROthiazide (HYDRODIURIL) 25 mg tablet Take 1 Tab by mouth daily. 10/3/18  Yes Crista Burton MD  
  
 
ROS Vitals:  
 11/06/18 1429 BP: 148/89 Pulse: 69 Resp: 18 Temp: 98.1 °F (36.7 °C) TempSrc: Oral  
SpO2: 100% Weight: 172 lb 2 oz (78.1 kg) Height: 5' 3\" (1.6 m) PainSc:   5 PainLoc: Leg Body mass index is 30.49 kg/m². Physical Exam:  
 
Physical Exam  
Constitutional: He appears well-developed and well-nourished. No distress. Not ambulating with crutch/cane today. HENT:  
Head: Normocephalic and atraumatic. Eyes: Conjunctivae are normal. Right eye exhibits no discharge. Left eye exhibits no discharge. No scleral icterus. Cardiovascular: Normal rate, regular rhythm, normal heart sounds and intact distal pulses. Exam reveals no gallop and no friction rub. No murmur heard. Pulmonary/Chest: Effort normal and breath sounds normal. No respiratory distress. He has no wheezes. He has no rales. Abdominal: Soft. Bowel sounds are normal. He exhibits no distension. There is no tenderness. Musculoskeletal: He exhibits no edema or tenderness. Neurological: He is alert. He exhibits normal muscle tone. Coordination normal.  
Skin: Skin is warm. No rash noted. He is not diaphoretic. No erythema. No pallor. Psychiatric: He has a normal mood and affect.  His behavior is normal. Judgment and thought content normal.  
 
 
Assessment and Plan: ICD-10-CM ICD-9-CM 1. Essential hypertension I10 401.9 2. Hypertriglyceridemia E78.1 272.1 1. Due to HA with current meds, plan monitor with afternoon appt as scheduled. Monitor BP on current meds at this time. Follow-up Disposition: 
Return in about 2 weeks (around 11/20/2018) for Blood Pressure follow-up--afternoon appt as scheduled. lab results and schedule of future lab studies reviewed with patient 
reviewed medications and side effects in detail For additional documentation of information and/or recommendations discussed this visit, please see notes in instructions. Plan and evaluation (above) reviewed with pt at visit Patient voiced understanding of plan and provided with time to ask/review questions. After Visit Summary (AVS) provided to pt after visit with additional instructions as needed/reviewed.

## 2018-11-12 ENCOUNTER — OFFICE VISIT (OUTPATIENT)
Dept: NEUROLOGY | Age: 38
End: 2018-11-12

## 2018-11-12 VITALS
WEIGHT: 171.4 LBS | OXYGEN SATURATION: 98 % | SYSTOLIC BLOOD PRESSURE: 140 MMHG | BODY MASS INDEX: 30.37 KG/M2 | HEIGHT: 63 IN | HEART RATE: 82 BPM | DIASTOLIC BLOOD PRESSURE: 90 MMHG | RESPIRATION RATE: 18 BRPM

## 2018-11-12 DIAGNOSIS — R51.9 NONINTRACTABLE EPISODIC HEADACHE, UNSPECIFIED HEADACHE TYPE: ICD-10-CM

## 2018-11-12 DIAGNOSIS — M54.2 CERVICALGIA: ICD-10-CM

## 2018-11-12 DIAGNOSIS — I63.9 ACUTE CVA (CEREBROVASCULAR ACCIDENT) (HCC): Primary | ICD-10-CM

## 2018-11-12 NOTE — PATIENT INSTRUCTIONS
A Healthy Lifestyle: Care Instructions Your Care Instructions A healthy lifestyle can help you feel good, stay at a healthy weight, and have plenty of energy for both work and play. A healthy lifestyle is something you can share with your whole family. A healthy lifestyle also can lower your risk for serious health problems, such as high blood pressure, heart disease, and diabetes. You can follow a few steps listed below to improve your health and the health of your family. Follow-up care is a key part of your treatment and safety. Be sure to make and go to all appointments, and call your doctor if you are having problems. It's also a good idea to know your test results and keep a list of the medicines you take. How can you care for yourself at home? · Do not eat too much sugar, fat, or fast foods. You can still have dessert and treats now and then. The goal is moderation. · Start small to improve your eating habits. Pay attention to portion sizes, drink less juice and soda pop, and eat more fruits and vegetables. ? Eat a healthy amount of food. A 3-ounce serving of meat, for example, is about the size of a deck of cards. Fill the rest of your plate with vegetables and whole grains. ? Limit the amount of soda and sports drinks you have every day. Drink more water when you are thirsty. ? Eat at least 5 servings of fruits and vegetables every day. It may seem like a lot, but it is not hard to reach this goal. A serving or helping is 1 piece of fruit, 1 cup of vegetables, or 2 cups of leafy, raw vegetables. Have an apple or some carrot sticks as an afternoon snack instead of a candy bar. Try to have fruits and/or vegetables at every meal. 
· Make exercise part of your daily routine. You may want to start with simple activities, such as walking, bicycling, or slow swimming. Try to be active 30 to 60 minutes every day.  You do not need to do all 30 to 60 minutes all at once. For example, you can exercise 3 times a day for 10 or 20 minutes. Moderate exercise is safe for most people, but it is always a good idea to talk to your doctor before starting an exercise program. 
· Keep moving. Jodi Shine the lawn, work in the garden, or [employer]. Take the stairs instead of the elevator at work. · If you smoke, quit. People who smoke have an increased risk for heart attack, stroke, cancer, and other lung illnesses. Quitting is hard, but there are ways to boost your chance of quitting tobacco for good. ? Use nicotine gum, patches, or lozenges. ? Ask your doctor about stop-smoking programs and medicines. ? Keep trying. In addition to reducing your risk of diseases in the future, you will notice some benefits soon after you stop using tobacco. If you have shortness of breath or asthma symptoms, they will likely get better within a few weeks after you quit. · Limit how much alcohol you drink. Moderate amounts of alcohol (up to 2 drinks a day for men, 1 drink a day for women) are okay. But drinking too much can lead to liver problems, high blood pressure, and other health problems. Family health If you have a family, there are many things you can do together to improve your health. · Eat meals together as a family as often as possible. · Eat healthy foods. This includes fruits, vegetables, lean meats and dairy, and whole grains. · Include your family in your fitness plan. Most people think of activities such as jogging or tennis as the way to fitness, but there are many ways you and your family can be more active. Anything that makes you breathe hard and gets your heart pumping is exercise. Here are some tips: 
? Walk to do errands or to take your child to school or the bus. 
? Go for a family bike ride after dinner instead of watching TV. Where can you learn more? Go to http://joby-carter.info/. Enter D793 in the search box to learn more about \"A Healthy Lifestyle: Care Instructions. \" Current as of: December 7, 2017 Content Version: 11.8 © 9685-9339 Healthwise, [employer]. Care instructions adapted under license by [employer] (which disclaims liability or warranty for this information). If you have questions about a medical condition or this instruction, always ask your healthcare professional. [place] [address] any warranty or liability for your use of this information.

## 2018-11-12 NOTE — PROGRESS NOTES
Mr. Ajay Dumas presents as a new patient status post CVA. He reports daily headaches early AM and late PM. He reports occasional numbness in bilateral lower extremities and face. Depression screening done on patient.

## 2018-11-12 NOTE — LETTER
NOTIFICATION RETURN TO WORK / SCHOOL 
 
11/12/2018 3:35 PM 
 
Mr. Marie Davis [address] [place] [address] To Whom It May Concern: 
 
Marie Davis is currently under the care of [facility]. He will return to work/school on: 11/19/2018. If there are questions or concerns please have the patient contact our office. Sincerely, Erin Chung MD

## 2018-11-12 NOTE — PROGRESS NOTES
Neurology Consult Note HISTORY PROVIDED BY: patient Chief Complaint:  
Chief Complaint Patient presents with  Neurologic Problem Subjective:  
 Cyndi Story is a 41 y.o. right handed male initially and last seen 9/30/18 while hospitalized with history of hypertension and noncompliance with therapy, presenting with a blood pressure of 181/104 with complaints of headaches, spinning, dizziness and right-sided weakness and numbness all resolved except for numbness in the right anterolateral thigh with a nonfocal exam.   MRI with a small ischemic stroke in the left thalamus. LDL 55.  HgbA1C 5.4. CTA H/N was unremarkable. Recommended maximizing medical management of his hypertension. Echocardiogram with EF 60%, no RWMA. Recommended counseling for smoking cessation. Started on ASA 81mg daily. Recommended follow up with his PCP. He returns for unexpected f/u. Pt c/o waking every morning with a HA and in the evening, does not take medications for HA. Reports sleeping well at night, he denies snoring but sleeps alone. Drinks 5-6L of water. No caffeine. Also c/o neck pain, feels tight and limits head turning. Pt reports ongoing numbness in right leg. He feels fatigued when he walks. Reports bifacial numbness all over his face. He is still taking ASA he says, but had to think for a long time and wasn't sure of the mg, believes it is 81mg. He has stopped smoking. Past Medical History:  
Diagnosis Date  Hepatitis B immune April 2014  
 sAb (+) after 2 doses vaccine. cAb and sAg negative. [facility] testing.  Immunity status testing April 2014 MMR and VZV immune. 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 IGRA (+) April 2014. CXR negative. Pt did not start therapy until Jan 2015. Pt report CXR repeated prior to 9mo INH started. IN[facility] at [facility].  [facility]2018 Hospitalized with no residual deficits by Dec 2018 visit. Dr. Toñito Frye neurologist.  
[facility] Visual problems--seen by ophth prior to June 2014 appt. 6/16/2014 Notes referred to eye specialist--glasses per pt report not able to help. History reviewed. No pertinent surgical history. Social History Socioeconomic History  Marital status:  Spouse name: Not on file  Number of children: Not on file  Years of education: Not on file  Highest education level: Not on file Social Needs  Financial resource strain: Not on file  Food insecurity - worry: Not on file  Food insecurity - inability: Not on file  Transportation needs - medical: Not on file  Transportation needs - non-medical: Not on file Occupational History  Not on file Tobacco Use  Smoking status: Former Smoker Types: Cigarettes  Smokeless tobacco: Never Used Substance and Sexual Activity  Alcohol use: No  
 Drug use: No  
 Sexual activity: Not Currently Other Topics Concern  Not on file Social History Narrative  Not on file History reviewed. No pertinent family history. Objective:  
Review of Systems Constitutional: Positive for malaise/fatigue. HENT: Negative. Eyes: Negative. Respiratory: Negative. Snoring Cardiovascular: Negative. Gastrointestinal: Negative. Genitourinary: Negative. Musculoskeletal: Positive for joint pain. Muscle weakness Skin: Negative. Neurological: Positive for dizziness and headaches. Endo/Heme/Allergies: Negative. Psychiatric/Behavioral: Negative. Allergies Allergen Reactions  Lisinopril Angioedema Lips had tingling/numbness Meds: Outpatient Medications Prior to Visit Medication Sig Dispense Refill  aspirin 81 mg chewable tablet Take 81 mg by mouth daily.  amLODIPine (NORVASC) 10 mg tablet Take 1 Tab by mouth daily. 30 Tab 5  atorvastatin (LIPITOR) 40 mg tablet Take 1 Tab by mouth daily. 30 Tab 5  hydroCHLOROthiazide (HYDRODIURIL) 25 mg tablet Take 1 Tab by mouth daily. 30 Tab 5 No facility-administered medications prior to visit. Imaging: MRI Results (most recent): 
Results from Hospital Encounter encounter on 09/29/18 MRI BRAIN WO CONT Narrative EXAM: MRI BRAIN WO CONT 
 
TECHNIQUE: Sagittal and axial T1-weighted images axial FLAIR, T2-weighted, 
diffusion weighted, gradient echo,  coronal T2 
 
IV CONTRAST:  None INDICATION:  Stroke, right-sided weakness, acute onset of right leg weakness and 
numbness on 9/29/2018, with improvement. Residual right thigh numbness. Hypertension COMPARISON:  CT and CT angiogram of 9/29/2018 FINDINGS: 
BRAIN PARENCHYMA:   
1. Tiny focus of hyperintensity in the left thalamus on diffusion-weighted 
images without significant FLAIR signal abnormality, consistent with acute 
lacunar infarction. No other foci of diffusion restriction. 2. Mild-to-moderate scattered foci of FLAIR signal abnormality in the cerebral 
white matter, predominantly subcortical in location, more than usually seen in a 
patient of this age. Findings likely related to intracranial small vessel 
disease. Possible chronic lacunar infarct in the right posterior basal ganglia. INTRACRANIAL HEMORRHAGE: None. CSF SPACES:  Normal in size and morphology for the patient's age. BASAL CISTERNS:  Patent. MIDLINE SHIFT: None. VASCULAR SYSTEM:  Normal flow voids. PARANASAL SINUSES AND MASTOID AIR CELLS:  Clear. VISUALIZED ORBITS:  No significant abnormalities. VISUALIZED UPPER CERVICAL SPINE:  No significant abnormalities. SELLA:  No enlargement or  focal abnormality. SKULL BASE:  No significant abnormalities. CALVARIUM:  Normal. 
 
  
 Impression IMPRESSION:   
1. Acute left thalamic lacunar infarct.  
2. Mild cerebral white matter signal abnormality, more than expected in a 
 patient of this chronological age. Findings likely related to chronic small 
vessel ischemic change in view of patient's hypertension. CT Results (most recent): 
  
Results from [facility] Encounter encounter on 09/29/18 CTA CODE NEURO HEAD AND NECK W CONT Narrative **PRELIMINARY REPORT** No evidence of large vessel occlusion or flow-limiting stenosis. Preliminary report was provided by Dr. Alexis Collins, the on-call radiologist, at 5:59 PM on 9/29/2018. Final report to follow. **END PRELIMINARY REPORT** CLINICAL HISTORY: Right-sided weakness EXAMINATION:  CT ANGIOGRAPHY HEAD AND NECK COMPARISON: None TECHNIQUE:  Following the uneventful administration of iodinated contrast 
material, axial CT angiography of the head and neck was performed. Delayed axial 
images through the head were also obtained. Coronal and sagittal reconstructions 
were obtained. Manual postprocessing of images was performed. 3-D  Sagittal 
maximal intensity projection images were obtained. 3-D Coronal maximal 
intensity projections were obtained. CT dose reduction was achieved through use 
of a standardized protocol tailored for this examination and automatic exposure 
control for dose modulation. Adaptive statistical iterative reconstruction 
(ASIR) was utilized. FINDINGS: 
 
Delayed contrast-enhanced head CT: The ventricles are midline without hydrocephalus. There is no acute intra or 
extra-axial hemorrhage. The basal cisterns are clear. The paranasal sinuses are 
clear. CTA NECK: 
 
Great vessels: Normal arch anatomy with the origins patent. Right subclavian artery: Patent Left subclavian artery: Patent Right common carotid artery: Patent Left common carotid artery: Patent Cervical right internal carotid artery: Patent with no significant stenosis by NASCET criteria. Cervical left internal carotid artery: Patent with no significant stenosis by NASCET criteria. Right vertebral artery: Patent Left vertebral artery: Patent The lung apices are clear. The thyroid is homogeneous. No cervical 
lymphadenopathy. CTA HEAD: 
 
Right cavernous internal carotid artery: Patent Left cavernous internal carotid artery: Patent Anterior cerebral arteries: Patent Anterior communicating artery: Patent Right middle cerebral artery: Patent Left middle cerebral artery: Patent Posterior communicating arteries: Diminutive Posterior cerebral arteries: Patent Basilar artery: Patent Distal vertebral arteries: Patent No evidence for intracranial aneurysm or hemodynamically significant stenosis. Impression IMPRESSION: 
No evidence of large vessel occlusion, hemodynamically significant stenosis, or intracranial aneurysm. Reviewed records in [employer] tab today Lab Review Results for orders placed or performed during the hospital encounter of 09/29/18 METABOLIC PANEL, COMPREHENSIVE Result Value Ref Range Sodium 142 136 - 145 mmol/L Potassium 4.1 3.5 - 5.1 mmol/L Chloride 108 97 - 108 mmol/L  
 CO2 28 21 - 32 mmol/L Anion gap 6 5 - 15 mmol/L Glucose 127 (H) 65 - 100 mg/dL BUN 8 6 - 20 MG/DL Creatinine 1.04 0.70 - 1.30 MG/DL  
 BUN/Creatinine ratio 8 (L) 12 - 20 GFR est AA >60 >60 ml/min/1.73m2 GFR est non-AA >60 >60 ml/min/1.73m2 Calcium 8.6 8.5 - 10.1 MG/DL Bilirubin, total 0.4 0.2 - 1.0 MG/DL  
 ALT (SGPT) 37 12 - 78 U/L  
 AST (SGOT) 23 15 - 37 U/L Alk. phosphatase 69 45 - 117 U/L Protein, total 7.2 6.4 - 8.2 g/dL Albumin 3.7 3.5 - 5.0 g/dL Globulin 3.5 2.0 - 4.0 g/dL A-G Ratio 1.1 1.1 - 2.2    
CBC WITH AUTOMATED DIFF Result Value Ref Range WBC 7.1 4.1 - 11.1 K/uL  
 RBC 4.41 4.10 - 5.70 M/uL  
 HGB 13.7 12.1 - 17.0 g/dL HCT 41.5 36.6 - 50.3 % MCV 94.1 80.0 - 99.0 FL  
 MCH 31.1 26.0 - 34.0 PG  
 MCHC 33.0 30.0 - 36.5 g/dL  
 RDW 12.6 11.5 - 14.5 % PLATELET 002 160 - 487 K/uL NRBC 0.0 0  WBC ABSOLUTE NRBC 0.00 0.00 - 0.01 K/uL NEUTROPHILS 65 32 - 75 % LYMPHOCYTES 23 12 - 49 % MONOCYTES 8 5 - 13 % EOSINOPHILS 3 0 - 7 % BASOPHILS 1 0 - 1 % IMMATURE GRANULOCYTES 1 (H) 0.0 - 0.5 % ABS. NEUTROPHILS 4.6 1.8 - 8.0 K/UL  
 ABS. LYMPHOCYTES 1.7 0.8 - 3.5 K/UL  
 ABS. MONOCYTES 0.5 0.0 - 1.0 K/UL  
 ABS. EOSINOPHILS 0.2 0.0 - 0.4 K/UL  
 ABS. BASOPHILS 0.1 0.0 - 0.1 K/UL  
 ABS. IMM. GRANS. 0.0 0.00 - 0.04 K/UL  
 DF AUTOMATED PROTHROMBIN TIME + INR Result Value Ref Range INR 1.0 0.9 - 1.1 Prothrombin time 10.1 9.0 - 11.1 sec PTT Result Value Ref Range aPTT 27.7 22.1 - 32.0 sec  
 aPTT, therapeutic range     58.0 - 77.0 SECS  
SAMPLES BEING HELD Result Value Ref Range SAMPLES BEING HELD 1red COMMENT Add-on orders for these samples will be processed based on acceptable specimen integrity and analyte stability, which may vary by analyte. TROPONIN I Result Value Ref Range Troponin-I, Qt. <0.05 <0.05 ng/mL CK W/ REFLX CKMB Result Value Ref Range  39 - 308 U/L  
MAGNESIUM Result Value Ref Range Magnesium 2.0 1.6 - 2.4 mg/dL HEMOGLOBIN A1C WITH EAG Result Value Ref Range Hemoglobin A1c 5.4 4.2 - 6.3 % Est. average glucose 108 mg/dL LIPID PANEL Result Value Ref Range LIPID PROFILE Cholesterol, total 144 <200 MG/DL Triglyceride 308 (H) <150 MG/DL  
 HDL Cholesterol 27 MG/DL  
 LDL, calculated 55.4 0 - 100 MG/DL VLDL, calculated 61.6 MG/DL  
 CHOL/HDL Ratio 5.3 (H) 0 - 5.0 TSH 3RD GENERATION Result Value Ref Range TSH 1.08 0.36 - 3.74 uIU/mL URINALYSIS W/MICROSCOPIC Result Value Ref Range Color YELLOW/STRAW Appearance CLEAR CLEAR Specific gravity 1.022 1.003 - 1.030    
 pH (UA) 7.0 5.0 - 8.0 Protein NEGATIVE  NEG mg/dL Glucose NEGATIVE  NEG mg/dL Ketone NEGATIVE  NEG mg/dL  Bilirubin NEGATIVE  NEG    
 Blood TRACE (A) NEG Urobilinogen 0.2 0.2 - 1.0 EU/dL Nitrites NEGATIVE  NEG Leukocyte Esterase NEGATIVE  NEG    
 WBC 0-4 0 - 4 /hpf  
 RBC 5-10 0 - 5 /hpf Epithelial cells FEW FEW /lpf Bacteria NEGATIVE  NEG /hpf Hyaline cast 0-2 0 - 5 /lpf  
GLUCOSE, POC Result Value Ref Range Glucose (POC) 142 (H) 65 - 100 mg/dL Performed by Jackie Mandujano, POC Result Value Ref Range Glucose (POC) 121 (H) 65 - 100 mg/dL Performed by Kristin Agarwal EKG, 12 LEAD, INITIAL Result Value Ref Range Ventricular Rate 73 BPM  
 Atrial Rate 73 BPM  
 P-R Interval 144 ms QRS Duration 84 ms Q-T Interval 362 ms QTC Calculation (Bezet) 398 ms Calculated P Axis 59 degrees Calculated R Axis 60 degrees Calculated T Axis 158 degrees Diagnosis Normal sinus rhythm Moderate voltage criteria for LVH, may be normal variant with repolarization  
abnormalities No previous ECGs available Confirmed by Sherrilyn Mohs, M.D., Genevieve Rodriguez ([id number]) on 9/30/2018 3:18:07 PM 
  
  
 
Exam: 
Visit Vitals /90 Pulse 82 Resp 18 Ht 5' 3\" (1.6 m) Wt 77.7 kg (171 lb 6.4 oz) SpO2 98% BMI 30.36 kg/m² General:  Alert, cooperative, no distress. Head:  Normocephalic, without obvious abnormality, atraumatic. Respiratory: 
Heart:   Non labored breathing Regular rate and rhythm, no murmurs Neck:     
Extremities: Warm, no cyanosis or edema. Pulses: 2+ radial pulses. Neurologic:  MS: Alert and oriented x 4, speech intact. Language intact. Attention and fund of knowledge appropriate. Recent and remote memory intact. Cranial Nerves: 
II: visual fields Full to confrontation II: pupils Equal, round, reactive to light II: optic disc No papilledema III,VII: ptosis none III,IV,VI: extraocular muscles  EOMI, no nystagmus or diplopia V: facial light touch sensation  normal  
VII: facial muscle function   symmetric VIII: hearing intact IX: soft palate elevation  normal  
XI: trapezius strength XI: sternocleidomastoid strength XII: tongue  Midline Motor: normal bulk and tone, no tremor Strength: 5/5 throughout, no PD Sensory:  
Coordination: FTN and HTS intact, LOREE intact Gait: normal gait Reflexes: 2+ symmetric Assessment/Plan Pt is a 41 y.o. right handed male hospitalized 9/30/18 with history of hypertension and noncompliance with therapy, presenting with a blood pressure of 181/104 with complaints of headaches, spinning, dizziness and right-sided weakness and numbness all resolved except for numbness in the right anterolateral thigh with a nonfocal exam, found to have a small ischemic stroke in the left thalamus on MRI brain. LDL 55.  HgbA1C 5.4. CTA H/N was unremarkable. He has stopped smoking and is taking ASA 81mg daily. Now with c/o waking with daily HA and neck pain limited ROM in neck. Exam remains unremarkable except for BMI 31. Recommend PT for neck pain triggering HAs.  F/u as needed for now. ICD-10-CM ICD-9-CM 1. Acute CVA (cerebrovascular accident) ([facility] I63.9 434.91   
2. Cervicalgia M54.2 723.1 REFERRAL TO PHYSICAL THERAPY 3. Nonintractable episodic headache, unspecified headache type R51 784.0 REFERRAL TO PHYSICAL THERAPY Signed: Tahmina Haney MD 
11/12/2018

## 2018-11-19 ENCOUNTER — DOCUMENTATION ONLY (OUTPATIENT)
Dept: INTERNAL MEDICINE CLINIC | Age: 38
End: 2018-11-19

## 2018-11-19 NOTE — PROGRESS NOTES
Pt dropped off [facility] Phys[facility] Statement  Form to be completed by pcp.  Would like a call back to  paperwork but would also like [facility] [phone]

## 2018-11-30 ENCOUNTER — CLINICAL SUPPORT (OUTPATIENT)
Dept: INTERNAL MEDICINE CLINIC | Age: 38
End: 2018-11-30

## 2018-11-30 VITALS
SYSTOLIC BLOOD PRESSURE: 130 MMHG | WEIGHT: 174 LBS | RESPIRATION RATE: 18 BRPM | HEIGHT: 63 IN | HEART RATE: 71 BPM | OXYGEN SATURATION: 99 % | BODY MASS INDEX: 30.83 KG/M2 | DIASTOLIC BLOOD PRESSURE: 81 MMHG

## 2018-11-30 DIAGNOSIS — I10 ESSENTIAL HYPERTENSION: Primary | ICD-10-CM

## 2018-11-30 NOTE — PROGRESS NOTES
Pt missed morning appt. Reviewed eval today with nursedelmira. Needs to return for afternoon appt for BP check due to history afternoon HA's to monitor BP control. Seen for BP check only. Work form in provider box to complete at next visit. Blood pressure 130/81, pulse 71, resp. rate 18, height 5' 3\" (1.6 m), weight 174 lb (78.9 kg), SpO2 99 %.

## 2018-11-30 NOTE — PROGRESS NOTES
Pt in for nurse visit only for BP check. Patient arrive very late for scheduled appt for today. Appt rescheduled for next week in the afternoon. Provider made aware. Pt add's at visit that he still has the R leg pain when standing for long periods of time at work. If he sits he states the pain dissipates.

## 2018-12-03 ENCOUNTER — DOCUMENTATION ONLY (OUTPATIENT)
Dept: NEUROLOGY | Age: 38
End: 2018-12-03

## 2018-12-03 ENCOUNTER — OFFICE VISIT (OUTPATIENT)
Dept: INTERNAL MEDICINE CLINIC | Age: 38
End: 2018-12-03

## 2018-12-03 VITALS
DIASTOLIC BLOOD PRESSURE: 74 MMHG | BODY MASS INDEX: 31.07 KG/M2 | WEIGHT: 175.38 LBS | HEIGHT: 63 IN | SYSTOLIC BLOOD PRESSURE: 125 MMHG | TEMPERATURE: 98.5 F | HEART RATE: 77 BPM | OXYGEN SATURATION: 98 % | RESPIRATION RATE: 16 BRPM

## 2018-12-03 DIAGNOSIS — E78.1 HYPERTRIGLYCERIDEMIA: Primary | ICD-10-CM

## 2018-12-03 DIAGNOSIS — Z86.73 HISTORY OF CVA (CEREBROVASCULAR ACCIDENT): ICD-10-CM

## 2018-12-03 DIAGNOSIS — I10 ESSENTIAL HYPERTENSION: ICD-10-CM

## 2018-12-03 RX ORDER — GUAIFENESIN 100 MG/5ML
81 LIQUID (ML) ORAL DAILY
Qty: 30 TAB | Refills: 5 | Status: SHIPPED | OUTPATIENT
Start: 2018-12-03 | End: 2019-06-13 | Stop reason: SDUPTHER

## 2018-12-03 NOTE — PROGRESS NOTES
RM 16 Chief Complaint Patient presents with  Blood Pressure Check  Referral Follow Up 1. Have you been to the ER, urgent care clinic since your last visit? Hospitalized since your last visit? No 
 
2. Have you seen or consulted any other health care providers outside of the [address] [place] [address] since your last visit? Include any pap smears or colon screening. No 
 
 
There are no preventive care reminders to display for this patient. PHQ over the last two weeks 12/3/2018 Little interest or pleasure in doing things Not at all Feeling down, depressed, irritable, or hopeless Not at all Total Score PHQ 2 0 Abuse Screening Questionnaire 12/3/2018 Do you ever feel afraid of your partner? Trinity Mock Are you in a relationship with someone who physically or mentally threatens you? Trinity Mock Is it safe for you to go home?  Yariel Oneal

## 2018-12-03 NOTE — PROGRESS NOTES
MRN:[id number]                      After Visit Summary   12/3/2018    Ruchi Lerner    MRN: [id number]           Visit Information        Provider Department      12/3/2018 8:00 AM [facility] Franklin [facility]      Your next 10 appointments already scheduled     Dec 17, 2018  8:00 AM [facility] Return with Janae Franklin ISELA   [facility] ([facility])    [address] [facility] [address] [phone]            Dec 31, 2018 11:00 AM CST   [facility] with Janae Franklin ISELA   [facility] ([facility]    [address] [facility] [address] [phone]              [facility] Information     [facility] gives you secure access to your electronic health record. If you see a primary care provider, you can also send messages to your care team and make appointments. If you have questions, please call your primary care clinic.  If you do not have a primary care provider, please call [phone] and they will assist you.        Care EveryWhere ID     This is your Care EveryWhere ID. This could be used by other organizations to access your [place] medical records  [id number]        Equal Access to Services     EVY HERNANDEZ : Francheska harpero Soheidiali, waaxda luqadaha, qaybta kaalmada adeegyada, jael cevallos. So [facility] [phone].    ATENCIÓN: Si habla español, tiene a coleman disposición servicios gratuitos de asistencia lingüística. Llame al [phone].    We comply with applicable federal civil rights laws and [place] laws. We do not discriminate on the basis of race, color, national origin, age, disability, sex, sexual orientation, or gender identity.             Blank Short Term Disability form from [facility] scanned into cc and placed in provider's bin

## 2018-12-03 NOTE — PROGRESS NOTES
History of Present Illness:  
Kim Sellers is a 41 y.o. male here for evaluation: Chief Complaint Patient presents with  Blood Pressure Check  Referral Follow Up Here for BP follow-up. He had form faxed priro to 11-20 appt, but didn't make 11/20 appt. He was late for 11/30 appt and re-scheduled. Reviewed form at visit, but realized form incomplete--only page 4 of 8 rec'd. He notes he is not aware of further paperwork needed for work/work return. He has not yet returned to work. He has completed PT and working on return to work. He had PT completed through home PT. Did not have to attend sessions once home PT completed. He is agreeable to plan and labs below. He notes no residual deficits after CVA. He had been referred to neuro provider for persistent facial numbness, but notes this has also resolved. Reviewed goals of BP meds--to maintain current good control and prevent recurrent CVA related to BP elevation. Continue current meds reviewed. Potassium monitoring on HCTZ reviewed. Labs today. Reviewed continuing ASA 81mg and Lipitor. Plan fasting labs in 3mo reviewed with pt. He has no problems continuing current meds. Requests script for ASA as reviewed. Reviewed if he planned to stop, should review with neurology prior. He is agreeable to continuing current meds with follow-up as below. Prior to Admission medications Medication Sig Start Date End Date Taking? Authorizing Provider  
aspirin 81 mg chewable tablet Take 81 mg by mouth daily. Yes Provider, Historical  
amLODIPine (NORVASC) 10 mg tablet Take 1 Tab by mouth daily. 11/6/18  Yes Elisha Hong MD  
atorvastatin (LIPITOR) 40 mg tablet Take 1 Tab by mouth daily. 11/6/18  Yes Elisha Hong MD  
hydroCHLOROthiazide (HYDRODIURIL) 25 mg tablet Take 1 Tab by mouth daily. 11/6/18  Yes MD DOROTEO Mari Vitals:  
 12/03/18 1339 BP: 125/74 Pulse: 77 Resp: 16  
 Temp: 98.5 °F (36.9 °C) TempSrc: Oral  
SpO2: 98% Weight: 175 lb 6 oz (79.5 kg) Height: 5' 3\" (1.6 m) PainSc:   0 - No pain Body mass index is 31.07 kg/m². Physical Exam:  
 
Physical Exam  
Constitutional: He appears well-developed and well-nourished. No distress. HENT:  
Head: Normocephalic and atraumatic. Eyes: Conjunctivae are normal. Right eye exhibits no discharge. Left eye exhibits no discharge. No scleral icterus. Cardiovascular: Normal rate, regular rhythm, normal heart sounds and intact distal pulses. Exam reveals no gallop and no friction rub. No murmur heard. Pulmonary/Chest: Effort normal and breath sounds normal. No respiratory distress. He has no wheezes. He has no rales. Abdominal: Soft. Bowel sounds are normal. He exhibits no distension. There is no tenderness. Musculoskeletal: He exhibits no edema or tenderness. Neurological: He is alert. He exhibits normal muscle tone. Coordination normal.  
Skin: Skin is warm. No rash noted. He is not diaphoretic. No erythema. No pallor. Psychiatric: He has a normal mood and affect. His behavior is normal. Judgment and thought content normal.  
 
 
Assessment and Plan: ICD-10-CM ICD-9-CM 1. Hypertriglyceridemia H24.4 159.1 METABOLIC PANEL, COMPREHENSIVE 2. Essential hypertension M96 024.9 METABOLIC PANEL, COMPREHENSIVE 3. History of CVA (cerebrovascular accident) Z86.73 V12.54 aspirin 81 mg chewable tablet 1,3:  LFT monitoring on statin reviewed. Plan fasting labs at follow-up. Continue ASA--sent to pharmacy as script as requested. 2.  Goal BP control. Check potassium reviewed today. current treatment plan is effective, no change in therapy 
lab results and schedule of future lab studies reviewed with patient 
reviewed medications and side effects in detail For additional documentation of information and/or recommendations discussed this visit, please see notes in instructions. Plan and evaluation (above) reviewed with pt at visit Patient voiced understanding of plan and provided with time to ask/review questions. After Visit Summary (AVS) provided to pt after visit with additional instructions as needed/reviewed.

## 2018-12-04 LAB
ALBUMIN SERPL-MCNC: 4.2 G/DL (ref 3.5–5.5)
ALBUMIN/GLOB SERPL: 1.6 {RATIO} (ref 1.2–2.2)
ALP SERPL-CCNC: 68 IU/L (ref 39–117)
ALT SERPL-CCNC: 55 IU/L (ref 0–44)
AST SERPL-CCNC: 35 IU/L (ref 0–40)
BILIRUB SERPL-MCNC: 0.6 MG/DL (ref 0–1.2)
BUN SERPL-MCNC: 9 MG/DL (ref 6–20)
BUN/CREAT SERPL: 9 (ref 9–20)
CALCIUM SERPL-MCNC: 9 MG/DL (ref 8.7–10.2)
CHLORIDE SERPL-SCNC: 103 MMOL/L (ref 96–106)
CO2 SERPL-SCNC: 25 MMOL/L (ref 20–29)
CREAT SERPL-MCNC: 0.96 MG/DL (ref 0.76–1.27)
GLOBULIN SER CALC-MCNC: 2.6 G/DL (ref 1.5–4.5)
GLUCOSE SERPL-MCNC: 108 MG/DL (ref 65–99)
POTASSIUM SERPL-SCNC: 3.8 MMOL/L (ref 3.5–5.2)
PROT SERPL-MCNC: 6.8 G/DL (ref 6–8.5)
SODIUM SERPL-SCNC: 144 MMOL/L (ref 134–144)

## 2018-12-22 ENCOUNTER — APPOINTMENT (OUTPATIENT)
Dept: CT IMAGING | Age: 38
End: 2018-12-22
Attending: EMERGENCY MEDICINE
Payer: MEDICAID

## 2018-12-22 ENCOUNTER — OFFICE VISIT (OUTPATIENT)
Dept: URGENT CARE | Age: 38
End: 2018-12-22

## 2018-12-22 ENCOUNTER — HOSPITAL ENCOUNTER (EMERGENCY)
Age: 38
Discharge: HOME | End: 2018-12-22
Attending: EMERGENCY MEDICINE
Payer: MEDICAID

## 2018-12-22 VITALS
RESPIRATION RATE: 20 BRPM | DIASTOLIC BLOOD PRESSURE: 72 MMHG | BODY MASS INDEX: 30.65 KG/M2 | TEMPERATURE: 97.4 F | OXYGEN SATURATION: 99 % | SYSTOLIC BLOOD PRESSURE: 121 MMHG | HEIGHT: 63 IN | HEART RATE: 86 BPM | WEIGHT: 173 LBS

## 2018-12-22 VITALS
HEART RATE: 73 BPM | OXYGEN SATURATION: 99 % | TEMPERATURE: 98.5 F | RESPIRATION RATE: 19 BRPM | DIASTOLIC BLOOD PRESSURE: 67 MMHG | SYSTOLIC BLOOD PRESSURE: 122 MMHG

## 2018-12-22 DIAGNOSIS — R20.2 NUMBNESS AND TINGLING: Primary | ICD-10-CM

## 2018-12-22 DIAGNOSIS — R20.0 NUMBNESS AND TINGLING: Primary | ICD-10-CM

## 2018-12-22 DIAGNOSIS — R41.0 CONFUSION AND DISORIENTATION: ICD-10-CM

## 2018-12-22 DIAGNOSIS — G47.9 SLEEP DISTURBANCE: Primary | ICD-10-CM

## 2018-12-22 LAB
ALBUMIN SERPL-MCNC: 4.2 G/DL (ref 3.5–5)
ALBUMIN/GLOB SERPL: 1.2 {RATIO} (ref 1.1–2.2)
ALP SERPL-CCNC: 79 U/L (ref 45–117)
ALT SERPL-CCNC: 88 U/L (ref 12–78)
ANION GAP SERPL CALC-SCNC: 6 MMOL/L (ref 5–15)
AST SERPL-CCNC: 42 U/L (ref 15–37)
BASOPHILS # BLD: 0 K/UL (ref 0–0.1)
BASOPHILS NFR BLD: 1 % (ref 0–1)
BILIRUB SERPL-MCNC: 0.6 MG/DL (ref 0.2–1)
BUN SERPL-MCNC: 7 MG/DL (ref 6–20)
BUN/CREAT SERPL: 7 (ref 12–20)
CALCIUM SERPL-MCNC: 9.1 MG/DL (ref 8.5–10.1)
CHLORIDE SERPL-SCNC: 105 MMOL/L (ref 97–108)
CO2 SERPL-SCNC: 30 MMOL/L (ref 21–32)
COMMENT, HOLDF: NORMAL
CREAT SERPL-MCNC: 1.01 MG/DL (ref 0.7–1.3)
DIFFERENTIAL METHOD BLD: ABNORMAL
EOSINOPHIL # BLD: 0.2 K/UL (ref 0–0.4)
EOSINOPHIL NFR BLD: 2 % (ref 0–7)
ERYTHROCYTE [DISTWIDTH] IN BLOOD BY AUTOMATED COUNT: 12.5 % (ref 11.5–14.5)
FLUAV AG NPH QL IA: NEGATIVE
FLUBV AG NOSE QL IA: NEGATIVE
GLOBULIN SER CALC-MCNC: 3.6 G/DL (ref 2–4)
GLUCOSE SERPL-MCNC: 133 MG/DL (ref 65–100)
HCT VFR BLD AUTO: 42.3 % (ref 36.6–50.3)
HGB BLD-MCNC: 14.1 G/DL (ref 12.1–17)
IMM GRANULOCYTES # BLD: 0 K/UL (ref 0–0.04)
IMM GRANULOCYTES NFR BLD AUTO: 1 % (ref 0–0.5)
LYMPHOCYTES # BLD: 2 K/UL (ref 0.8–3.5)
LYMPHOCYTES NFR BLD: 30 % (ref 12–49)
MCH RBC QN AUTO: 31.1 PG (ref 26–34)
MCHC RBC AUTO-ENTMCNC: 33.3 G/DL (ref 30–36.5)
MCV RBC AUTO: 93.2 FL (ref 80–99)
MONOCYTES # BLD: 0.6 K/UL (ref 0–1)
MONOCYTES NFR BLD: 9 % (ref 5–13)
NEUTS SEG # BLD: 3.7 K/UL (ref 1.8–8)
NEUTS SEG NFR BLD: 58 % (ref 32–75)
NRBC # BLD: 0 K/UL (ref 0–0.01)
NRBC BLD-RTO: 0 PER 100 WBC
PLATELET # BLD AUTO: 211 K/UL (ref 150–400)
PMV BLD AUTO: 12.2 FL (ref 8.9–12.9)
POTASSIUM SERPL-SCNC: 3.4 MMOL/L (ref 3.5–5.1)
PROT SERPL-MCNC: 7.8 G/DL (ref 6.4–8.2)
RBC # BLD AUTO: 4.54 M/UL (ref 4.1–5.7)
SAMPLES BEING HELD,HOLD: NORMAL
SODIUM SERPL-SCNC: 141 MMOL/L (ref 136–145)
TROPONIN I SERPL-MCNC: <0.05 NG/ML
WBC # BLD AUTO: 6.5 K/UL (ref 4.1–11.1)

## 2018-12-22 PROCEDURE — 87804 INFLUENZA ASSAY W/OPTIC: CPT

## 2018-12-22 PROCEDURE — 84484 ASSAY OF TROPONIN QUANT: CPT

## 2018-12-22 PROCEDURE — 85025 COMPLETE CBC W/AUTO DIFF WBC: CPT

## 2018-12-22 PROCEDURE — 93005 ELECTROCARDIOGRAM TRACING: CPT

## 2018-12-22 PROCEDURE — 70450 CT HEAD/BRAIN W/O DYE: CPT

## 2018-12-22 PROCEDURE — 99285 EMERGENCY DEPT VISIT HI MDM: CPT

## 2018-12-22 PROCEDURE — 80053 COMPREHEN METABOLIC PANEL: CPT

## 2018-12-22 NOTE — PROGRESS NOTES
Pt describes some all over numbness for 2-3 days and waking with confusion nightly for about 3 day. No confusion at present, No HA no weakness no vision changes       The history is provided by the patient. Numbness   This is a new problem. The current episode started more than 2 days ago. Episode frequency: waxes and wanes with some sx present all the time. The problem has not changed since onset. Associated symptoms include shortness of breath (sometimes he feels SOB with confusion at night-none now). Pertinent negatives include no chest pain, no abdominal pain and no headaches. Nothing aggravates the symptoms. Nothing relieves the symptoms. He has tried nothing for the symptoms. Past Medical History:   Diagnosis Date    Hepatitis B immune April 2014    sAb (+) after 2 doses vaccine. cAb and sAg negative. [facility] testing.  Immunity status testing April 2014    MMR and VZV immune. 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 IGRA (+) April 2014. CXR negative. Pt did not start therapy until Jan 2015. Pt report CXR repeated prior to 9mo INH started. [facility].  Stroke [facility]) 09/29/2018    Hospitalized with no residual deficits by Dec 2018 visit. Dr. Tavo Mera neurologist.   [facility] Visual problems--seen by ophth prior to June 2014 appt. 6/16/2014    Notes referred to eye specialist--glasses per pt report not able to help. History reviewed. No pertinent surgical history. History reviewed. No pertinent family history.      Social History     Socioeconomic History    Marital status:      Spouse name: Not on file    Number of children: Not on file    Years of education: Not on file    Highest education level: Not on file   Social Needs    Financial resource strain: Not on file    Food insecurity - worry: Not on file    Food insecurity - inability: Not on file   [facility] Transportation needs - medical: Not on file   [employer] needs - non-medical: Not on file   Occupational History    Not on file   Tobacco Use    Smoking status: Former Smoker     Types: Cigarettes    Smokeless tobacco: Never Used   Substance and Sexual Activity    Alcohol use: No    Drug use: No    Sexual activity: Not Currently   Other Topics Concern    Not on file   Social History Narrative    Not on file                ALLERGIES: Lisinopril    Review of Systems   Constitutional: Negative. Eyes: Negative. Respiratory: Positive for shortness of breath (sometimes he feels SOB with confusion at night-none now). Negative for apnea and cough. Cardiovascular: Negative for chest pain. Gastrointestinal: Negative for abdominal pain. Musculoskeletal: Negative. Neurological: Positive for numbness. Negative for dizziness, tremors, seizures, light-headedness and headaches. Vitals:    12/22/18 1625   BP: (!) 169/99   Pulse: 86   Resp: 20   Temp: 97.4 °F (36.3 °C)   SpO2: 99%   Weight: 173 lb (78.5 kg)   Height: 5' 3\" (1.6 m)       Physical Exam   Constitutional: He is oriented to person, place, and time. He appears well-developed and well-nourished. No distress. HENT:   Head: Normocephalic and atraumatic. Right Ear: External ear normal.   Left Ear: External ear normal.   Mouth/Throat: Oropharynx is clear and moist. No oropharyngeal exudate. Eyes: Conjunctivae and EOM are normal. Pupils are equal, round, and reactive to light. Right eye exhibits no discharge. Left eye exhibits no discharge. No scleral icterus. Neck: Normal range of motion. Neck supple. No JVD present. No tracheal deviation present. No thyromegaly present. Cardiovascular: Normal rate, regular rhythm, normal heart sounds and intact distal pulses. No murmur heard. Pulmonary/Chest: Effort normal and breath sounds normal. No respiratory distress. He has no wheezes. He has no rales.    Musculoskeletal: Normal range of motion. He exhibits no edema or tenderness. Lymphadenopathy:     He has no cervical adenopathy. Neurological: He is alert and oriented to person, place, and time. He has normal reflexes. He displays normal reflexes. No cranial nerve deficit. Coordination normal.   Ambulatory and no focal defecits   Skin: Skin is warm. No rash noted. No erythema. Psychiatric: He has a normal mood and affect. His behavior is normal.   Nursing note and vitals reviewed. MDM       ICD-10-CM ICD-9-CM    1. Numbness and tingling R20.0 782.0     R20.2     2. Confusion and disorientation F99 300.9     after falling asleep for about 3 nights, none now     No orders of the defined types were placed in this encounter. The patients condition was discussed with the patient and they understand. The pt is to go to the ER. The patient is to take medications as prescribed.                  Procedures

## 2018-12-22 NOTE — PROGRESS NOTES
After being seen by provider pt advised to be evaluated in the emergency department. Report called to [id number] [facility] ED by Dr Shankar Garcia.

## 2018-12-22 NOTE — PATIENT INSTRUCTIONS
I would like you to go to [facility] for further evaluation especially in light of your recent stroke

## 2018-12-23 LAB
ATRIAL RATE: 71 BPM
CALCULATED P AXIS, ECG09: 60 DEGREES
CALCULATED R AXIS, ECG10: 59 DEGREES
CALCULATED T AXIS, ECG11: 0 DEGREES
DIAGNOSIS, 93000: NORMAL
P-R INTERVAL, ECG05: 148 MS
Q-T INTERVAL, ECG07: 368 MS
QRS DURATION, ECG06: 86 MS
QTC CALCULATION (BEZET), ECG08: 399 MS
VENTRICULAR RATE, ECG03: 71 BPM

## 2018-12-23 NOTE — DISCHARGE INSTRUCTIONS
Continue your routine medications after discharge from the Emergency Department      Return to the ED if your symptoms worsen.         See your PCP for a sleep apnea study

## 2018-12-23 NOTE — ED PROVIDER NOTES
41 y.o. male with past medical history significant for CVA (September 2018), HTN, who presents ambulatory with two brothers to the ED with cc of sleep problem. Pt has difficulty with language barrier, brother translating. Pt reports he has been having difficulty sleeping x 3-4 days with multiple associated symptoms as listed below. He states he wakes in the night and has difficulty falling asleep again, sleeps \"for a few minutes,\" and wakes with spinning dizziness, confusion/disorientation, generalized weakness, fatigue, epigastric abdominal pain, LUE numbness, mild headache, and shortness of breath. Per brother, pt has been waking feeling \"afraid of something. \" Pt states these symptoms have worsened today, so he went to an urgent care facility and was referred to the ED. Of note, pt states he had a CVA on 9/29/18. However, he does not know if the current LUE numbness is related to the old CVA. Per ED note on 9/29/18, pt had right-sided weakness at that time. Per brothers, pt snores when he sleeps and does not sleep often during the same. Pt specifically denies any fevers. There are no other acute medical concerns at this time. Social Hx: former Tobacco use, denies EtOH use, denies Illicit Drug use  PCP: Russell Gates MD    Note written by Truitt Bosworth, Scribe, as dictated by Oumar Valentino MD 8:29 PM        The history is provided by the patient and a relative. The history is limited by a language barrier (Romansh). A  was used (family member). Past Medical History:   Diagnosis Date    Hepatitis B immune April 2014    sAb (+) after 2 doses vaccine. cAb and sAg negative. [facility] testing.  Immunity status testing April 2014    MMR and VZV immune. 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 IGRA (+) April 2014. CXR negative. Pt did not start therapy until Jan 2015.   Pt report CXR repeated prior to 9mo INH started. [facility].  Stroke [facility]) 09/29/2018    Hospitalized with no residual deficits by Dec 2018 visit. Dr. Liana Guerrero neurologist.   [facility] Visual problems--seen by ophth prior to June 2014 appt. 6/16/2014    Notes referred to eye specialist--glasses per pt report not able to help. No past surgical history on file. No family history on file. Social History     Socioeconomic History    Marital status:      Spouse name: Not on file    Number of children: Not on file    Years of education: Not on file    Highest education level: Not on file   Social Needs    Financial resource strain: Not on file    Food insecurity - worry: Not on file    Food insecurity - inability: Not on file    Transportation needs - medical: Not on file   [employer] needs - non-medical: Not on file   Occupational History    Not on file   Tobacco Use    Smoking status: Former Smoker     Types: Cigarettes    Smokeless tobacco: Never Used   Substance and Sexual Activity    Alcohol use: No    Drug use: No    Sexual activity: Not Currently   Other Topics Concern    Not on file   Social History Narrative    Not on file         ALLERGIES: Lisinopril    Review of Systems   Constitutional: Positive for fatigue. Negative for chills, diaphoresis and fever. HENT: Negative for congestion, postnasal drip, rhinorrhea and sore throat. Eyes: Negative for photophobia, discharge, redness and visual disturbance. Respiratory: Positive for shortness of breath. Negative for cough, chest tightness and wheezing. Cardiovascular: Negative for chest pain, palpitations and leg swelling. Gastrointestinal: Positive for abdominal pain (epigastric). Negative for abdominal distention, blood in stool, constipation, diarrhea, nausea and vomiting. Genitourinary: Negative for difficulty urinating, dysuria, frequency, hematuria and urgency.    Musculoskeletal: Negative for arthralgias, back pain, joint swelling and myalgias. Skin: Negative for color change and rash. Neurological: Positive for dizziness, weakness (generalized), numbness (LUE) and headaches. Negative for speech difficulty and light-headedness. Psychiatric/Behavioral: Positive for confusion and sleep disturbance. The patient is not nervous/anxious. All other systems reviewed and are negative. Vitals:    12/22/18 1956   Pulse: 77   SpO2: 98%            Physical Exam   Constitutional: He is oriented to person, place, and time. He appears well-developed and well-nourished. No distress. HENT:   Head: Normocephalic and atraumatic. Right Ear: External ear normal.   Left Ear: External ear normal.   Nose: Nose normal.   Mouth/Throat: Oropharynx is clear and moist.   Eyes: Conjunctivae and EOM are normal. Pupils are equal, round, and reactive to light. No scleral icterus. Neck: Normal range of motion. Neck supple. No JVD present. No tracheal deviation present. No thyromegaly present. Cardiovascular: Normal rate, regular rhythm and normal heart sounds. Exam reveals no gallop and no friction rub. No murmur heard. Pulmonary/Chest: Effort normal and breath sounds normal. No respiratory distress. He has no wheezes. He has no rales. He exhibits no tenderness. Abdominal: Soft. Bowel sounds are normal. He exhibits no distension and no mass. There is no tenderness. There is no rebound and no guarding. Musculoskeletal: Normal range of motion. He exhibits no edema or tenderness. Lymphadenopathy:     He has no cervical adenopathy. Neurological: He is alert and oriented to person, place, and time. He has normal strength. He displays no atrophy and no tremor. No cranial nerve deficit. He exhibits normal muscle tone. Coordination and gait normal.   Skin: Skin is warm and dry. No rash noted. He is not diaphoretic. No erythema. Psychiatric: He has a normal mood and affect.  His behavior is normal. Judgment and thought content normal.   Nursing note and vitals reviewed. Note written by Rishi Faria, as dictated by Deloris Reid MD 8:29 PM    MDM  Number of Diagnoses or Management Options  Diagnosis management comments: MANDI  Impression: 50-year-old male with a history of hypertension hypercholesterolemia who was admitted at this hospital for right arm weakness was found to have a small left thalamic infarct now presents with several day history of poor sleep he feels short of breath after his sleep however no chest pain. He also has a complete dizziness but no neurologic findings. Considerations are sleep apnea, consider related to his medications, doubt this represents another CVA as there is no neurologic findings and this has been ongoing for several days. I reviewed his lab studies all his vessels were patent and open. Thus his original CVA was felt to be due to uncontrolled hypertension. Today his blood pressure is normal.    Plan of care we baseline labs, CT scan of the head we'll treat accordingly. Procedures    ED EKG interpretation:  NSR, rate 71, nonspecific T wave abnormality.   Note written by Rishi Faria, as dictated by Deloris Reid MD 8:27 PM

## 2018-12-23 NOTE — ED TRIAGE NOTES
Triage note : pt has had upper abdominal pain , dizziness , fatigue , problems  sleeping  And confusion  x3 days . Pt is a&o x4 , VSS .  Pt was seen here for stroke in Sept and has baseline left sided weakness

## 2018-12-27 ENCOUNTER — OFFICE VISIT (OUTPATIENT)
Dept: INTERNAL MEDICINE CLINIC | Age: 38
End: 2018-12-27

## 2018-12-27 VITALS
HEIGHT: 63 IN | WEIGHT: 176 LBS | RESPIRATION RATE: 17 BRPM | OXYGEN SATURATION: 99 % | HEART RATE: 67 BPM | TEMPERATURE: 97.7 F | BODY MASS INDEX: 31.18 KG/M2 | SYSTOLIC BLOOD PRESSURE: 125 MMHG | DIASTOLIC BLOOD PRESSURE: 78 MMHG

## 2018-12-27 DIAGNOSIS — Z72.820 POOR SLEEP: ICD-10-CM

## 2018-12-27 DIAGNOSIS — R79.89 ELEVATED LFTS: Primary | ICD-10-CM

## 2018-12-27 DIAGNOSIS — R53.83 FATIGUE, UNSPECIFIED TYPE: ICD-10-CM

## 2018-12-27 DIAGNOSIS — R40.0 DAYTIME SLEEPINESS: ICD-10-CM

## 2018-12-27 NOTE — PATIENT INSTRUCTIONS
1.  Return here to do non-fasting labs as reviewed. 2.  If there are other forms needed for return to work, please let us know. Have re-printed letter from Dr. Morelia Galvin as reviewed/requested today. 3.  Please follow the following instructions to process/authorize your referral, if needed:    Referrals processing  Please verify with your insurance IF you need referral authorization submitted. For insurance plans which require this, please follow the following steps. FAILURE TO DO SO MAY RESULT IN INABILITY TO SEE THE SPECIALIST YOU HAVE BEEN REFERRED TO (once you are scheduled to see them). 1. Call and schedule appointment with specialist  2. Call our clinic and leave message with provider name, and date of appointment  3. We will then submit the referral to your insurance. This process takes 2-5 business days. If you have questions about scheduling or authorizing referral, you can review with our referral coordinators Samia Martinez or Shaka Badillo) at the . You can review with them today if available/if you have time, or you can call to review with them once you have made your referral/appointment. If you are not sure if you need referral authorizations, please review with the referral coordinators, either prior to or after you have made the appointment, as reviewed.

## 2018-12-27 NOTE — PROGRESS NOTES
Exam room 18    Yasmine Holloway is a 41 y.o. male    Chief Complaint   Patient presents with    Dizziness     Per patient last week    Extremity Weakness     Per patient muscles are weak    Sleep Problem     Some nights unable to sleep at night     1. Have you been to the ER, urgent care clinic since your last visit? Hospitalized since your last visit? Yes, 12/2018, [facility], Dizziness    2. Have you seen or consulted any other health care providers outside of the [address] [place] [address] [facility] since your last visit? Include any pap smears or colon screening. No       There are no preventive care reminders to display for this patient.

## 2018-12-27 NOTE — PROGRESS NOTES
History of Present Illness:   Cyndi Story is a 41 y.o. male here for evaluation:    Chief Complaint   Patient presents with    Dizziness     Per patient last week    Extremity Weakness     Per patient muscles are weak    Sleep Problem     Some nights unable to sleep at night       Here to evaluate above. He notes dizzy only past week. Normal PO and no other symptoms. Notes worse with movement and walking. Reviewed good BP control on current meds. He notes feels weak in his body--no localizing symptoms. Feels tired also. Related tiredness to weakness. No focal weakness noted. Reviewed prior labs and additional lab eval here. He will return for non-fasting labs here. He notes not sleeping at night. Not sleepy when goes to bed. He goes to bed 7-8PM and up at 6AM.    He usually falls asleep by 8PM.  He still feels tired in morning--confused. No naps. He is not working. Reviewed sleep hygiene. Reviewed and he is also waking up frequently. Sleep eval reviewed. He notes end of visit may need form for return to work. He will review and let us know if specific form needed. Had coordinated work forms at last visit. Re-printed letter to return to work from Dr. Reagan Leal at visit today as requested. Past Medical History:   Diagnosis Date    Hepatitis B immune April 2014    sAb (+) after 2 doses vaccine. cAb and sAg negative. [facility] testing.  Hypertension     Immunity status testing April 2014    MMR and VZV immune. 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 IGRA (+) April 2014. CXR negative. Pt did not start therapy until Jan 2015. Pt report CXR repeated prior to 9mo INH started. [facility].  Stroke [facility]) 09/29/2018    Hospitalized with no residual deficits by Dec 2018 visit.   Dr. Agee Service neurologist.   [facility] Visual problems--seen by ophth prior to June 2014 appt. 6/16/2014    Notes referred to eye specialist--glasses per pt report not able to help. Prior to Admission medications    Medication Sig Start Date End Date Taking? Authorizing Provider   aspirin 81 mg chewable tablet Take 1 Tab by mouth daily. 12/3/18  Yes Hilario Valencia MD   amLODIPine (NORVASC) 10 mg tablet Take 1 Tab by mouth daily. 11/6/18  Yes Hilario Valencia MD   atorvastatin (LIPITOR) 40 mg tablet Take 1 Tab by mouth daily. 11/6/18  Yes Hilario Valencia MD   hydroCHLOROthiazide (HYDRODIURIL) 25 mg tablet Take 1 Tab by mouth daily. 11/6/18  Yes Hilario Valecnia MD        ROS    Vitals:    12/27/18 1615   BP: 125/78   Pulse: 67   Resp: 17   Temp: 97.7 °F (36.5 °C)   TempSrc: Oral   SpO2: 99%   Weight: 176 lb (79.8 kg)   Height: 5' 3\" (1.6 m)   PainSc:   0 - No pain      Body mass index is 31.18 kg/m². Physical Exam:     Physical Exam   Constitutional: He appears well-developed and well-nourished. No distress. HENT:   Head: Normocephalic and atraumatic. Eyes: Conjunctivae are normal. Right eye exhibits no discharge. Left eye exhibits no discharge. No scleral icterus. Cardiovascular: Normal rate, regular rhythm, normal heart sounds and intact distal pulses. Exam reveals no gallop and no friction rub. No murmur heard. Pulmonary/Chest: Effort normal and breath sounds normal. No respiratory distress. He has no wheezes. He has no rales. Abdominal: Soft. Bowel sounds are normal. He exhibits no distension. There is no tenderness. Musculoskeletal: He exhibits no edema or tenderness. Neurological: He is alert. He exhibits normal muscle tone. Coordination normal.   Skin: Skin is warm. No rash noted. He is not diaphoretic. No erythema. No pallor. Psychiatric: He has a normal mood and affect. His behavior is normal. Judgment and thought content normal.       Assessment and Plan:       ICD-10-CM ICD-9-CM    1.  Elevated LFTs R94.5 790.6 HEPATIC FUNCTION PANEL   2. Fatigue, unspecified type R53.83 780.79 VITAMIN D, 25 HYDROXY      VITAMIN B12 & FOLATE      CK      SLEEP MEDICINE REFERRAL   3. Poor sleep Z72.820 V69.4 SLEEP MEDICINE REFERRAL   4. Daytime sleepiness R40.0 780.54 SLEEP MEDICINE REFERRAL       1. Repeat labs reviewed, with fatigue eval.    2.  Additional labs to evaluate reviewed. 2,3,4. Referral reviewed. Follow-up Disposition:  Return in about 6 weeks (around 2/7/2019) for referral follow-up--4-6 weeks; non-fasting labs here in 1-2 weeks. lab results and schedule of future lab studies reviewed with patient  reviewed medications and side effects in detail    For additional documentation of information and/or recommendations discussed this visit, please see notes in instructions. Plan and evaluation (above) reviewed with pt at visit  Patient voiced understanding of plan and provided with time to ask/review questions. After Visit Summary (AVS) provided to pt after visit with additional instructions as needed/reviewed.

## 2019-02-07 ENCOUNTER — OFFICE VISIT (OUTPATIENT)
Dept: INTERNAL MEDICINE CLINIC | Age: 39
End: 2019-02-07

## 2019-02-07 VITALS — HEART RATE: 95 BPM | DIASTOLIC BLOOD PRESSURE: 81 MMHG | SYSTOLIC BLOOD PRESSURE: 149 MMHG | TEMPERATURE: 97.6 F

## 2019-02-07 DIAGNOSIS — R79.89 ELEVATED LFTS: ICD-10-CM

## 2019-02-07 DIAGNOSIS — I10 ESSENTIAL HYPERTENSION: Primary | ICD-10-CM

## 2019-02-07 DIAGNOSIS — R53.83 FATIGUE, UNSPECIFIED TYPE: ICD-10-CM

## 2019-02-07 DIAGNOSIS — Z86.73 HISTORY OF CVA (CEREBROVASCULAR ACCIDENT): ICD-10-CM

## 2019-02-07 NOTE — LETTER
NOTIFICATION RETURN TO WORK / SCHOOL 
 
2/7/2019 2:54 PM 
 
Mr. Guera Friedman [address] [facility] [address] To Whom It May Concern: 
 
Guera Friedman is currently under the care of [facility]. He will return to work/school on: February 8, 2019. He may return to work without restrictions from primary care physican or neurologist. 
 
If there are questions or concerns please have the patient contact our office.  
 
 
Sincerely, 
 
Zunilda Sewell MD

## 2019-02-07 NOTE — PROGRESS NOTES
History of Present Illness:  
Cruz Maloney is a 41 y.o. male here for evaluation: Chief Complaint Patient presents with  Referral Follow Up He notes: 
Patient not fasting at this time. Patient reports left side of face felt numb 2 weeks ago and some numbness to LLE. Patient request lab results and return to work note, wants to return tomorrow. He was seen by Dr. Ryland Holcomb and no routine follow-up recommended. He had note to return 11-19-18, from neurology. He notes he needs note to return now. He has not worked for past 4mo. He just needs note to return tomorrow. Reviewed meds, labs, follow-up. Nursing screenings reviewed by provider at visit. Prior to Admission medications Medication Sig Start Date End Date Taking? Authorizing Provider  
aspirin 81 mg chewable tablet Take 1 Tab by mouth daily. 12/3/18  Yes Raheem Montague MD  
amLODIPine (NORVASC) 10 mg tablet Take 1 Tab by mouth daily. 11/6/18  Yes Raheem Montague MD  
atorvastatin (LIPITOR) 40 mg tablet Take 1 Tab by mouth daily. 11/6/18  Yes Raheem Montague MD  
hydroCHLOROthiazide (HYDRODIURIL) 25 mg tablet Take 1 Tab by mouth daily. 11/6/18  Yes Raheem Montague MD  
  
 
ROS Vitals:  
 02/07/19 1416 BP: 149/81 Pulse: 95 Resp: (P) 16 Temp: 97.6 °F (36.4 °C) TempSrc: Oral  
SpO2: (P) 96% Weight: (P) 177 lb (80.3 kg) Height: (P) 5' 3\" (1.6 m) PainSc: (P)   0 - No pain Body mass index is 31.35 kg/m² (pended). Physical Exam:  
 
Physical Exam  
Constitutional: He appears well-developed and well-nourished. No distress. HENT:  
Head: Normocephalic and atraumatic. Eyes: Conjunctivae are normal. Right eye exhibits no discharge. Left eye exhibits no discharge. No scleral icterus. Cardiovascular: Normal rate, regular rhythm, normal heart sounds and intact distal pulses. Exam reveals no gallop and no friction rub. No murmur heard. Pulmonary/Chest: Effort normal and breath sounds normal. No respiratory distress. He has no wheezes. He has no rales. Abdominal: Soft. Bowel sounds are normal. He exhibits no distension. There is no tenderness. Musculoskeletal: He exhibits no edema or tenderness. Neurological: He is alert. He exhibits normal muscle tone. Coordination normal.  
Skin: Skin is warm. No rash noted. He is not diaphoretic. No erythema. No pallor. Psychiatric: He has a normal mood and affect. His behavior is normal. Judgment and thought content normal.  
 
 
Assessment and Plan: ICD-10-CM ICD-9-CM 1. Essential hypertension I10 401.9 2. Elevated LFTs R94.5 790.6 HEPATIC FUNCTION PANEL 3. History of CVA (cerebrovascular accident) Z86.73 V12.54   
4. Fatigue, unspecified type R53.83 780.79 CK VITAMIN B12 & FOLATE  
   VITAMIN D, 25 HYDROXY 1. Continue current medications. 2,4:  Non-fasting labs done today--not done prior to visit--ordered last visit here. 3.  Monitor LFT's on statin. Reviewed change to alternative statin therapy if needed based on labs at follow-up. Follow-up Disposition: 
Return in about 3 months (around 5/7/2019) for medication follow-up, fasting labs. lab results and schedule of future lab studies reviewed with patient 
reviewed medications and side effects in detail Plan and evaluation (above) reviewed with pt at visit Patient voiced understanding of plan and provided with time to ask/review questions. After Visit Summary (AVS) provided to pt after visit with additional instructions as needed/reviewed.

## 2019-02-07 NOTE — PROGRESS NOTES
RM 16 Patient not fasting at this time. Patient reports left side of face felt numb 2 weeks ago and some numbness to LLE. Patient request lab results and return to work note, wants to return tomorrow. Chief Complaint Patient presents with  Referral Follow Up 1. Have you been to the ER, urgent care clinic since your last visit? Hospitalized since your last visit? No 
 
2. Have you seen or consulted any other health care providers outside of the [address] [place] [address] since your last visit? Include any pap smears or colon screening. No 
 
There are no preventive care reminders to display for this patient. PHQ over the last two weeks 2/7/2019 Little interest or pleasure in doing things Not at all Feeling down, depressed, irritable, or hopeless Not at all Total Score PHQ 2 0 Abuse Screening Questionnaire 2/7/2019 Do you ever feel afraid of your partner? Isma Miller Are you in a relationship with someone who physically or mentally threatens you? Isma Miller Is it safe for you to go home? Clearance Field Learning Assessment 2/7/2019 PRIMARY LEARNER Patient BARRIERS PRIMARY LEARNER NONE PRIMARY LANGUAGE OTHER (COMMENT) LEARNER PREFERENCE PRIMARY LISTENING  
ANSWERED BY patient RELATIONSHIP SELF

## 2019-02-07 NOTE — LETTER
2019 2:44 PM 
 
Mr. Bakari Menendez [address] [facility] [address] :  1980 Please see attached lab results. --Mild elevation in ALT on medication--monitor with next labs. --Mild elevation in CK/muscle enzymes. --Vitamin D is low. Have sent weekly supplement to pharmacy. --Folate normal, but B12 slightly low-normal.  Will review at follow-up. Please let me know if you have other questions.  
 
Kale Baptiste MD

## 2019-02-08 LAB
25(OH)D3+25(OH)D2 SERPL-MCNC: 14.5 NG/ML (ref 30–100)
ALBUMIN SERPL-MCNC: 4.4 G/DL (ref 3.5–5.5)
ALP SERPL-CCNC: 64 IU/L (ref 39–117)
ALT SERPL-CCNC: 54 IU/L (ref 0–44)
AST SERPL-CCNC: 40 IU/L (ref 0–40)
BILIRUB DIRECT SERPL-MCNC: 0.19 MG/DL (ref 0–0.4)
BILIRUB SERPL-MCNC: 0.6 MG/DL (ref 0–1.2)
CK SERPL-CCNC: 357 U/L (ref 24–204)
FOLATE SERPL-MCNC: 7.5 NG/ML
PROT SERPL-MCNC: 7.1 G/DL (ref 6–8.5)
VIT B12 SERPL-MCNC: 287 PG/ML (ref 232–1245)

## 2019-03-26 ENCOUNTER — OFFICE VISIT (OUTPATIENT)
Dept: SLEEP MEDICINE | Age: 39
End: 2019-03-26

## 2019-03-26 VITALS
DIASTOLIC BLOOD PRESSURE: 77 MMHG | BODY MASS INDEX: 31.18 KG/M2 | HEIGHT: 63 IN | HEART RATE: 92 BPM | OXYGEN SATURATION: 97 % | WEIGHT: 176 LBS | SYSTOLIC BLOOD PRESSURE: 131 MMHG

## 2019-03-26 DIAGNOSIS — Z86.73 H/O: STROKE: ICD-10-CM

## 2019-03-26 DIAGNOSIS — I10 ESSENTIAL HYPERTENSION: ICD-10-CM

## 2019-03-26 DIAGNOSIS — G47.33 OSA (OBSTRUCTIVE SLEEP APNEA): Primary | ICD-10-CM

## 2019-03-26 NOTE — PROGRESS NOTES
[address] [facility] [place]. [place], [address]  Tel.  [phone]  Fax. 100 [facility] 60  [place], [address] [facility]  Tel.  [phone]  Fax. [phone] [address]  Tel.  [phone]  Fax. [phone]         Subjective:      Ruben Morrison is an 41 y.o. male referred for evaluation for a sleep disorder. He complains of snoring associated with awakening in the middle of the night because of urination and of waking up confused after sleeping 7 to 8 hours. Symptoms began 4 years ago, unchanged since that time. He usually can fall asleep in 30 minutes. Family or house members note snoring. He denies completely or partially paralyzed while falling asleep or waking up. Ruben Morrison does not wake up frequently at night. He is not bothered by waking up too early and left unable to get back to sleep. He actually sleeps about 10 hours at night and wakes up about 3 times during the night. He does work shifts:  First Shift. Mickey indicates he does not get too little sleep at night. His bedtime is 2100. He awakens at 0700. He does not take naps. He has the following observed behaviors: Light snoring, Loud snoring;  . Other remarks:  He reports of a hospitalization of stroke in October 2018. [place] Sleepiness Score: 4     Allergies   Allergen Reactions    Lisinopril Angioedema     Lips had tingling/numbness         Current Outpatient Medications:     aspirin 81 mg chewable tablet, Take 1 Tab by mouth daily. , Disp: 30 Tab, Rfl: 5    amLODIPine (NORVASC) 10 mg tablet, Take 1 Tab by mouth daily. , Disp: 30 Tab, Rfl: 5    atorvastatin (LIPITOR) 40 mg tablet, Take 1 Tab by mouth daily. , Disp: 30 Tab, Rfl: 5    hydroCHLOROthiazide (HYDRODIURIL) 25 mg tablet, Take 1 Tab by mouth daily. , Disp: 30 Tab, Rfl: 5    ergocalciferol (ERGOCALCIFEROL) 50,000 unit capsule, Take 1 Cap by mouth every seven (7) days for 12 doses. 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 (6/16/2014). He  has no past surgical history on file. He family history is not on file. He  reports that he has quit smoking. His smoking use included cigarettes. He has never used smokeless tobacco. He reports that he does not drink alcohol or use drugs. Review of Systems:  Constitutional:  No significant weight loss or weight gain  Eyes:  No blurred vision  CVS:  No significant chest pain  Pulm:  No significant shortness of breath  GI:  No significant nausea or vomiting  : significant nocturia  Musculoskeletal:  No significant joint pain at night  Skin:  No significant rashes  Neuro:  No significant dizziness   Psych:  No active mood issues    Sleep Review of Systems: notable for no difficulty falling asleep; frequent awakenings at night;  regular dreaming noted; no nightmares ; no early morning headaches; no memory problems; no concentration issues; no history of any automobile or occupational accidents due to daytime drowsiness. Objective:     Visit Vitals  /77   Pulse 92   Ht 5' 3\" (1.6 m)   Wt 176 lb (79.8 kg)   SpO2 97%   BMI 31.18 kg/m²         General:   Not in acute distress   Eyes:  Anicteric sclerae, no obvious strabismus   Nose:  No obvious nasal septum deviation    Oropharynx:   Class 4 oropharyngeal outlet, thick tongue base, uvula could not be seen due to low-lying soft palate, narrow tonsilo-pharyngeal pilars   Tonsils:   tonsils are not seen due to low-lying soft palate   Neck:   Neck circ.  in \"inches\": 15; midline trachea   Chest/Lungs:  Equal lung expansion, clear on auscultation    CVS:  Normal rate, regular rhythm; no JVD   Skin:  Warm to touch; no obvious rashes   Neuro:  No focal deficits ; no obvious tremor    Psych: Normal affect,  normal countenance;          Assessment:       ICD-10-CM ICD-9-CM    1. TARAH (obstructive sleep apnea) G47.33 327.23 SLEEP STUDY UNATTENDED, 4 CHANNEL   2. Essential hypertension I10 401.9    3. BMI 31.0-31.9,adult Z68.31 V85.31    4. H/O: stroke Z86.73 V12.54          Plan:     * The patient currently has a Moderate Risk for having sleep apnea. STOP-BANG score 4.  * Sleep testing was ordered for initial evaluation. * He was provided information on sleep apnea including coresponding risk factors and the importance of proper treatment. * Treatment options if indicated were reviewed today. Patient agrees to a trial of PAP therapy if indicated. * Counseling was provided regarding proper sleep hygiene (including effect of light on sleep), paradoxical intention, stimulus control, sleep environment safety and safe driving. * Effect of sleep disturbance on weight was reviewed. We have recommended a dedicated weight loss through appropriate diet and an exercise regiment as significant weight reduction has been shown to reduce severity of obstructive sleep apnea. * Patient agrees to telephone [phone]  follow-up by myself or lead sleep technologist shortly after sleep study to review results and plan final management.     (patient has given permission for a message to be left regarding test results and further management if patient cannot be cannot be reached directly). Thank you for allowing us to participate in your patient's medical care. We'll keep you updated on these investigations. Rory Valdez MD, FAASM  Electronically signed.  03/26/19

## 2019-04-08 ENCOUNTER — TELEPHONE (OUTPATIENT)
Dept: SLEEP MEDICINE | Age: 39
End: 2019-04-08

## 2019-04-08 DIAGNOSIS — G47.33 OSA (OBSTRUCTIVE SLEEP APNEA): Primary | ICD-10-CM

## 2019-04-10 NOTE — TELEPHONE ENCOUNTER
Juvenal Luque is to be contacted by lead sleep technologist regarding results of Sleep Testing which was indicative of an average AHI of 18.7 per hour. An APAP prescription has been written and patient will be contacted by office staff regarding follow-up  in 2-3 months after initiation of therapy. Encounter Diagnosis   Name Primary?  TARAH (obstructive sleep apnea) Yes       Orders Placed This Encounter    AMB SUPPLY ORDER     Diagnosis: Obstructive Sleep Apnea ICD-10 Code (G47.33)    Positive Airway Pressure Therapy: Duration of need: 99 months. ResMed APAP Device with Heated Humidifer [id number] / [id number]. Minimum Pressure: 4 cmH2O, Maximum Pressure: 20 cmH2O.  Nasal Cushion (Replace) 2 per month.  Nasal Interface Mask 1 every 3 months.  Headgear 1 every 6 months.  Filter(s) Disposable 2 per month.  Filter(s) Non-Disposable 1 every 6 months. [address] for [employer] (Replace) 1 every 6 months.  Tubing with heating element 1 every 3 months. Perform Mask Fitting per patient preference and comfort - replace as above. René Garcia MD, FAASM; NPI: [id number]  Electronically signed. 04/10/19

## 2019-04-11 NOTE — TELEPHONE ENCOUNTER
Reviewed sleep study results with patient. He expressed understanding and is willing to proceed with a trial of APAP. Fax DME order & Schedule 1st adherence visit in 60 to 90 days. Please use [facility] if possible.

## 2019-04-12 ENCOUNTER — DOCUMENTATION ONLY (OUTPATIENT)
Dept: SLEEP MEDICINE | Age: 39
End: 2019-04-12

## 2019-05-06 RX ORDER — AMLODIPINE BESYLATE 10 MG/1
TABLET ORAL
Qty: 30 TAB | Refills: 5 | Status: SHIPPED | OUTPATIENT
Start: 2019-05-06 | End: 2019-09-16 | Stop reason: SDUPTHER

## 2019-05-06 RX ORDER — ATORVASTATIN CALCIUM 40 MG/1
TABLET, FILM COATED ORAL
Qty: 30 TAB | Refills: 5 | Status: SHIPPED | OUTPATIENT
Start: 2019-05-06 | End: 2019-11-06 | Stop reason: SDUPTHER

## 2019-05-06 RX ORDER — HYDROCHLOROTHIAZIDE 25 MG/1
TABLET ORAL
Qty: 30 TAB | Refills: 5 | Status: SHIPPED | OUTPATIENT
Start: 2019-05-06 | End: 2019-11-06 | Stop reason: SDUPTHER

## 2019-05-07 ENCOUNTER — OFFICE VISIT (OUTPATIENT)
Dept: INTERNAL MEDICINE CLINIC | Age: 39
End: 2019-05-07

## 2019-05-07 VITALS
HEIGHT: 63 IN | SYSTOLIC BLOOD PRESSURE: 122 MMHG | BODY MASS INDEX: 29.81 KG/M2 | TEMPERATURE: 97.8 F | DIASTOLIC BLOOD PRESSURE: 77 MMHG | HEART RATE: 74 BPM | RESPIRATION RATE: 14 BRPM | WEIGHT: 168.25 LBS | OXYGEN SATURATION: 97 %

## 2019-05-07 DIAGNOSIS — I10 ESSENTIAL HYPERTENSION: Primary | ICD-10-CM

## 2019-05-07 DIAGNOSIS — E55.9 HYPOVITAMINOSIS D: ICD-10-CM

## 2019-05-07 DIAGNOSIS — R79.89 ELEVATED LFTS: ICD-10-CM

## 2019-05-07 DIAGNOSIS — E78.1 HYPERTRIGLYCERIDEMIA: ICD-10-CM

## 2019-05-07 DIAGNOSIS — E53.8 LOW VITAMIN B12 LEVEL: ICD-10-CM

## 2019-05-07 NOTE — PROGRESS NOTES
History of Present Illness:  
Jimmy Peter is a 41 y.o. male here for evaluation: Chief Complaint Patient presents with  Medication Evaluation  
  follow up  Labs Patient fasting at this time. Here for medication/referral follow-up. In interim seen for sleep study and dx with TARAH. Dr. Noreen Tovar sent in script for therapy--pt noted he has received therapy/device and using and feels better with therapy. Sleep medicine follow-up scheduled as below. Reviewed prior labs--his LFT's had improved--ALT minimally elevated at 54. He is fasting for labs today. B12 slightly low, with mild CK elevation and low Vit D. Vit D supplement sent to pharmacy on 2-25 and he is taking weekly as directed. He is taking supplement and confirms has about one month remaining of therapy. Reviewed based on labs today, would determine need for specific Vit D dose for continued therapy. He has returned to work and not having any problems. Nursing screenings reviewed by provider at visit. Prior to Admission medications Medication Sig Start Date End Date Taking? Authorizing Provider  
hydroCHLOROthiazide (HYDRODIURIL) 25 mg tablet TAKE 1 TABLET BY MOUTH EVERY DAY 5/6/19  Yes Nickie Malloy MD  
atorvastatin (LIPITOR) 40 mg tablet TAKE 1 TABLET BY MOUTH EVERY DAY 5/6/19  Yes Nickie Malloy MD  
amLODIPine (NORVASC) 10 mg tablet TAKE 1 TABLET BY MOUTH EVERY DAY 5/6/19  Yes Nickie Malloy MD  
ergocalciferol (ERGOCALCIFEROL) 50,000 unit capsule Take 1 Cap by mouth every seven (7) days for 12 doses. 2/25/19 5/14/19 Yes Nickie Malloy MD  
aspirin 81 mg chewable tablet Take 1 Tab by mouth daily. 12/3/18  Yes Nickie Malloy MD  
  
 
ROS Vitals:  
 05/07/19 6079 BP: 122/77 Pulse: 74 Resp: 14 Temp: 97.8 °F (36.6 °C) TempSrc: Oral  
SpO2: 97% Weight: 168 lb 4 oz (76.3 kg) Height: 5' 3\" (1.6 m) PainSc:   0 - No pain Body mass index is 29.8 kg/m². Physical Exam:  
 
Physical Exam  
Constitutional: He appears well-developed and well-nourished. No distress. HENT:  
Head: Normocephalic and atraumatic. Eyes: Conjunctivae are normal. Right eye exhibits no discharge. Left eye exhibits no discharge. No scleral icterus. Cardiovascular: Normal rate, regular rhythm, normal heart sounds and intact distal pulses. Exam reveals no gallop and no friction rub. No murmur heard. Pulmonary/Chest: Effort normal and breath sounds normal. No respiratory distress. He has no wheezes. He has no rales. Abdominal: Soft. Bowel sounds are normal. He exhibits no distension. There is no tenderness. Musculoskeletal: He exhibits no edema or tenderness. Neurological: He is alert. He exhibits normal muscle tone. Coordination normal.  
Skin: Skin is warm. No rash noted. He is not diaphoretic. No erythema. No pallor. Psychiatric: He has a normal mood and affect. His behavior is normal. Judgment and thought content normal.  
 
 
Assessment and Plan: ICD-10-CM ICD-9-CM 1. Essential hypertension I10 401.9 2. Hypertriglyceridemia L86.1 994.6 METABOLIC PANEL, COMPREHENSIVE  
   LIPID PANEL  
   CK 3. Low vitamin B12 level E53.8 266.2 VITAMIN B12  
4. Hypovitaminosis D E55.9 268.9 VITAMIN D, 25 HYDROXY 5. Elevated LFTs E11.6 814.0 METABOLIC PANEL, COMPREHENSIVE  
   LIPID PANEL 1. Medications refilled yesterday. Reviewed with pt at visit--HCTZ and amlodipine. Reviewed again with pt at check-out. 
 
2,5. Fasting labs today. Reviewed refilled atorvastatin yesterday. Reviewed again with pt at . [facility] [address]. Monitor LFT's on medication. 3.  Repeat reviewed. Consider supplement if still low-normal on re-testing. 4.  Completing 12 week repletion with 50K units weekly. Confirmed on 2nd month therapy with ~4wks remaining. Supplemental dosing reviewed as script or OTC based on level today. Follow-up and Dispositions · Return in about 3 months (around 8/7/2019), or if symptoms worsen or fail to improve, for medication follow-up, lab review. lab results and schedule of future lab studies reviewed with patient 
reviewed medications and side effects in detail Plan and evaluation (above) reviewed with pt at visit Patient voiced understanding of plan and provided with time to ask/review questions. After Visit Summary (AVS) provided to pt after visit with additional instructions as needed/reviewed. Future Appointments Date Time Provider Spring Fontaine 6/25/2019  4:20 PM Ronan Kevin MD [facility] [address] [employer]

## 2019-05-07 NOTE — PROGRESS NOTES
RM 17 Chief Complaint Patient presents with  Medication Evaluation  
  follow up  Labs Patient fasting at this time. 1. Have you been to the ER, urgent care clinic since your last visit? Hospitalized since your last visit? No 
 
2. Have you seen or consulted any other health care providers outside of the [address] [place] [address] since your last visit? Include any pap smears or colon screening. No 
 
There are no preventive care reminders to display for this patient. Abuse Screening Questionnaire 5/7/2019 Do you ever feel afraid of your partner? Josef Mack Are you in a relationship with someone who physically or mentally threatens you? Josef Mack Is it safe for you to go home? Y  
 
3 most recent PHQ Screens 5/7/2019 Little interest or pleasure in doing things Not at all Feeling down, depressed, irritable, or hopeless Not at all Total Score PHQ 2 0

## 2019-05-08 ENCOUNTER — DOCUMENTATION ONLY (OUTPATIENT)
Dept: SLEEP MEDICINE | Age: 39
End: 2019-05-08

## 2019-05-08 LAB
25(OH)D3+25(OH)D2 SERPL-MCNC: 20.4 NG/ML (ref 30–100)
ALBUMIN SERPL-MCNC: 4.4 G/DL (ref 3.5–5.5)
ALBUMIN/GLOB SERPL: 1.5 {RATIO} (ref 1.2–2.2)
ALP SERPL-CCNC: 72 IU/L (ref 39–117)
ALT SERPL-CCNC: 45 IU/L (ref 0–44)
AST SERPL-CCNC: 29 IU/L (ref 0–40)
BILIRUB SERPL-MCNC: 1 MG/DL (ref 0–1.2)
BUN SERPL-MCNC: 8 MG/DL (ref 6–20)
BUN/CREAT SERPL: 8 (ref 9–20)
CALCIUM SERPL-MCNC: 9.2 MG/DL (ref 8.7–10.2)
CHLORIDE SERPL-SCNC: 104 MMOL/L (ref 96–106)
CHOLEST SERPL-MCNC: 85 MG/DL (ref 100–199)
CK SERPL-CCNC: 111 U/L (ref 24–204)
CO2 SERPL-SCNC: 24 MMOL/L (ref 20–29)
CREAT SERPL-MCNC: 0.96 MG/DL (ref 0.76–1.27)
GLOBULIN SER CALC-MCNC: 2.9 G/DL (ref 1.5–4.5)
GLUCOSE SERPL-MCNC: 89 MG/DL (ref 65–99)
HDLC SERPL-MCNC: 28 MG/DL
LDLC SERPL CALC-MCNC: 36 MG/DL (ref 0–99)
POTASSIUM SERPL-SCNC: 3.9 MMOL/L (ref 3.5–5.2)
PROT SERPL-MCNC: 7.3 G/DL (ref 6–8.5)
SODIUM SERPL-SCNC: 142 MMOL/L (ref 134–144)
TRIGL SERPL-MCNC: 105 MG/DL (ref 0–149)
VIT B12 SERPL-MCNC: 256 PG/ML (ref 232–1245)
VLDLC SERPL CALC-MCNC: 21 MG/DL (ref 5–40)

## 2019-05-13 ENCOUNTER — TELEPHONE (OUTPATIENT)
Dept: INTERNAL MEDICINE CLINIC | Age: 39
End: 2019-05-13

## 2019-05-13 DIAGNOSIS — E55.9 HYPOVITAMINOSIS D: Primary | ICD-10-CM

## 2019-05-13 DIAGNOSIS — E53.8 LOW SERUM VITAMIN B12: ICD-10-CM

## 2019-05-13 RX ORDER — ERGOCALCIFEROL 1.25 MG/1
50000 CAPSULE ORAL
Qty: 8 CAP | Refills: 0 | Status: SHIPPED | OUTPATIENT
Start: 2019-05-13 | End: 2019-07-02

## 2019-05-13 RX ORDER — LANOLIN ALCOHOL/MO/W.PET/CERES
1000 CREAM (GRAM) TOPICAL DAILY
Qty: 90 TAB | Refills: 4 | Status: SHIPPED | OUTPATIENT
Start: 2019-05-13 | End: 2020-05-18

## 2019-05-13 NOTE — TELEPHONE ENCOUNTER
Notified patient of provider's recommendations and of prescriptions sent to the pharmacy. Patient verbalized understanding.

## 2019-05-13 NOTE — TELEPHONE ENCOUNTER
Please patient and advise;    - labs reviewed on behalf of Dr. Rylie Rojas who is out of the office. I recommend:  1) continue vitamin d supplement for a total of 20 weeks. I have sent 8 additional doses to the pharmacy. After this take 1 over the counter size dose. 2) vitamin B12 is staying low. Does he eat a restricted vegetarian diet? If not does he have chronic diarrhea, or heartburn? - I will advise to start on a daily vitamin B12 supplement. Remainder of labs overall stable. Please advise to continue to avoid all alcohol. Elissa Lemons MD        Lab comment:  \"  Vitamin D is improved from 14 to 20 ~ 9 weeks of therapy. With active cardiovascular disease, will advise to complete additional 2 months for a total of 20 weeks of therapy. (see phone note). Liver enzymes continue to be improved, no need to stop statin. Will encouarge to avoid all alcohol (prior viral hepatitis Screening done in 2014 for B and C negative. Cholesterol much improved, continue current dose.  Vitamin B12 low, but normal. Would advise to start on new b12 supplement (does patient follow a vegetarian or vegan diet?)\"

## 2019-05-13 NOTE — PROGRESS NOTES
Vitamin D is improved from 14 to 20 ~ 9 weeks of therapy. With active cardiovascular disease, will advise to complete additional 2 months for a total of 20 weeks of therapy. (see phone note). Liver enzymes continue to be improved, no need to stop statin. Will encouarge to avoid all alcohol (prior viral hepatitis Screening done in 2014 for B and C negative. Cholesterol much improved, continue current dose. Vitamin B12 low, but normal. Would advise to start on new b12 supplement (does patient follow a vegetarian or vegan diet?) See phone note.   
Pablo Crawford MD

## 2019-06-13 DIAGNOSIS — Z86.73 HISTORY OF CVA (CEREBROVASCULAR ACCIDENT): ICD-10-CM

## 2019-06-13 RX ORDER — GUAIFENESIN 100 MG/5ML
LIQUID (ML) ORAL
Qty: 30 TAB | Refills: 5 | Status: SHIPPED | OUTPATIENT
Start: 2019-06-13 | End: 2020-06-11 | Stop reason: SDUPTHER

## 2019-06-25 ENCOUNTER — OFFICE VISIT (OUTPATIENT)
Dept: SLEEP MEDICINE | Age: 39
End: 2019-06-25

## 2019-06-25 VITALS
DIASTOLIC BLOOD PRESSURE: 75 MMHG | HEART RATE: 95 BPM | BODY MASS INDEX: 30.48 KG/M2 | OXYGEN SATURATION: 97 % | WEIGHT: 172 LBS | HEIGHT: 63 IN | SYSTOLIC BLOOD PRESSURE: 117 MMHG

## 2019-06-25 DIAGNOSIS — G47.33 OSA (OBSTRUCTIVE SLEEP APNEA): Primary | ICD-10-CM

## 2019-06-25 DIAGNOSIS — I10 ESSENTIAL HYPERTENSION: ICD-10-CM

## 2019-06-25 NOTE — PROGRESS NOTES
217 [facility]. [place], [address]  Tel.  [phone]  Fax. 100 [facility] 60  [place], [address] [address] [facility] [address]  Tel.  [phone]  Fax. [phone] [address] [facility] [address]   Tel.  [phone]  Fax. [phone]     S>Mickey FELIZ Kash Osman is a 46 y.o. male seen for a positive airway pressure follow-up. He reports problems using the device. He is 0 / 88 usage% compliant over the past 30 days. The following problems are identified:    Drowsiness no Problems exhaling no   Snoring no Forget to put on no   Mask Comfortable yes Can't fall asleep no   Dry Mouth no Mask falls off no   Air Leaking no Frequent awakenings no         He admits that his sleep has improved on PAP therapy using nasal mask and non-heated tubing. He reports of difficulties tolerating CPAP after a couple of hours, finds it difficult to exhale and discontinues therapy. Allergies   Allergen Reactions    Lisinopril Angioedema     Lips had tingling/numbness       He has a current medication list which includes the following prescription(s): aspirin, ergocalciferol, cyanocobalamin, hydrochlorothiazide, atorvastatin, and amlodipine. .      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 (09/29/2018), and Visual problems--seen by ophth prior to June 2014 appt. (6/16/2014). [place] Sleepiness Score: 9   and Modified F.O.S.Q. Score Total / 2: 20   which reflect improved sleep quality over therapy time.     O>    Visit Vitals  /75   Pulse 95   Ht 5' 3\" (1.6 m)   Wt 172 lb (78 kg)   SpO2 97%   BMI 30.47 kg/m²         General:   Not in acute distress   Eyes:  Anicteric sclerae, no obvious strabismus   Nose:  No obvious nasal septum deviation    Oropharynx:   Class 4 oropharyngeal outlet, thick tongue base, uvula not seen due to low-lying soft palate, narrow tonsilo-pharyngeal pilars   Tonsils:   tonsils are not visualized due to low-lying soft palate   Neck:   midline trachea   Chest/Lungs:  Equal lung expansion, clear on auscultation    CVS:  Normal rate, regular rhythm; no JVD   Skin:  Warm to touch; no obvious rashes   Neuro:  No focal deficits ; no obvious tremor    Psych:  Normal affect,  normal countenance;           A>    ICD-10-CM ICD-9-CM    1. TARAH (obstructive sleep apnea) G47.33 327.23 SLEEP LAB (PAP TITRATION)   2. BMI 30.0-30.9,adult Z68.30 V85.30    3. Essential hypertension I10 401.9      AHI = 18.7 (2019). On Resmed :  APAP 4 - 20 cmH2O. Compliant:      no    Therapeutic Response:  Negative    P>    Orders Placed This Encounter    SLEEP LAB (PAP TITRATION)     Standing Status:   Future     Standing Expiration Date:   12/25/2019     Order Specific Question:   Reason for Exam     Answer:   TARAH       * We have recommended a dedicated weight loss through appropriate diet and an exercise regiment as significant weight reduction has been shown to reduce severity of obstructive sleep apnea. *   Follow-up and Dispositions    · Return in about 1 year (around 6/25/2020), or if symptoms worsen or fail to improve. * He was asked to contact our office for any problems regarding PAP therapy. * Counseling was provided regarding the importance of regular PAP use and on proper sleep hygiene and safe driving. * Re-enforced proper and regular cleaning for the device. Thank you for allowing us to participate in your patient's medical care. Nance Duverney, MD, FAASM  Electronically signed.  06/25/19

## 2019-06-25 NOTE — PATIENT INSTRUCTIONS
217 [facility] [place]. [place], [address]  Tel.  [phone]  Fax. 100 [facility] 60  [place], [address] [facility]  Tel.  [phone]  Fax. [phone] [address]  Tel.  [phone]  Fax. [phone]     Learning About CPAP for Sleep Apnea  What is CPAP? CPAP is a small machine that you use at home every night while you sleep. It increases air pressure in your throat to keep your airway open. When you have sleep apnea, this can help you sleep better so you feel much better. CPAP stands for \"continuous positive airway pressure. \"  The CPAP machine will have one of the following:  · A mask that covers your nose and mouth  · Prongs that fit into your nose  · A mask that covers your nose only, the most common type. This type is called NCPAP. The N stands for \"nasal.\"  Why is it done? CPAP is usually the best treatment for obstructive sleep apnea. It is the first treatment choice and the most widely used. Your doctor may suggest CPAP if you have:  · Moderate to severe sleep apnea. · Sleep apnea and coronary artery disease (CAD) or heart failure. How does it help? · CPAP can help you have more normal sleep, so you feel less sleepy and more alert during the daytime. · CPAP may help keep heart failure or other heart problems from getting worse. · NCPAP may help lower your blood pressure. · If you use CPAP, your bed partner may also sleep better because you are not snoring or restless. What are the side effects? Some people who use CPAP have:  · A dry or stuffy nose and a sore throat. · Irritated skin on the face. · Sore eyes. · Bloating. If you have any of these problems, work with your doctor to fix them. Here are some things you can try:  · Be sure the mask or nasal prongs fit well. · See if your doctor can adjust the pressure of your CPAP. · If your nose is dry, try a humidifier.   · If your nose is runny or stuffy, try decongestant medicine or a steroid nasal spray. If these things do not help, you might try a different type of machine. Some machines have air pressure that adjusts on its own. Others have air pressures that are different when you breathe in than when you breathe out. This may reduce discomfort caused by too much pressure in your nose. Where can you learn more? Go to esolidar.be  Enter Corky Boss in the search box to learn more about \"Learning About CPAP for Sleep Apnea. \"   © 6731-4199 Healthwise, Incorporated. Care instructions adapted under license by [employer] (which disclaims liability or warranty for this information). This care instruction is for use with your licensed healthcare professional. If you have questions about a medical condition or this instruction, always ask your healthcare professional. [address] any warranty or liability for your use of this information. Content Version: 2.7.65348; Last Revised: January 11, 2010  PROPER SLEEP HYGIENE    What to avoid  · Do not have drinks with caffeine, such as coffee or black tea, for 8 hours before bed. · Do not smoke or use other types of tobacco near bedtime. Nicotine is a stimulant and can keep you awake. · Avoid drinking alcohol late in the evening, because it can cause you to wake in the middle of the night. · Do not eat a big meal close to bedtime. If you are hungry, eat a light snack. · Do not drink a lot of water close to bedtime, because the need to urinate may wake you up during the night. · Do not read or watch TV in bed. Use the bed only for sleeping and sexual activity. What to try  · Go to bed at the same time every night, and wake up at the same time every morning. Do not take naps during the day. · Keep your bedroom quiet, dark, and cool. · Get regular exercise, but not within 3 to 4 hours of your bedtime. .  · Sleep on a comfortable pillow and mattress.   · If watching the clock makes you anxious, turn it facing away from you so you cannot see the time. · If you worry when you lie down, start a worry book. Well before bedtime, write down your worries, and then set the book and your concerns aside. · Try meditation or other relaxation techniques before you go to bed. · If you cannot fall asleep, get up and go to another room until you feel sleepy. Do something relaxing. Repeat your bedtime routine before you go to bed again. · Make your house quiet and calm about an hour before bedtime. Turn down the lights, turn off the TV, log off the computer, and turn down the volume on music. This can help you relax after a busy day. Drowsy Driving: The [employer] cites drowsiness as a causing factor in more than 243,098 police reported crashes annually, resulting in 76,000 injuries and 1,500 deaths. Other surveys suggest 55% of people polled have driven while drowsy in the past year, 23% had fallen asleep but not crashed, 3% crashed, and 2% had and accident due to drowsy driving. Who is at risk? Young Drivers: One study of drowsy driving accidents states that 55% of the drivers were under 25 years. Of those, 75% were male. Shift Workers and Travelers: People who work overnight or travel across time zones frequently are at higher risk of experiencing Circadian Rhythm Disorders. They are trying to work and function when their body is programed to sleep. Sleep Deprived: Lack of sleep has a serious impact on your ability to pay attention or focus on a task. Consistently getting less than the average of 8 hours your body needs creates partial or cumulative sleep deprivation. Untreated Sleep Disorders: Sleep Apnea, Narcolepsy, R.L.S., and other sleep disorders (untreated) prevent a person from getting enough restful sleep. This leads to excessive daytime sleepiness and increases the risk for drowsy driving accidents by up to 7 times.   Medications / Alcohol: Even over the counter medications can cause drowsiness. Medications that impair a drivers attention should have a warning label. Alcohol naturally makes you sleepy and on its own can cause accidents. Combined with excessive drowsiness its effects are amplified. Signs of Drowsy Driving:   * You don't remember driving the last few miles   * You may drift out of your ignacio   * You are unable to focus and your thoughts wander   * You may yawn more often than normal   * You have difficulty keeping your eyes open / nodding off   * Missing traffic signs, speeding, or tailgating  Prevention-   Good sleep hygiene, lifestyle and behavioral choices have the most impact on drowsy driving. There is no substitute for sleep and the average person requires 8 hours nightly. If you find yourself driving drowsy, stop and sleep. Consider the sleep hygiene tips provided during your visit as well. Medication Refill Policy: Refills for all medications require 1 week advance notice. Please have your pharmacy fax a refill request. We are unable to fax, or call in \"controled substance\" medications and you will need to pick these prescriptions up from our office. [employer] Activation    Thank you for requesting access to [employer]. Please follow the instructions below to securely access and download your online medical record. [employer] allows you to send messages to your doctor, view your test results, renew your prescriptions, schedule appointments, and more. How Do I Sign Up? 1. In your internet browser, go to [email or web address]. 2. Click on the First Time User? Click Here link in the Sign In box. You will see the New Member Sign Up page. 3. Enter your [employer] Access Code exactly as it appears below. You will not need to use this code after youve completed the sign-up process. If you do not sign up before the expiration date, you must request a new code.     [employer] Access Code: [email or web address]  Expires: 2019  4:02 PM (This is the date your GLOG access code will )    4. Enter the last four digits of your Social Security Number (xxxx) and Date of Birth (mm/dd/yyyy) as indicated and click Submit. You will be taken to the next sign-up page. 5. Create a GLOG ID. This will be your GLOG login ID and cannot be changed, so think of one that is secure and easy to remember. 6. Create a GLOG password. You can change your password at any time. 7. Enter your Password Reset Question and Answer. This can be used at a later time if you forget your password. 8. Enter your e-mail address. You will receive e-mail notification when new information is available in [address] 9. Click Sign Up. You can now view and download portions of your medical record. 10. Click the Download Summary menu link to download a portable copy of your medical information. Additional Information    If you have questions, please call [phone]. Remember, GLOG is NOT to be used for urgent needs. For medical emergencies, dial 911.

## 2019-08-12 ENCOUNTER — OFFICE VISIT (OUTPATIENT)
Dept: INTERNAL MEDICINE CLINIC | Age: 39
End: 2019-08-12

## 2019-08-12 ENCOUNTER — HOSPITAL ENCOUNTER (OUTPATIENT)
Dept: GENERAL RADIOLOGY | Age: 39
Discharge: HOME | End: 2019-08-12
Payer: COMMERCIAL

## 2019-08-12 VITALS
RESPIRATION RATE: 14 BRPM | DIASTOLIC BLOOD PRESSURE: 81 MMHG | SYSTOLIC BLOOD PRESSURE: 137 MMHG | HEART RATE: 77 BPM | OXYGEN SATURATION: 96 % | WEIGHT: 173 LBS | BODY MASS INDEX: 30.65 KG/M2 | HEIGHT: 63 IN | TEMPERATURE: 97.9 F

## 2019-08-12 DIAGNOSIS — M79.642 BILATERAL HAND PAIN: ICD-10-CM

## 2019-08-12 DIAGNOSIS — M79.641 BILATERAL HAND PAIN: ICD-10-CM

## 2019-08-12 DIAGNOSIS — I10 ESSENTIAL HYPERTENSION: Primary | ICD-10-CM

## 2019-08-12 DIAGNOSIS — E53.8 LOW SERUM VITAMIN B12: ICD-10-CM

## 2019-08-12 DIAGNOSIS — E55.9 HYPOVITAMINOSIS D: ICD-10-CM

## 2019-08-12 DIAGNOSIS — E78.1 HYPERTRIGLYCERIDEMIA: ICD-10-CM

## 2019-08-12 PROCEDURE — 73130 X-RAY EXAM OF HAND: CPT

## 2019-08-12 RX ORDER — ERGOCALCIFEROL 1.25 MG/1
CAPSULE ORAL
Refills: 0 | COMMUNITY
Start: 2019-08-06 | End: 2019-09-03 | Stop reason: DRUGHIGH

## 2019-08-12 NOTE — PATIENT INSTRUCTIONS
1.  Take acetaminophen (Tylenol or generic):  500mg-650mg every 6hr for hand pain as needed. Do not drink alcohol with acetaminophen. 2.  You can take ibuprofen with acetaminophen if needed. Take 400mg to 600mg ibuprofen every 6-8 hours with food, as needed for hand pain.

## 2019-08-12 NOTE — LETTER
2019 2:43 PM 
 
Mr. Farhad Broussard [facility] [address] :  1980 Please see attached lab results. --Vitamin D testing normal.  Continue current level of Vitamin D in diet/supplement. --Vitamin B12 testing noraml. Continue current B12 supplement. --Labs for inflammatory testing normal.  These are the C-reactive protein and Sed rate (ESR). Rheumatoid factor and DMITRIY also negative/normal. 
 
--Your A1c testing is abnormal.  Plan to repeat this test in 3 months. The A1c test measures your blood glucose (or blood sugar) and serves as a 3mo average of all your blood glucose values. If A1c is above 5.6 but below 6.5, you are considered pre-diabetic or to have impaired glucose regulation (sometimes called impaired glucose tolerance). A1c values greater than 6.5 are considered diagnostic of diabetes. The A1c value can be translated into an average blood glucose, which now shows up with the result in some tests. Sample A1cs with their corresponding average blood glucose values are listed below: 
--An A1c of 5.6 correlates with an average blood glucose of 114. --An A1c of 6.0 correlates with an average blood glucose of 125. --An A1c of 6.5 correlates with an average blood glucose of 140. --An A1c of 7.0 correlates with an average blood glucose of 154. --An A1c of 8.5 correlates with an average blood glucose of 197. You can find an on-line A1c calculator at [email or web address].[employer]/. org/living-with-diabetes/treatment-and-care/blood-glucose-control/[email or web address] --Right hand x-ray normal--report attached. No arthritis noted on imaging. Please let me know if you have other questions.  
 
Rosaura Winters MD

## 2019-08-12 NOTE — PROGRESS NOTES
History of Present Illness:   Huy Hinson is a 46 y.o. male here for evaluation:    Chief Complaint   Patient presents with    Medication Evaluation     follow up      Here for BP follow-up. Notes:  Patient reports pain to all fingers (knuckles), occurs only when resting. He is taking Vit D and Vit B12 saupplement. He notes pain in PIP joints of both hands. He notes pain with bending fingers. He notes fingers stiff and painful in AM, but not during day. He notes takes some time to become normal in AM--some morning stiffness. Notes only pain/stiffness to PIP's bilat hands. Notes no other joint involvement. Has been having problems with his hands for 3wks. No changes in activity or meds in that time. He notes taking OTC meds--and helps. Takes Tylenol and helps. Takes in AM and helps with pain. He takes during day as needed also. Both hands similar pain. No history arthritis noted. Nursing screenings reviewed by provider at visit. Prior to Admission medications    Medication Sig Start Date End Date Taking? Authorizing Provider   ergocalciferol (ERGOCALCIFEROL) 50,000 unit capsule TAKE 1 CAP BY MOUTH EVERY SEVEN (7) DAYS FOR 8 DOSES. 8/6/19  Yes Provider, Historical   aspirin 81 mg chewable tablet TAKE 1 TABLET BY MOUTH EVERY DAY 6/13/19  Yes Merissa Hanson MD   cyanocobalamin 1,000 mcg tablet Take 1 Tab by mouth daily.  5/13/19  Yes Dago Hilliard MD   hydroCHLOROthiazide (HYDRODIURIL) 25 mg tablet TAKE 1 TABLET BY MOUTH EVERY DAY 5/6/19  Yes Merissa Hanson MD   atorvastatin (LIPITOR) 40 mg tablet TAKE 1 TABLET BY MOUTH EVERY DAY 5/6/19  Yes Merissa Hanson MD   amLODIPine (NORVASC) 10 mg tablet TAKE 1 TABLET BY MOUTH EVERY DAY 5/6/19  Yes Merissa Hanson MD        ROS    Vitals:    08/12/19 1403   BP: 137/81   Pulse: 77   Resp: 14   Temp: 97.9 °F (36.6 °C)   TempSrc: Oral   SpO2: 96%   Weight: 173 lb (78.5 kg)   Height: 5' 3\" (1.6 m) PainSc:   3   PainLoc: Finger      Body mass index is 30.65 kg/m². Physical Exam:     Physical Exam   Constitutional: He appears well-developed and well-nourished. No distress. HENT:   Head: Normocephalic and atraumatic. Eyes: Conjunctivae are normal. Right eye exhibits no discharge. Left eye exhibits no discharge. No scleral icterus. Cardiovascular: Normal rate, regular rhythm, normal heart sounds and intact distal pulses. Exam reveals no gallop and no friction rub. No murmur heard. Pulmonary/Chest: Effort normal and breath sounds normal. No respiratory distress. He has no wheezes. He has no rales. Abdominal: Soft. Bowel sounds are normal. He exhibits no distension. There is no tenderness. Musculoskeletal: He exhibits no edema, tenderness or deformity. No significant inflammation, synovitis noted bilat hands, particularly over PIP's (AM symptoms). Neurological: He is alert. He exhibits normal muscle tone. Coordination normal.   Skin: Skin is warm. No rash noted. He is not diaphoretic. No erythema. No pallor. Psychiatric: He has a normal mood and affect. His behavior is normal. Judgment and thought content normal.       Assessment and Plan:       ICD-10-CM ICD-9-CM    1. Low serum vitamin B12 E53.8 266.2 VITAMIN B12   2. Hypovitaminosis D E55.9 268.9 VITAMIN D, 25 HYDROXY   3. Bilateral hand pain M79.641 729.5 SED RATE (ESR)    M79.642  C REACTIVE PROTEIN, QT      ANTINUCLEAR ANTIBODIES, IFA      RA + CCP ABS      HEMOGLOBIN A1C WITH EAG      XR HAND RT MIN 3 V   4. Essential hypertension I10 401.9    5. Hypertriglyceridemia E78.1 272.1        1,2. Labs reviewed. 3.  Evaluation reviewed. 4,5:  Continue current mgt. Follow-up and Dispositions    · Return in about 3 months (around 11/12/2019), or if symptoms worsen or fail to improve, for Blood Pressure follow-up, fasting labs.        lab results and schedule of future lab studies reviewed with patient  reviewed medications and side effects in detail  radiology results and schedule of future radiology studies reviewed with patient    For additional documentation of information and/or recommendations discussed this visit, please see notes in instructions. Plan and evaluation (above) reviewed with pt at visit  Patient voiced understanding of plan and provided with time to ask/review questions. After Visit Summary (AVS) provided to pt after visit with additional instructions as needed/reviewed.

## 2019-08-12 NOTE — PROGRESS NOTES
RM 17    Patient reports pain to all fingers (knuckles), occurs only when resting. Chief Complaint   Patient presents with    Medication Evaluation     follow up      1. Have you been to the ER, urgent care clinic since your last visit? Hospitalized since your last visit? No    2. Have you seen or consulted any other health care providers outside of the [address] [place] [address] since your last visit? Include any pap smears or colon screening. No    There are no preventive care reminders to display for this patient. Abuse Screening Questionnaire 8/12/2019   Do you ever feel afraid of your partner? N   Are you in a relationship with someone who physically or mentally threatens you? N   Is it safe for you to go home?  Y     3 most recent PHQ Screens 8/12/2019   Little interest or pleasure in doing things Not at all   Feeling down, depressed, irritable, or hopeless Not at all   Total Score PHQ 2 0

## 2019-08-14 LAB
25(OH)D3+25(OH)D2 SERPL-MCNC: 38.9 NG/ML (ref 30–100)
ANA TITR SER IF: NEGATIVE {TITER}
CRP SERPL-MCNC: 1 MG/L (ref 0–10)
ERYTHROCYTE [SEDIMENTATION RATE] IN BLOOD BY WESTERGREN METHOD: 9 MM/HR (ref 0–15)
EST. AVERAGE GLUCOSE BLD GHB EST-MCNC: 123 MG/DL
HBA1C MFR BLD: 5.9 % (ref 4.8–5.6)
VIT B12 SERPL-MCNC: 1054 PG/ML (ref 232–1245)

## 2019-08-26 ENCOUNTER — DOCUMENTATION ONLY (OUTPATIENT)
Dept: INTERNAL MEDICINE CLINIC | Age: 39
End: 2019-08-26

## 2019-08-26 NOTE — PROGRESS NOTES
8/25/19 letter w/ lab results (8/12/19 and 9/29/18)  and xray results (8/12/19) mailed to pt's address on file.

## 2019-08-27 ENCOUNTER — HOSPITAL ENCOUNTER (OUTPATIENT)
Dept: SLEEP MEDICINE | Age: 39
Discharge: HOME | End: 2019-08-27
Payer: COMMERCIAL

## 2019-08-27 VITALS
HEART RATE: 91 BPM | BODY MASS INDEX: 30.65 KG/M2 | HEIGHT: 63 IN | SYSTOLIC BLOOD PRESSURE: 128 MMHG | OXYGEN SATURATION: 99 % | WEIGHT: 173 LBS | DIASTOLIC BLOOD PRESSURE: 79 MMHG

## 2019-08-27 DIAGNOSIS — G47.33 OSA (OBSTRUCTIVE SLEEP APNEA): ICD-10-CM

## 2019-08-27 PROCEDURE — 95811 POLYSOM 6/>YRS CPAP 4/> PARM: CPT | Performed by: INTERNAL MEDICINE

## 2019-08-29 ENCOUNTER — TELEPHONE (OUTPATIENT)
Dept: SLEEP MEDICINE | Age: 39
End: 2019-08-29

## 2019-08-29 NOTE — TELEPHONE ENCOUNTER
Titration study interpreted. * Device pressure change to CPAP  8 cmH2O by lead technologist either remotely or at PAP clinic (notify patient).     * PAP card download in 4 weeks.     * Office visit in 12 months or as needed.

## 2019-08-30 NOTE — TELEPHONE ENCOUNTER
CPAP pressure changed to 8 cm. Patient informed. Patient to return in 1 yr for follow up with download in 4 weeks.

## 2019-09-03 ENCOUNTER — HOSPITAL ENCOUNTER (EMERGENCY)
Age: 39
Discharge: HOME | End: 2019-09-03
Attending: EMERGENCY MEDICINE
Payer: COMMERCIAL

## 2019-09-03 VITALS
RESPIRATION RATE: 16 BRPM | HEART RATE: 59 BPM | DIASTOLIC BLOOD PRESSURE: 89 MMHG | TEMPERATURE: 97.8 F | SYSTOLIC BLOOD PRESSURE: 141 MMHG | OXYGEN SATURATION: 100 %

## 2019-09-03 DIAGNOSIS — M54.2 NECK PAIN: ICD-10-CM

## 2019-09-03 DIAGNOSIS — M62.838 MUSCLE SPASM: Primary | ICD-10-CM

## 2019-09-03 PROCEDURE — 74011250636 HC RX REV CODE- 250/636: Performed by: EMERGENCY MEDICINE

## 2019-09-03 PROCEDURE — 74011250637 HC RX REV CODE- 250/637: Performed by: EMERGENCY MEDICINE

## 2019-09-03 PROCEDURE — 99284 EMERGENCY DEPT VISIT MOD MDM: CPT

## 2019-09-03 PROCEDURE — 74011000250 HC RX REV CODE- 250: Performed by: EMERGENCY MEDICINE

## 2019-09-03 PROCEDURE — 96372 THER/PROPH/DIAG INJ SC/IM: CPT

## 2019-09-03 RX ORDER — CYCLOBENZAPRINE HCL 10 MG
10 TABLET ORAL
Qty: 15 TAB | Refills: 0 | Status: SHIPPED | OUTPATIENT
Start: 2019-09-03 | End: 2019-09-08

## 2019-09-03 RX ORDER — KETOROLAC TROMETHAMINE 30 MG/ML
30 INJECTION, SOLUTION INTRAMUSCULAR; INTRAVENOUS ONCE
Status: COMPLETED | OUTPATIENT
Start: 2019-09-03 | End: 2019-09-03

## 2019-09-03 RX ORDER — LIDOCAINE 50 MG/G
1 PATCH TOPICAL
Status: DISCONTINUED | OUTPATIENT
Start: 2019-09-03 | End: 2019-09-03 | Stop reason: HOSPADM

## 2019-09-03 RX ORDER — CYCLOBENZAPRINE HCL 10 MG
10 TABLET ORAL
Status: COMPLETED | OUTPATIENT
Start: 2019-09-03 | End: 2019-09-03

## 2019-09-03 RX ORDER — LIDOCAINE 4 G/100G
PATCH TOPICAL
Qty: 3 PATCH | Refills: 0 | Status: SHIPPED | OUTPATIENT
Start: 2019-09-03

## 2019-09-03 RX ORDER — NAPROXEN 500 MG/1
500 TABLET ORAL 2 TIMES DAILY WITH MEALS
Qty: 20 TAB | Refills: 0 | Status: SHIPPED | OUTPATIENT
Start: 2019-09-03 | End: 2019-09-13

## 2019-09-03 RX ADMIN — CYCLOBENZAPRINE HYDROCHLORIDE 10 MG: 10 TABLET, FILM COATED ORAL at 13:15

## 2019-09-03 RX ADMIN — KETOROLAC TROMETHAMINE 30 MG: 30 INJECTION, SOLUTION INTRAMUSCULAR at 13:16

## 2019-09-03 NOTE — ED PROVIDER NOTES
Patient is a 43-year-old previously healthy male who presents with left trapezius pain. Reports waking up with symptoms this morning. Pain over the left trapezius worse with movement. Denies headache, chest pain, back pain, palpitations, shortness of breath. No sore throat. No medications at home prior to arrival.             Past Medical History:   Diagnosis Date    Hepatitis B immune April 2014    sAb (+) after 2 doses vaccine. cAb and sAg negative. [facility] testing.  Hypertension     Immunity status testing April 2014    MMR and VZV immune. 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 IGRA (+) April 2014. CXR negative. Pt did not start therapy until Jan 2015. Pt report CXR repeated prior to 9mo INH started. INH delivered at [facility].  Stroke [facility]) 09/29/2018    Hospitalized with no residual deficits by Dec 2018 visit. Dr. Weber Every neurologist.   [facility] Visual problems--seen by ophth prior to June 2014 appt. 6/16/2014    Notes referred to eye specialist--glasses per pt report not able to help. History reviewed. No pertinent surgical history.       Family History:   Problem Relation Age of Onset    No Known Problems Mother     Hypertension Father        Social History     Socioeconomic History    Marital status: SINGLE     Spouse name: Not on file    Number of children: Not on file    Years of education: Not on file    Highest education level: Not on file   Occupational History    Not on file   Social Needs    Financial resource strain: Not on file    Food insecurity:     Worry: Not on file     Inability: Not on file    Transportation needs:     Medical: Not on file     Non-medical: Not on file   Tobacco Use    Smoking status: Former Smoker     Types: Cigarettes    Smokeless tobacco: Never Used   Substance and Sexual Activity    Alcohol use: No    Drug use: No    Sexual activity: Not Currently   Lifestyle    Physical activity:     Days per week: Not on file     Minutes per session: Not on file    Stress: Not on file   Relationships    Social connections:     Talks on phone: Not on file     Gets together: Not on file     Attends Nondenominational service: Not on file     Active member of club or organization: Not on file     Attends meetings of clubs or organizations: Not on file     Relationship status: Not on file    Intimate partner violence:     Fear of current or ex partner: Not on file     Emotionally abused: Not on file     Physically abused: Not on file     Forced sexual activity: Not on file   Other Topics Concern    Not on file   Social History Narrative    Not on file         ALLERGIES: Lisinopril    Review of Systems   Constitutional: Negative for chills and fever. HENT: Negative for drooling, nosebleeds, sore throat, tinnitus, trouble swallowing and voice change. Eyes: Negative for pain and itching. Respiratory: Negative for choking and stridor. Cardiovascular: Negative for leg swelling. Gastrointestinal: Negative for abdominal pain and rectal pain. Endocrine: Negative for heat intolerance and polyphagia. Genitourinary: Negative for enuresis and genital sores. Musculoskeletal: Positive for neck pain. Negative for arthralgias, joint swelling and neck stiffness. Skin: Negative for color change. Allergic/Immunologic: Negative for immunocompromised state. Neurological: Negative for tremors and speech difficulty. Hematological: Negative for adenopathy. Psychiatric/Behavioral: Negative for dysphoric mood and sleep disturbance. Vitals:    09/03/19 1107   Pulse: 63   SpO2: 99%            Physical Exam   Constitutional: He is oriented to person, place, and time. He appears well-developed and well-nourished. HENT:   Head: Normocephalic. Nose: Nose normal.   Eyes: Conjunctivae and EOM are normal.   Neck: Normal range of motion. Neck supple.    TTP along left lateral neck worse with movement. No cervical midline TTP. Cardiovascular: Regular rhythm and normal heart sounds. Pulmonary/Chest: Effort normal. No respiratory distress. Abdominal: Soft. He exhibits no distension. Musculoskeletal: Normal range of motion. He exhibits no deformity. Neurological: He is alert and oriented to person, place, and time. No cranial nerve deficit or sensory deficit. He exhibits normal muscle tone. Coordination normal.   Skin: Skin is warm and dry. Psychiatric: He has a normal mood and affect. His behavior is normal.   Nursing note and vitals reviewed. MDM  Number of Diagnoses or Management Options  Muscle spasm:   Neck pain:   Diagnosis management comments: Pt with symptoms consistent with torticollis. Trial of muscle relaxer and NSAIDs and reassess. No inciting trauma and no concern for fx, vessel injury. Symptom improvement with meds in ED. Stable for dc. Counseled on symptomatic care. Will FU w/PMD at end of the week. Patient's results have been reviewed with them. Patient and/or family have verbally conveyed their understanding and agreement of the patient's signs, symptoms, diagnosis, treatment and prognosis and additionally agree to follow up as recommended or return to the Emergency Room should their condition change prior to follow-up. Discharge instructions have also been provided to the patient with some educational information regarding their diagnosis as well a list of reasons why they would want to return to the ER prior to their follow-up appointment should their condition change.

## 2019-09-03 NOTE — DISCHARGE INSTRUCTIONS
The pain is likely due to muscle spasms. Please use heating pad to area. Take flexeril which is a muscle relaxer and naprosyn which is an anti-inflammatory. Good luck and thank you.

## 2019-09-12 ENCOUNTER — OFFICE VISIT (OUTPATIENT)
Dept: URGENT CARE | Age: 39
End: 2019-09-12

## 2019-09-12 VITALS
DIASTOLIC BLOOD PRESSURE: 81 MMHG | OXYGEN SATURATION: 99 % | BODY MASS INDEX: 29.77 KG/M2 | HEIGHT: 63 IN | TEMPERATURE: 98.1 F | SYSTOLIC BLOOD PRESSURE: 155 MMHG | RESPIRATION RATE: 18 BRPM | HEART RATE: 98 BPM | WEIGHT: 168 LBS

## 2019-09-12 DIAGNOSIS — M62.838 TRAPEZIUS MUSCLE SPASM: Primary | ICD-10-CM

## 2019-09-12 NOTE — PROGRESS NOTES
47 yo male here for return of right neck soreness  Seen in ED 9 days ago  Did not seem vascular at time and was dignosed with spasm prescribed naproxen and muscle relaxant  Took as prescribed and said it helped. He had tightness return 2 days ago. Pain 3/10 sore worse with moving neck and with turning head. Denies any radiating symptoms, headache, vision change, nausea, dizziness, weakness/numbness or tingling of extremities and has full strength of arms. Hx significant for vertebrobasilar artery syndrope, HTN, TIA           Past Medical History:   Diagnosis Date    Hepatitis B immune April 2014    sAb (+) after 2 doses vaccine. cAb and sAg negative. [facility] testing.  Hypertension     Immunity status testing April 2014    MMR and VZV immune. 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 IGRA (+) April 2014. CXR negative. Pt did not start therapy until Jan 2015. Pt report CXR repeated prior to 9mo INH started. INH delivered at [facility].  Stroke [facility]) 09/29/2018    Hospitalized with no residual deficits by Dec 2018 visit. Dr. Destiny Mendez neurologist.   [place] Visual problems--seen by ophth prior to June 2014 appt. 6/16/2014    Notes referred to eye specialist--glasses per pt report not able to help. History reviewed. No pertinent surgical history.       Family History   Problem Relation Age of Onset    No Known Problems Mother     Hypertension Father         Social History     Socioeconomic History    Marital status: SINGLE     Spouse name: Not on file    Number of children: Not on file    Years of education: Not on file    Highest education level: Not on file   Occupational History    Not on file   Social Needs    Financial resource strain: Not on file    Food insecurity:     Worry: Not on file     Inability: Not on file    Transportation needs:     Medical: Not on file     Non-medical: Not on file   Tobacco Use    Smoking status: Former Smoker     Types: Cigarettes    Smokeless tobacco: Never Used   Substance and Sexual Activity    Alcohol use: No    Drug use: No    Sexual activity: Not Currently   Lifestyle    Physical activity:     Days per week: Not on file     Minutes per session: Not on file    Stress: Not on file   Relationships    Social connections:     Talks on phone: Not on file     Gets together: Not on file     Attends Sikh service: Not on file     Active member of club or organization: Not on file     Attends meetings of clubs or organizations: Not on file     Relationship status: Not on file    Intimate partner violence:     Fear of current or ex partner: Not on file     Emotionally abused: Not on file     Physically abused: Not on file     Forced sexual activity: Not on file   Other Topics Concern    Not on file   Social History Narrative    Not on file                ALLERGIES: Lisinopril    Review of Systems   Neurological: Negative for dizziness, syncope, facial asymmetry, weakness, light-headedness, numbness and headaches. Psychiatric/Behavioral: Negative for confusion. All other systems reviewed and are negative. Vitals:    09/12/19 0840   BP: 155/81   Pulse: 98   Resp: 18   Temp: 98.1 °F (36.7 °C)   SpO2: 99%   Weight: 168 lb (76.2 kg)   Height: 5' 3\" (1.6 m)       Physical Exam   Constitutional: He is oriented to person, place, and time. No distress. HENT:   Head: Normocephalic and atraumatic. Mouth/Throat: Oropharynx is clear and moist.   Eyes: Pupils are equal, round, and reactive to light. Conjunctivae and EOM are normal.   Neck: Normal range of motion. Neck supple. No tracheal deviation present. No thyromegaly present. No neck swelling. Cardiovascular: Normal rate, regular rhythm and normal heart sounds. Exam reveals no gallop and no friction rub. No murmur heard. No carotid bruits bilat.    Pulmonary/Chest: Effort normal and breath sounds normal. No respiratory distress. He has no wheezes. He has no rales. Musculoskeletal:        Back:    Arm strength,  strength of UE equal bilat with full AROM. Lymphadenopathy:     He has no cervical adenopathy. Neurological: He is alert and oriented to person, place, and time. No cranial nerve deficit. He exhibits normal muscle tone. Coordination normal.   Ambulating normally    Skin: Skin is warm and dry. No rash noted. He is not diaphoretic. Psychiatric: He has a normal mood and affect. His behavior is normal. Thought content normal.       MDM     Differential Diagnosis; Clinical Impression; Plan:       CLINICAL IMPRESSION:  (F64.069) Trapezius muscle spasm  (primary encounter diagnosis)      Plan:  No concerning findings on exam.   Distinctly isolated trapezius tenderness; this does not seem vascular. Continue current medications, once naproxen prescription is out you may switch to over the counter naproxen, take with food. Warm compresses  Gentle massage  Gentle neck stretching  Follow up with orthopedics or your primary care doctor if not improved over next 1 week. We have reviewed concerning signs/symptoms, normal vs abnormal progression of medical condition and when to seek immediate medical attention. Schedule with PCP or Urgent Care immediately for worsening or new symptoms. See your PCP or ORTHO if there is not at least some improvement in symptoms within the next 7 days. You should see your PCP for updates on your routine health maintenance.              Procedures

## 2019-09-12 NOTE — LETTER
NOTIFICATION RETURN TO WORK / SCHOOL 
 
9/12/2019 8:57 AM 
 
Mr. Harsha Nation [address] [facility] [address] To Whom It May Concern: 
 
Harsha Nation is currently under the care of [address]. He will return to work/school on: 09/13 OR 09/14/2019 If there are questions or concerns please have the patient contact our office. Sincerely, E PROVIDER

## 2019-09-12 NOTE — PATIENT INSTRUCTIONS
Continue current medications, once naproxen prescription is out you may switch to over the counter naproxen, take with food. Warm compresses  Gentle massage  Gentle neck stretching  Follow up with orthopedics or your primary care doctor if not improved over next 1 week. Neck Spasm: Care Instructions  Your Care Instructions  A neck spasm is sudden tightness and pain in your neck muscles. A spasm may be caused by some activities or repeated movements. For example, you may be more likely to have a neck spasm if you slouch, paint a ceiling, work at a computer, or sleep with your neck twisted. But the cause isn't always clear. Home treatment includes using heat or ice, taking over-the-counter (OTC) pain medicines, and avoiding activities that may lead to neck pain. Gentle stretching, or treatments such as massage or manipulation, may also help ease a neck spasm. For a neck spasm that doesn't get better with home care, your doctor may prescribe medicine. He or she may also suggest exercise or physical therapy to help strengthen or relax your neck muscles. Follow-up care is a key part of your treatment and safety. Be sure to make and go to all appointments, and call your doctor if you are having problems. It's also a good idea to know your test results and keep a list of the medicines you take. How can you care for yourself at home? · To relieve pain, use heat or ice (whichever feels better) on the affected area. ? Put a warm water bottle, a heating pad set on low, or a warm cloth on your neck. Put a thin cloth between the heating pad and your skin. Do not go to sleep with a heating pad on your skin. ? Try ice or a cold pack on the area for 10 to 20 minutes at a time. Put a thin cloth between the ice and your skin. · Ask your doctor if you can take acetaminophen (such as Tylenol) or nonsteroidal anti-inflammatory drugs, such as ibuprofen or naproxen.  Your doctor can prescribe stronger medicines if needed. Be safe with medicines. Read and follow all instructions on the label. · Stretch your muscles every day, especially before and after exercise and at bedtime. Regular stretching can help relax your muscles. · Try to find a pillow and a position in bed that help improve your night's rest.  · Try to stay active. It's best to start activity slowly. If an exercise makes your pain worse, stop doing it. When should you call for help? Call 911 anytime you think you may need emergency care. For example, call if:    · You are unable to move an arm or a leg at all.   [facility] your doctor now or seek immediate medical care if:    · You have new or worse symptoms in your arms, legs, belly, or buttocks. Symptoms may include:  ? Numbness or tingling. ? Weakness. ? Pain.     · You lose bladder or bowel control.    Watch closely for changes in your health, and be sure to contact your doctor if:    · You do not get better as expected. Where can you learn more? Go to http://joby-carter.info/. Enter X395 in the search box to learn more about \"Neck Spasm: Care Instructions. \"  Current as of: September 20, 2018  Content Version: 12.1  © 6090-6257 Healthwise, [employer]. Care instructions adapted under license by [employer] (which disclaims liability or warranty for this information). If you have questions about a medical condition or this instruction, always ask your healthcare professional. [place] [address] any warranty or liability for your use of this information.

## 2019-09-17 RX ORDER — AMLODIPINE BESYLATE 10 MG/1
TABLET ORAL
Qty: 90 TAB | Refills: 1 | Status: SHIPPED | OUTPATIENT
Start: 2019-09-17 | End: 2020-05-26 | Stop reason: SDUPTHER

## 2019-09-27 ENCOUNTER — TELEPHONE (OUTPATIENT)
Dept: SLEEP MEDICINE | Age: 39
End: 2019-09-27

## 2019-09-27 NOTE — TELEPHONE ENCOUNTER
Review of download indicated patient was not using. Phoned patient who stated he has returned PAP machine.

## 2019-10-15 ENCOUNTER — OFFICE VISIT (OUTPATIENT)
Dept: INTERNAL MEDICINE CLINIC | Age: 39
End: 2019-10-15

## 2019-10-15 VITALS
OXYGEN SATURATION: 98 % | BODY MASS INDEX: 30.33 KG/M2 | WEIGHT: 171.2 LBS | HEIGHT: 63 IN | TEMPERATURE: 98.1 F | DIASTOLIC BLOOD PRESSURE: 73 MMHG | HEART RATE: 76 BPM | RESPIRATION RATE: 15 BRPM | SYSTOLIC BLOOD PRESSURE: 133 MMHG

## 2019-10-15 DIAGNOSIS — R55 MICTURITION SYNCOPE: ICD-10-CM

## 2019-10-15 DIAGNOSIS — R42 DIZZINESS: ICD-10-CM

## 2019-10-15 DIAGNOSIS — M79.642 HAND PAIN, LEFT: Primary | ICD-10-CM

## 2019-10-15 DIAGNOSIS — Z23 ENCOUNTER FOR IMMUNIZATION: ICD-10-CM

## 2019-10-15 LAB
BILIRUB UR QL STRIP: NEGATIVE
GLUCOSE UR-MCNC: NEGATIVE MG/DL
KETONES P FAST UR STRIP-MCNC: NEGATIVE MG/DL
PH UR STRIP: 7 [PH] (ref 4.6–8)
PROT UR QL STRIP: ABNORMAL
SP GR UR STRIP: 1.02 (ref 1–1.03)
UA UROBILINOGEN AMB POC: ABNORMAL (ref 0.2–1)
URINALYSIS CLARITY POC: CLEAR
URINALYSIS COLOR POC: YELLOW
URINE BLOOD POC: ABNORMAL
URINE LEUKOCYTES POC: NEGATIVE
URINE NITRITES POC: NEGATIVE

## 2019-10-15 NOTE — PROGRESS NOTES
CHANA Barry is a 46 y.o. male, he presents today for:    Left hand pain in 3rd finger. Morning is more pain than at night. Sees some small swelling. Did have pain in right hand, but that   Removes linens from bed. Finger won't fully bend down. No injury to finger nor hand. No rashes, no new fevers, no new changes in digestion. For 2-3 weeks. PMH/PSH: reviewed and updated  Sochx:  reports that he has quit smoking. His smoking use included cigarettes. He has never used smokeless tobacco. He reports that he does not drink alcohol or use drugs. Famhx: reviewed and updated     All: Allergies   Allergen Reactions    Lisinopril Angioedema     Lips had tingling/numbness     Med:   Current Outpatient Medications   Medication Sig    amLODIPine (NORVASC) 10 mg tablet TAKE 1 TABLET BY MOUTH EVERY DAY    lidocaine (LIDOCAINE PAIN RELIEF) 4 % patch Apply to affected area as prescribed.  cholecalciferol (VITAMIN D3) 2,000 unit cap capsule Take 2,000 Units by mouth daily. Take instead of weekly dose (50,000 units).  aspirin 81 mg chewable tablet TAKE 1 TABLET BY MOUTH EVERY DAY    cyanocobalamin 1,000 mcg tablet Take 1 Tab by mouth daily.  hydroCHLOROthiazide (HYDRODIURIL) 25 mg tablet TAKE 1 TABLET BY MOUTH EVERY DAY    atorvastatin (LIPITOR) 40 mg tablet TAKE 1 TABLET BY MOUTH EVERY DAY     No current facility-administered medications for this visit. ROS    PE:  Blood pressure 133/73, pulse 76, temperature 98.1 °F (36.7 °C), temperature source Oral, resp. rate 15, height 5' 3\" (1.6 m), weight 171 lb 3.2 oz (77.7 kg), SpO2 98 %. Body mass index is 30.33 kg/m². Physical Exam  Constitutional:       General: He is not in acute distress. Appearance: He is well-developed. He is not diaphoretic. HENT:      Head: Normocephalic and atraumatic. Eyes:      General: No scleral icterus. Right eye: No discharge. Left eye: No discharge.       Conjunctiva/sclera: Conjunctivae normal.   Cardiovascular:      Rate and Rhythm: Normal rate and regular rhythm. Heart sounds: Normal heart sounds. No murmur. No friction rub. No gallop. Pulmonary:      Effort: Pulmonary effort is normal. No respiratory distress. Breath sounds: Normal breath sounds. No wheezing or rales. Abdominal:      General: Bowel sounds are normal. There is no distension. Palpations: Abdomen is soft. Tenderness: There is no tenderness. Musculoskeletal:         General: No tenderness. Skin:     General: Skin is warm. Coloration: Skin is not pale. Findings: No erythema or rash. Neurological:      Mental Status: He is alert. Motor: No abnormal muscle tone. Coordination: Coordination normal.   Psychiatric:         Behavior: Behavior normal.         Thought Content: Thought content normal.         Judgment: Judgment normal.       Labs:   Results for orders placed or performed in visit on 10/15/19   URINALYSIS W/ RFLX MICROSCOPIC   Result Value Ref Range    Specific Gravity 1.018 1.005 - 1.030    pH (UA) 6.5 5.0 - 7.5    Color Yellow Yellow    Appearance Clear Clear    Leukocyte Esterase Negative Negative    Protein Trace Negative/Trace    Glucose Negative Negative    Ketone Negative Negative    Blood 2+ (A) Negative    Bilirubin Negative Negative    Urobilinogen 0.2 0.2 - 1.0 mg/dL    Nitrites Negative Negative    Microscopic Examination See additional order     Narrative    Performed at:  [address]  [id number]  : Karyna Starr MD, Phone:  [phone]   MICROSCOPIC EXAMINATION   Result Value Ref Range    WBC 0-5 0 - 5 /hpf    RBC 11-30 (A) 0 - 2 /hpf    Epithelial cells None seen 0 - 10 /hpf    Casts None seen None seen /lpf    Mucus Present Not Estab.     Bacteria None seen None seen/Few    Narrative    Performed at:  [address]  [id number]  : Karyna Starr MD, Phone:  [phone]   AMB POC URINALYSIS DIP STICK AUTO W/O MICRO   Result Value Ref Range    Color (UA POC) Yellow     Clarity (UA POC) Clear     Glucose (UA POC) Negative Negative    Bilirubin (UA POC) Negative Negative    Ketones (UA POC) Negative Negative    Specific gravity (UA POC) 1.020 1.001 - 1.035    Blood (UA POC) 2+ Negative    pH (UA POC) 7 4.6 - 8.0    Protein (UA POC) Trace Negative    Urobilinogen (UA POC) 0.2 mg/dL 0.2 - 1    Nitrites (UA POC) Negative Negative    Leukocyte esterase (UA POC) Negative Negative       A/P:  46 y.o. male    ICD-10-CM ICD-9-CM    1. Hand pain, left M79.642 729.5 XR HAND LT MIN 3 V   2. Dizziness R42 780.4 AMB POC URINALYSIS DIP STICK AUTO W/O MICRO   3. Encounter for immunization Z23 V03.89 INFLUENZA VIRUS VAC QUAD,SPLIT,PRESV FREE SYRINGE IM   4. Micturition syncope R55 780.2 URINALYSIS W/ RFLX MICROSCOPIC     Left hand pain: without clear defromity. X-ray requested for further evalatino. Dizzy episode associated with urination only. Has happened 2 times in life. Last occurred > 2 weeks ago. disucssed continuing to support good hydration and sleep. Suggested sitting with urination. With hematuria: advised to follow-up with urology. Flu vaccine: discussed risks and benefits, answered questions, agreed to vaccine. - He was given AVS and expressed understanding with the diagnosis and plan as discussed.     Future Appointments   Date Time Provider Spring [place]   11/12/2019  9:45 AM Cuco Stover MD [address] [facility]

## 2019-10-15 NOTE — PATIENT INSTRUCTIONS
For dizziness when peeing. I recommend sitting down to pee. Avoid allowing your bladder to become very full. Urinate more frequently  Be sure to stay well hydrated. There was a possible blood in your urine today. We are sending a better test to the lab. If this is possible for blood I will refer you to the urologist.     Hand Arthritis: Exercises  Introduction  Here are some examples of exercises for you to try. The exercises may be suggested for a condition or for rehabilitation. Start each exercise slowly. Ease off the exercises if you start to have pain. You will be told when to start these exercises and which ones will work best for you. How to do the exercises  Tendon glides    1. In this exercise, the steps follow one another to a make a continuous movement. 2. With your affected hand, point your fingers and thumb straight up. Your wrist should be relaxed, following the line of your fingers and thumb. 3. Curl your fingers so that the top two joints in them are bent, and your fingers wrap down. Your fingertips should touch or be near the base of your fingers. Your fingers will look like a hook. 4. Make a fist by bending your knuckles. Your thumb can gently rest against your index (pointing) finger. 5. Unwind your fingers slightly so that your fingertips can touch the base of your palm. Your thumb can rest against your index finger. 6. Move back to your starting position, with your fingers and thumb pointing up. 7. Repeat the series of motions 8 to 12 times. 8. Switch hands and repeat steps 1 through 6, even if only one hand is sore. Intrinsic flexion    1. Rest your affected hand on a table and bend the large joints where your fingers connect to your hand. Keep your thumb and the other joints in your fingers straight. 2. Slowly straighten your fingers. Your wrist should be relaxed, following the line of your fingers and thumb.   3. Move back to your starting position, with your hand bent.  4. Repeat 8 to 12 times. 5. Switch hands and repeat steps 1 through 4, even if only one hand is sore. Finger extension    1. Place your affected hand flat on a table. 2. Lift and then lower one finger at a time off the table. 3. Repeat 8 to 12 times. 4. Switch hands and repeat steps 1 through 3, even if only one hand is sore. MP extension    1. Place your good hand on a table, palm up. Put your affected hand on top of your good hand with your fingers wrapped around the thumb of your good hand like you are making a fist.  2. Slowly uncurl the joints of your affected hand where your fingers connect to your hand so that only the top two joints of your fingers are bent. Your fingers will look like a hook. 3. Move back to your starting position, with your fingers wrapped around your good thumb. 4. Repeat 8 to 12 times. 5. Switch hands and repeat steps 1 through 4, even if only one hand is sore. PIP extension (with MP extension)    1. Place your good hand on a table, palm up. Put your affected hand on top of your good hand, palm up. 2. Use the thumb and fingers of your good hand to grasp below the middle joint of one finger of your affected hand. 3. Straighten the last two joints of that finger. 4. Repeat 8 to 12 times. 5. Repeat steps 1 through 4 with each finger. 6. Switch hands and repeat steps 1 through 5, even if only one hand is sore. DIP flexion    1. With your good hand, grasp one finger of your affected hand. Your thumb will be on the top side of your finger just below the joint that is closest to your fingernail. 2. Slowly bend your affected finger only at the joint closest to your fingernail. 3. Repeat 8 to 12 times. 4. Repeat steps 1 through 3 with each finger. 5. Switch hands and repeat steps 1 through 4, even if only one hand is sore. Follow-up care is a key part of your treatment and safety.  Be sure to make and go to all appointments, and call your doctor if you are having problems. It's also a good idea to know your test results and keep a list of the medicines you take. Where can you learn more? Go to http://joby-carter.info/. Enter V797 in the search box to learn more about \"Hand Arthritis: Exercises. \"  Current as of: June 26, 2019  Content Version: 12.2  © 3591-6662 [employer], [employer]. Care instructions adapted under license by [employer] (which disclaims liability or warranty for this information). If you have questions about a medical condition or this instruction, always ask your healthcare professional. [place] [address] any warranty or liability for your use of this information.

## 2019-10-15 NOTE — PROGRESS NOTES
RM 1    Chief Complaint   Patient presents with    Finger Pain     reports decreased movement and pain in middle finger of left hand, had x ray done recently patient reports, worse in mornings and a night patient reports     Dizziness     reports when uses bathroom in morning or evening feels dizzy      1. Have you been to the ER, urgent care clinic since your last visit? Hospitalized since your last visit? No    2. Have you seen or consulted any other health care providers outside of the [address] [place] [address] since your last visit? Include any pap smears or colon screening. No    There are no preventive care reminders to display for this patient.     Learning Assessment 2/7/2019   PRIMARY LEARNER Patient   BARRIERS PRIMARY LEARNER NONE   PRIMARY LANGUAGE OTHER (COMMENT)   LEARNER PREFERENCE PRIMARY LISTENING   ANSWERED BY patient   RELATIONSHIP SELF

## 2019-10-16 ENCOUNTER — TELEPHONE (OUTPATIENT)
Dept: INTERNAL MEDICINE CLINIC | Age: 39
End: 2019-10-16

## 2019-10-16 DIAGNOSIS — R31.21 ASYMPTOMATIC MICROSCOPIC HEMATURIA: Primary | ICD-10-CM

## 2019-10-16 LAB
APPEARANCE UR: CLEAR
BACTERIA #/AREA URNS HPF: ABNORMAL /[HPF]
BILIRUB UR QL STRIP: NEGATIVE
CASTS URNS QL MICRO: ABNORMAL /LPF
COLOR UR: YELLOW
EPI CELLS #/AREA URNS HPF: ABNORMAL /HPF (ref 0–10)
GLUCOSE UR QL: NEGATIVE
HGB UR QL STRIP: ABNORMAL
KETONES UR QL STRIP: NEGATIVE
LEUKOCYTE ESTERASE UR QL STRIP: NEGATIVE
MICRO URNS: ABNORMAL
MUCOUS THREADS URNS QL MICRO: PRESENT
NITRITE UR QL STRIP: NEGATIVE
PH UR STRIP: 6.5 [PH] (ref 5–7.5)
PROT UR QL STRIP: ABNORMAL
RBC #/AREA URNS HPF: ABNORMAL /HPF (ref 0–2)
SP GR UR: 1.02 (ref 1–1.03)
UROBILINOGEN UR STRIP-MCNC: 0.2 MG/DL (ref 0.2–1)
WBC #/AREA URNS HPF: ABNORMAL /HPF (ref 0–5)

## 2019-10-16 NOTE — TELEPHONE ENCOUNTER
paient with isolated hematuria. No protein. Please all and advise to follow-up with urology for evaluation. Referral as writen.  Please fax with lab results to urology    Barbara Esparza MD

## 2019-10-17 NOTE — TELEPHONE ENCOUNTER
Faxed lab results and referral to [facility] as requested. I called patient and advised him of the lab results and gave him the referral information so he can call and schedule an appointment.  Patient stated understanding and will call to schedule himself an appointment

## 2019-10-22 ENCOUNTER — HOSPITAL ENCOUNTER (OUTPATIENT)
Dept: GENERAL RADIOLOGY | Age: 39
Discharge: HOME | End: 2019-10-22

## 2019-10-22 DIAGNOSIS — M79.642 HAND PAIN, LEFT: ICD-10-CM

## 2019-10-29 ENCOUNTER — HOSPITAL ENCOUNTER (OUTPATIENT)
Dept: GENERAL RADIOLOGY | Age: 39
Discharge: HOME | End: 2019-10-29
Payer: COMMERCIAL

## 2019-10-29 DIAGNOSIS — Z02.1 PHYSICAL EXAM, PRE-EMPLOYMENT: ICD-10-CM

## 2019-10-29 PROCEDURE — 71046 X-RAY EXAM CHEST 2 VIEWS: CPT

## 2019-11-10 RX ORDER — HYDROCHLOROTHIAZIDE 25 MG/1
TABLET ORAL
Qty: 90 TAB | Refills: 1 | Status: SHIPPED | OUTPATIENT
Start: 2019-11-10 | End: 2020-05-26 | Stop reason: SDUPTHER

## 2019-11-10 RX ORDER — ATORVASTATIN CALCIUM 40 MG/1
TABLET, FILM COATED ORAL
Qty: 90 TAB | Refills: 1 | Status: SHIPPED | OUTPATIENT
Start: 2019-11-10 | End: 2020-05-26 | Stop reason: SDUPTHER

## 2019-11-11 NOTE — TELEPHONE ENCOUNTER
Refill request(s) approved--HCTZ, atorvastatin.     Future Appointments   Date Time Provider Spring [place]   11/12/2019  9:45 AM Dago Hudson MD [address] [facility]

## 2019-12-24 ENCOUNTER — OFFICE VISIT (OUTPATIENT)
Dept: INTERNAL MEDICINE CLINIC | Age: 39
End: 2019-12-24

## 2019-12-24 VITALS
WEIGHT: 169.13 LBS | SYSTOLIC BLOOD PRESSURE: 131 MMHG | DIASTOLIC BLOOD PRESSURE: 80 MMHG | HEART RATE: 70 BPM | TEMPERATURE: 97.5 F | OXYGEN SATURATION: 100 % | BODY MASS INDEX: 29.97 KG/M2 | RESPIRATION RATE: 16 BRPM | HEIGHT: 63 IN

## 2019-12-24 DIAGNOSIS — E78.1 HYPERTRIGLYCERIDEMIA: ICD-10-CM

## 2019-12-24 DIAGNOSIS — E55.9 HYPOVITAMINOSIS D: ICD-10-CM

## 2019-12-24 DIAGNOSIS — R73.09 ELEVATED HEMOGLOBIN A1C: Primary | ICD-10-CM

## 2019-12-24 DIAGNOSIS — Z76.89 SLEEP CONCERN: ICD-10-CM

## 2019-12-24 DIAGNOSIS — I10 ESSENTIAL HYPERTENSION: ICD-10-CM

## 2019-12-24 LAB
GLUCOSE POC: 93 MG/DL (ref 70–110)
HBA1C MFR BLD HPLC: 5.5 %

## 2019-12-24 NOTE — PROGRESS NOTES
RM 17    Patient reports working [employer] for the past month, reports difficulty sleeping at night for the past 3 days. Patient would like blood glucose checked in office. Chief Complaint   Patient presents with    Blood Pressure Check     follow up     Labs     Patient fasting. 1. Have you been to the ER, urgent care clinic since your last visit? Hospitalized since your last visit? No    2. Have you seen or consulted any other health care providers outside of the [address] [place] [address] since your last visit? Include any pap smears or colon screening. Yes Reason for visit:  11/21/19, [facility], CT scan    There are no preventive care reminders to display for this patient. Abuse Screening Questionnaire 12/24/2019   Do you ever feel afraid of your partner? N   Are you in a relationship with someone who physically or mentally threatens you? N   Is it safe for you to go home?  Y     3 most recent PHQ Screens 12/24/2019   Little interest or pleasure in doing things Not at all   Feeling down, depressed, irritable, or hopeless Not at all   Total Score PHQ 2 0

## 2019-12-24 NOTE — PATIENT INSTRUCTIONS
1.  If difficulty sleeping due to pain, take Tylenol (acetaminophen) 500mg or 650mg prior to going to bed. Do not drink alcohol with acetaminophen. 2.  If problems getting to sleep or staying asleep, you can use melatonin 5mg or 10mg 30-60minutes prior to bed. You could also use Benadryl (diphenhydramine) 12.5mg or 25mg 30-60 minutes prior to bed instead. You can use either of the above sleep medications with the acetaminophen if needed. 3.   
Results for orders placed or performed in visit on 12/24/19 AMB POC HEMOGLOBIN A1C Result Value Ref Range Hemoglobin A1c (POC) 5.5 % AMB POC GLUCOSE, QUANTITATIVE, BLOOD Result Value Ref Range Glucose POC 93 70 - 110 mg/dL The hemoglobin A1c value above correlates with an average blood sugar/glucose of 111. The fasting blood sugar is normal. 
The A1c has improved from prior--keep up the good work!

## 2019-12-24 NOTE — PROGRESS NOTES
History of Present Illness:   Sabi Magallon is a 46 y.o. male here for evaluation:    Chief Complaint   Patient presents with    Blood Pressure Check     follow up     Labs     Patient fasting. Notes (nursing/rooming note copied below in italics):  Patient reports working 3-11 for the past month, reports difficulty sleeping at night for the past 3 days. Patient would like blood glucose checked in office. Reviewed lab results and other labs at visit. No problems noted with meds. Nursing screenings reviewed by provider at visit. Prior to Admission medications    Medication Sig Start Date End Date Taking? Authorizing Provider   atorvastatin (LIPITOR) 40 mg tablet TAKE 1 TABLET BY MOUTH EVERY DAY 11/10/19  Yes Viviana Srivastava MD   hydroCHLOROthiazide (HYDRODIURIL) 25 mg tablet TAKE 1 TABLET BY MOUTH EVERY DAY 11/10/19  Yes Viviana Srivastava MD   amLODIPine (NORVASC) 10 mg tablet TAKE 1 TABLET BY MOUTH EVERY DAY 9/17/19  Yes Viviana Srivastava MD   lidocaine (LIDOCAINE PAIN RELIEF) 4 % patch Apply to affected area as prescribed. 9/3/19  Yes Briana Shahid MD   cholecalciferol (VITAMIN D3) 2,000 unit cap capsule Take 2,000 Units by mouth daily. Take instead of weekly dose (50,000 units). 9/3/19  Yes Viviana Srivastava MD   aspirin 81 mg chewable tablet TAKE 1 TABLET BY MOUTH EVERY DAY 6/13/19  Yes Viviana Srivastava MD   cyanocobalamin 1,000 mcg tablet Take 1 Tab by mouth daily. 5/13/19  Yes Echo Teresa MD        ROS    Vitals:    12/24/19 1110   BP: 131/80   Pulse: 70   Resp: 16   Temp: 97.5 °F (36.4 °C)   TempSrc: Oral   SpO2: 100%   Weight: 169 lb 2 oz (76.7 kg)   Height: 5' 3\" (1.6 m)   PainSc:   0 - No pain      Body mass index is 29.96 kg/m². Physical Exam:     Physical Exam  Vitals signs and nursing note reviewed. Constitutional:       General: He is not in acute distress. Appearance: Normal appearance. He is well-developed.  He is not diaphoretic. HENT:      Head: Normocephalic and atraumatic. Mouth/Throat:      Mouth: Mucous membranes are moist.   Eyes:      General: No scleral icterus. Right eye: No discharge. Left eye: No discharge. Conjunctiva/sclera: Conjunctivae normal.   Cardiovascular:      Rate and Rhythm: Normal rate and regular rhythm. Pulses: Normal pulses. Heart sounds: Normal heart sounds. No murmur. No friction rub. No gallop. Pulmonary:      Effort: Pulmonary effort is normal. No respiratory distress. Breath sounds: Normal breath sounds. No stridor. No wheezing, rhonchi or rales. Abdominal:      General: Bowel sounds are normal. There is no distension. Palpations: Abdomen is soft. Tenderness: There is no tenderness. There is no guarding or rebound. Musculoskeletal:         General: No swelling, tenderness or deformity. Skin:     General: Skin is warm. Coloration: Skin is not jaundiced or pale. Findings: No bruising, erythema or rash. Neurological:      General: No focal deficit present. Mental Status: He is alert. Motor: No abnormal muscle tone. Coordination: Coordination normal.      Gait: Gait normal.   Psychiatric:         Mood and Affect: Mood normal.         Behavior: Behavior normal.         Thought Content: Thought content normal.         Judgment: Judgment normal.       Results for orders placed or performed in visit on 12/24/19   AMB POC HEMOGLOBIN A1C   Result Value Ref Range    Hemoglobin A1c (POC) 5.5 %   AMB POC GLUCOSE, QUANTITATIVE, BLOOD   Result Value Ref Range    Glucose POC 93 70 - 110 mg/dL       Assessment and Plan:       ICD-10-CM ICD-9-CM    1. Elevated hemoglobin A1c R73.09 790.29 AMB POC HEMOGLOBIN A1C      AMB POC GLUCOSE, QUANTITATIVE, BLOOD   2. Hypovitaminosis D E55.9 268.9 VITAMIN D, 25 HYDROXY   3. Essential hypertension G88 896.2 METABOLIC PANEL, COMPREHENSIVE      CBC WITH AUTOMATED DIFF   4. Hypertriglyceridemia V36.7 984.4 METABOLIC PANEL, COMPREHENSIVE      LIPID PANEL      CK   5. Sleep concern Z76.89 V69.4        1. Labs reviewed at visit. 2-4:  Repeat fasting labs reviewed. 5.  Reviewed as per instructions. Follow-up and Dispositions    · Return in about 3 months (around 3/24/2020), or if symptoms worsen or fail to improve, for Blood Pressure follow-up.       lab results and schedule of future lab studies reviewed with patient  reviewed diet, exercise and weight control  reviewed medications and side effects in detail    For additional documentation of information and/or recommendations discussed this visit, please see notes in instructions. Plan and evaluation (above) reviewed with pt at visit  Patient voiced understanding of plan and provided with time to ask/review questions. After Visit Summary (AVS) provided to pt after visit with additional instructions as needed/reviewed. No future appointments.

## 2019-12-25 LAB
25(OH)D3+25(OH)D2 SERPL-MCNC: 22.7 NG/ML (ref 30–100)
ALBUMIN SERPL-MCNC: 5 G/DL (ref 3.5–5.5)
ALBUMIN/GLOB SERPL: 1.7 {RATIO} (ref 1.2–2.2)
ALP SERPL-CCNC: 71 IU/L (ref 39–117)
ALT SERPL-CCNC: 43 IU/L (ref 0–44)
AST SERPL-CCNC: 28 IU/L (ref 0–40)
BASOPHILS # BLD AUTO: 0.1 X10E3/UL (ref 0–0.2)
BASOPHILS NFR BLD AUTO: 1 %
BILIRUB SERPL-MCNC: 1 MG/DL (ref 0–1.2)
BUN SERPL-MCNC: 9 MG/DL (ref 6–20)
BUN/CREAT SERPL: 9 (ref 9–20)
CALCIUM SERPL-MCNC: 10.1 MG/DL (ref 8.7–10.2)
CHLORIDE SERPL-SCNC: 98 MMOL/L (ref 96–106)
CHOLEST SERPL-MCNC: 174 MG/DL (ref 100–199)
CK SERPL-CCNC: 81 U/L (ref 24–204)
CO2 SERPL-SCNC: 23 MMOL/L (ref 20–29)
CREAT SERPL-MCNC: 1.01 MG/DL (ref 0.76–1.27)
EOSINOPHIL # BLD AUTO: 0.2 X10E3/UL (ref 0–0.4)
EOSINOPHIL NFR BLD AUTO: 3 %
ERYTHROCYTE [DISTWIDTH] IN BLOOD BY AUTOMATED COUNT: 12.8 % (ref 12.3–15.4)
GLOBULIN SER CALC-MCNC: 2.9 G/DL (ref 1.5–4.5)
GLUCOSE SERPL-MCNC: 86 MG/DL (ref 65–99)
HCT VFR BLD AUTO: 47.2 % (ref 37.5–51)
HDLC SERPL-MCNC: 31 MG/DL
HGB BLD-MCNC: 16 G/DL (ref 13–17.7)
IMM GRANULOCYTES # BLD AUTO: 0 X10E3/UL (ref 0–0.1)
IMM GRANULOCYTES NFR BLD AUTO: 0 %
LDLC SERPL CALC-MCNC: 94 MG/DL (ref 0–99)
LYMPHOCYTES # BLD AUTO: 1.8 X10E3/UL (ref 0.7–3.1)
LYMPHOCYTES NFR BLD AUTO: 27 %
MCH RBC QN AUTO: 30.3 PG (ref 26.6–33)
MCHC RBC AUTO-ENTMCNC: 33.9 G/DL (ref 31.5–35.7)
MCV RBC AUTO: 89 FL (ref 79–97)
MONOCYTES # BLD AUTO: 0.5 X10E3/UL (ref 0.1–0.9)
MONOCYTES NFR BLD AUTO: 7 %
NEUTROPHILS # BLD AUTO: 4.1 X10E3/UL (ref 1.4–7)
NEUTROPHILS NFR BLD AUTO: 62 %
PLATELET # BLD AUTO: 257 X10E3/UL (ref 150–450)
POTASSIUM SERPL-SCNC: 4.1 MMOL/L (ref 3.5–5.2)
PROT SERPL-MCNC: 7.9 G/DL (ref 6–8.5)
RBC # BLD AUTO: 5.28 X10E6/UL (ref 4.14–5.8)
SODIUM SERPL-SCNC: 141 MMOL/L (ref 134–144)
TRIGL SERPL-MCNC: 244 MG/DL (ref 0–149)
VLDLC SERPL CALC-MCNC: 49 MG/DL (ref 5–40)
WBC # BLD AUTO: 6.6 X10E3/UL (ref 3.4–10.8)

## 2020-02-07 ENCOUNTER — OFFICE VISIT (OUTPATIENT)
Dept: INTERNAL MEDICINE CLINIC | Age: 40
End: 2020-02-07

## 2020-02-07 VITALS
HEIGHT: 63 IN | HEART RATE: 75 BPM | OXYGEN SATURATION: 97 % | BODY MASS INDEX: 31.18 KG/M2 | SYSTOLIC BLOOD PRESSURE: 116 MMHG | WEIGHT: 176 LBS | RESPIRATION RATE: 16 BRPM | DIASTOLIC BLOOD PRESSURE: 73 MMHG | TEMPERATURE: 99 F

## 2020-02-07 DIAGNOSIS — I10 ESSENTIAL HYPERTENSION: ICD-10-CM

## 2020-02-07 DIAGNOSIS — R20.2 TINGLING IN EXTREMITIES: Primary | ICD-10-CM

## 2020-02-07 DIAGNOSIS — R73.09 ELEVATED HEMOGLOBIN A1C: ICD-10-CM

## 2020-02-07 DIAGNOSIS — E55.9 HYPOVITAMINOSIS D: ICD-10-CM

## 2020-02-07 DIAGNOSIS — T88.7XXA MEDICATION SIDE EFFECT: ICD-10-CM

## 2020-02-07 DIAGNOSIS — R53.83 FATIGUE, UNSPECIFIED TYPE: ICD-10-CM

## 2020-02-07 DIAGNOSIS — E53.8 LOW SERUM VITAMIN B12: ICD-10-CM

## 2020-02-07 NOTE — LETTER
2/10/2020 12:32 AM 
 
Mr. Toan Lopez 1964 [address] [facility] [id number] :  1980 Please see attached lab results. --Kidney and liver testing normal. 
Potassium is slightly lower than prior, but normal. 
Magnesium is normal. 
 
--Vitamin D is just below normal--make sure you are taking dose regularly. --Thyroid testing is normal (TSH and T4). 
 
--B12 level is normal--continue current dose of supplement. --Hemoglobin A1c is upper limit normal, and stable. Please let me know if you have other questions.  
 
Alma Diop MD

## 2020-02-07 NOTE — PROGRESS NOTES
History of Present Illness:   Marlen Carson is a 46 y.o. male here for evaluation:    Chief Complaint   Patient presents with    Dizziness     Feeling dizzy and weak. Occuring for the past 2 weeks.  Tingling     to left side of face and left lower extremity. Denies pain. Notes (nursing/rooming note copied below in italics):  None    Notes symptoms as above for past 2 weeks. Last labs reviewed from Aug and Dec 2019--normal.    LOV here with Dr. Germaine Petty Oct 2019. He notes symptoms above when he takes meds--amlodipine and HCTZ. He notes sometimes feels weakness. He notes tired in afternoon mainly. Nursing screenings reviewed by provider at visit. Prior to Admission medications    Medication Sig Start Date End Date Taking? Authorizing Provider   atorvastatin (LIPITOR) 40 mg tablet TAKE 1 TABLET BY MOUTH EVERY DAY 11/10/19  Yes Javier Anguiano MD   hydroCHLOROthiazide (HYDRODIURIL) 25 mg tablet TAKE 1 TABLET BY MOUTH EVERY DAY 11/10/19  Yes Javier Anguiano MD   amLODIPine (NORVASC) 10 mg tablet TAKE 1 TABLET BY MOUTH EVERY DAY 9/17/19  Yes Javier Anguiano MD   lidocaine (LIDOCAINE PAIN RELIEF) 4 % patch Apply to affected area as prescribed. 9/3/19  Yes Eagle Hines MD   cholecalciferol (VITAMIN D3) 2,000 unit cap capsule Take 2,000 Units by mouth daily. Take instead of weekly dose (50,000 units). 9/3/19  Yes Javier Anguiano MD   aspirin 81 mg chewable tablet TAKE 1 TABLET BY MOUTH EVERY DAY 6/13/19  Yes Javier Anguiano MD   cyanocobalamin 1,000 mcg tablet Take 1 Tab by mouth daily. 5/13/19  Yes Keyonna Lopez MD        ROS    Vitals:    02/07/20 1504   BP: 116/73   Pulse: 75   Resp: 16   Temp: 99 °F (37.2 °C)   TempSrc: Oral   SpO2: 97%   Weight: 176 lb (79.8 kg)   Height: 5' 3\" (1.6 m)   PainSc:   0 - No pain      Body mass index is 31.18 kg/m². Physical Exam:     Physical Exam  Vitals signs and nursing note reviewed.    Constitutional: General: He is not in acute distress. Appearance: Normal appearance. He is well-developed. He is not diaphoretic. HENT:      Head: Normocephalic and atraumatic. Mouth/Throat:      Mouth: Mucous membranes are moist.   Eyes:      General: No scleral icterus. Right eye: No discharge. Left eye: No discharge. Conjunctiva/sclera: Conjunctivae normal.   Cardiovascular:      Rate and Rhythm: Normal rate and regular rhythm. Pulses: Normal pulses. Heart sounds: Normal heart sounds. No murmur. No friction rub. No gallop. Pulmonary:      Effort: Pulmonary effort is normal. No respiratory distress. Breath sounds: Normal breath sounds. No stridor. No wheezing, rhonchi or rales. Abdominal:      General: Bowel sounds are normal. There is no distension. Palpations: Abdomen is soft. Tenderness: There is no abdominal tenderness. There is no guarding or rebound. Musculoskeletal:         General: No swelling, tenderness or deformity. Skin:     General: Skin is warm. Coloration: Skin is not jaundiced or pale. Findings: No bruising, erythema or rash. Neurological:      General: No focal deficit present. Mental Status: He is alert. Motor: No abnormal muscle tone. Coordination: Coordination normal.      Gait: Gait normal.   Psychiatric:         Mood and Affect: Mood normal.         Behavior: Behavior normal.         Thought Content: Thought content normal.         Judgment: Judgment normal.         Assessment and Plan:       ICD-10-CM ICD-9-CM    1. Tingling in extremities Q47.2 813.5 METABOLIC PANEL, COMPREHENSIVE      MAGNESIUM      TSH AND FREE T4   2. Medication side effect T88. 7XXA 173.83 METABOLIC PANEL, COMPREHENSIVE      MAGNESIUM   3. Elevated hemoglobin A1c R73.09 790.29 HEMOGLOBIN A1C WITH EAG   4. Hypovitaminosis D E55.9 268.9 VITAMIN D, 25 HYDROXY   5. Essential hypertension L09 144.8 METABOLIC PANEL, COMPREHENSIVE      MAGNESIUM   6. Low serum vitamin B12 E53.8 266.2 VITAMIN B12   7. Fatigue, unspecified type I13.10 579.33 METABOLIC PANEL, COMPREHENSIVE      MAGNESIUM      VITAMIN B12      VITAMIN D, 25 HYDROXY      TSH AND FREE T4      HEMOGLOBIN A1C WITH EAG       1,2,5:  Consider change to beta-blocker at follow-up as reviewed, based on labs. If K, Mg normal, consider trial of different medication. If K, Mg low, can replete and continue same medications. 3,4,6:  Monitoring labs reviewed at visit. Follow-up and Dispositions    · Return in about 2 weeks (around 2/21/2020) for lab review, medication follow-up.       lab results and schedule of future lab studies reviewed with patient  reviewed medications and side effects in detail    Plan and evaluation (above) reviewed with pt at visit  Patient voiced understanding of plan and provided with time to ask/review questions. After Visit Summary (AVS) provided to pt after visit with additional instructions as needed/reviewed.         Future Appointments   Date Time Provider Spring Fontaine   3/24/2020 11:15 AM Ana Lilia Brewer MD [facility]

## 2020-02-07 NOTE — PROGRESS NOTES
RM 18      Chief Complaint   Patient presents with    Dizziness     Feeling dizzy and weak. Occuring for the past 2 weeks.  Tingling     to left side of face and left lower extremity. Denies pain. 1. Have you been to the ER, urgent care clinic since your last visit? Hospitalized since your last visit? No    2. Have you seen or consulted any other health care providers outside of the [address] [place] [address] since your last visit? Include any pap smears or colon screening. No    There are no preventive care reminders to display for this patient. Abuse Screening Questionnaire 2/7/2020   Do you ever feel afraid of your partner? N   Are you in a relationship with someone who physically or mentally threatens you? N   Is it safe for you to go home?  Y     3 most recent PHQ Screens 2/7/2020   Little interest or pleasure in doing things Not at all   Feeling down, depressed, irritable, or hopeless Not at all   Total Score PHQ 2 0     Learning Assessment 2/7/2020   PRIMARY LEARNER Patient   BARRIERS PRIMARY LEARNER NONE   PRIMARY LANGUAGE ENGLISH   LEARNER PREFERENCE PRIMARY READING   ANSWERED BY patient   RELATIONSHIP SELF

## 2020-02-07 NOTE — LETTER
NOTIFICATION RETURN TO WORK / SCHOOL 
 
2/7/2020 3:48 PM 
 
Mr. Marlen Carson 1964 [place] [address] To Whom It May Concern: 
 
Marlen Carson is currently under the care of [facility]. He will return to work/school on: 2/7/20 If there are questions or concerns please have the patient contact our office.  
 
 
 
Sincerely, 
 
 
Kayla Mccullough MD

## 2020-02-08 LAB
25(OH)D3+25(OH)D2 SERPL-MCNC: 29 NG/ML (ref 30–100)
ALBUMIN SERPL-MCNC: 4.5 G/DL (ref 4–5)
ALBUMIN/GLOB SERPL: 1.5 {RATIO} (ref 1.2–2.2)
ALP SERPL-CCNC: 71 IU/L (ref 39–117)
ALT SERPL-CCNC: 42 IU/L (ref 0–44)
AST SERPL-CCNC: 27 IU/L (ref 0–40)
BILIRUB SERPL-MCNC: 0.8 MG/DL (ref 0–1.2)
BUN SERPL-MCNC: 8 MG/DL (ref 6–20)
BUN/CREAT SERPL: 9 (ref 9–20)
CALCIUM SERPL-MCNC: 9.3 MG/DL (ref 8.7–10.2)
CHLORIDE SERPL-SCNC: 101 MMOL/L (ref 96–106)
CO2 SERPL-SCNC: 26 MMOL/L (ref 20–29)
CREAT SERPL-MCNC: 0.92 MG/DL (ref 0.76–1.27)
EST. AVERAGE GLUCOSE BLD GHB EST-MCNC: 114 MG/DL
GLOBULIN SER CALC-MCNC: 3 G/DL (ref 1.5–4.5)
GLUCOSE SERPL-MCNC: 118 MG/DL (ref 65–99)
HBA1C MFR BLD: 5.6 % (ref 4.8–5.6)
MAGNESIUM SERPL-MCNC: 2.2 MG/DL (ref 1.6–2.3)
POTASSIUM SERPL-SCNC: 3.5 MMOL/L (ref 3.5–5.2)
PROT SERPL-MCNC: 7.5 G/DL (ref 6–8.5)
SODIUM SERPL-SCNC: 138 MMOL/L (ref 134–144)
T4 FREE SERPL-MCNC: 1.23 NG/DL (ref 0.82–1.77)
TSH SERPL DL<=0.005 MIU/L-ACNC: 2.07 UIU/ML (ref 0.45–4.5)
VIT B12 SERPL-MCNC: 786 PG/ML (ref 232–1245)

## 2020-02-11 ENCOUNTER — DOCUMENTATION ONLY (OUTPATIENT)
Dept: INTERNAL MEDICINE CLINIC | Age: 40
End: 2020-02-11

## 2020-02-25 ENCOUNTER — OFFICE VISIT (OUTPATIENT)
Dept: INTERNAL MEDICINE CLINIC | Age: 40
End: 2020-02-25

## 2020-02-25 VITALS
WEIGHT: 176 LBS | DIASTOLIC BLOOD PRESSURE: 81 MMHG | TEMPERATURE: 98 F | RESPIRATION RATE: 16 BRPM | SYSTOLIC BLOOD PRESSURE: 142 MMHG | OXYGEN SATURATION: 98 % | HEIGHT: 63 IN | HEART RATE: 70 BPM | BODY MASS INDEX: 31.18 KG/M2

## 2020-02-25 DIAGNOSIS — I10 ESSENTIAL HYPERTENSION: Primary | ICD-10-CM

## 2020-02-25 DIAGNOSIS — R42 DIZZINESS: ICD-10-CM

## 2020-02-25 DIAGNOSIS — Z79.899 LONG TERM CURRENT USE OF DIURETIC: ICD-10-CM

## 2020-02-25 NOTE — PATIENT INSTRUCTIONS
Potassium-Rich Diet: Care Instructions  Your Care Instructions    Potassium is a mineral. It helps keep the right mix of fluids in your body. It also helps your nerves and muscles work as they should. You'll find it in milk and meats. It's also in all fresh foods, including fruits and vegetables. Most adults need about 5 grams of potassium a day. The foods you eat should supply all that you need. Some health conditions can cause a loss of potassium. For example, kidney problems and stomach problems with vomiting and diarrhea can cause you to lose this mineral. Some medicines, such as water pills (diuretics), can cause low potassium. If you can't get enough potassium from what you eat, your doctor may advise you to take supplements. Follow-up care is a key part of your treatment and safety. Be sure to make and go to all appointments, and call your doctor if you are having problems. It's also a good idea to know your test results and keep a list of the medicines you take. How can you care for yourself at home? · Plan your diet around foods that are rich in potassium. Fresh, unprocessed whole foods have the most. These foods include:  ? Milk and other dairy products. ? Vegetables, especially broccoli, cooked dry beans, tomatoes, potatoes, artichokes, winter squash, and spinach. ? Fruits, especially citrus fruits, bananas, and apricots. Dried apricots contain more potassium than fresh apricots. ? Meat, poultry, and fish. · Ask your doctor about using a salt substitute or \"light\" salt. These often contain potassium. Where can you learn more? Go to http://joby-carter.info/. Enter H315 in the search box to learn more about \"Potassium-Rich Diet: Care Instructions. \"  Current as of: November 7, 2018  Content Version: 12.2  © 9501-9904 [employer]. Care instructions adapted under license by [employer] (which disclaims liability or warranty for this information).  If you have questions about a medical condition or this instruction, always ask your healthcare professional. [place] [address] any warranty or liability for your use of this information.

## 2020-02-25 NOTE — PROGRESS NOTES
History of Present Illness:   Maurice Haynes is a 46 y.o. male here for evaluation:    Chief Complaint   Patient presents with    Labs     review    Medication Evaluation     2 week follow up        Notes (nursing/rooming note copied below in italics):  As above. LOV here Feb 7th. Labs from that visit reviewed. Letter sent 2/10--reviewed. Reviewed labs and low-normal potassium. Notes symptoms have improved. Notes no chagne in BP medications or dosing. He continues to take in AM.      Nursing screenings reviewed by provider at visit. Prior to Admission medications    Medication Sig Start Date End Date Taking? Authorizing Provider   atorvastatin (LIPITOR) 40 mg tablet TAKE 1 TABLET BY MOUTH EVERY DAY 11/10/19  Yes Arnaldo Bustillo MD   hydroCHLOROthiazide (HYDRODIURIL) 25 mg tablet TAKE 1 TABLET BY MOUTH EVERY DAY 11/10/19  Yes Arnaldo Bustillo MD   amLODIPine (NORVASC) 10 mg tablet TAKE 1 TABLET BY MOUTH EVERY DAY 9/17/19  Yes Arnaldo Bustillo MD   lidocaine (LIDOCAINE PAIN RELIEF) 4 % patch Apply to affected area as prescribed. 9/3/19  Yes Maryuri Roth MD   cholecalciferol (VITAMIN D3) 2,000 unit cap capsule Take 2,000 Units by mouth daily. Take instead of weekly dose (50,000 units). 9/3/19  Yes Arnaldo Bustillo MD   aspirin 81 mg chewable tablet TAKE 1 TABLET BY MOUTH EVERY DAY 6/13/19  Yes Arnaldo Bustillo MD   cyanocobalamin 1,000 mcg tablet Take 1 Tab by mouth daily. 5/13/19  Yes Andie Blanchard MD        ROS    Vitals:    02/25/20 1352   BP: 142/81   Pulse: 70   Resp: 16   Temp: 98 °F (36.7 °C)   TempSrc: Oral   SpO2: 98%   Weight: 176 lb (79.8 kg)   Height: 5' 3\" (1.6 m)   PainSc:   7   PainLoc: Finger      Body mass index is 31.18 kg/m². Physical Exam:     Physical Exam  Vitals signs and nursing note reviewed. Constitutional:       General: He is not in acute distress. Appearance: Normal appearance. He is well-developed.  He is not diaphoretic. HENT:      Head: Normocephalic and atraumatic. Mouth/Throat:      Mouth: Mucous membranes are moist.   Eyes:      General: No scleral icterus. Right eye: No discharge. Left eye: No discharge. Conjunctiva/sclera: Conjunctivae normal.   Cardiovascular:      Rate and Rhythm: Normal rate and regular rhythm. Pulses: Normal pulses. Heart sounds: Normal heart sounds. No murmur. No friction rub. No gallop. Pulmonary:      Effort: Pulmonary effort is normal. No respiratory distress. Breath sounds: Normal breath sounds. No stridor. No wheezing, rhonchi or rales. Abdominal:      General: Bowel sounds are normal. There is no distension. Palpations: Abdomen is soft. Tenderness: There is no abdominal tenderness. There is no guarding or rebound. Musculoskeletal:         General: No swelling, tenderness or deformity. Skin:     General: Skin is warm. Coloration: Skin is not jaundiced or pale. Findings: No bruising, erythema or rash. Neurological:      General: No focal deficit present. Mental Status: He is alert. Motor: No abnormal muscle tone. Coordination: Coordination normal.      Gait: Gait normal.   Psychiatric:         Mood and Affect: Mood normal.         Behavior: Behavior normal.         Thought Content: Thought content normal.         Judgment: Judgment normal.         Assessment and Plan:       ICD-10-CM ICD-9-CM    1. Essential hypertension I10 401.9    2. Dizziness R42 780.4    3. Long term current use of diuretic Z79.899 V58.69        1. Continue current medications. 2,3:  Reviewed could be due to mild hypokalemia--K 3.5 with last labs, and on BP meds as above. Reviewed slight increase in potassium in diet and monitoring at follow-up. Dizziness improved/resolved at this time, without changes in diet, hydration or medication.       Follow-up and Dispositions    · Return in about 3 months (around 5/25/2020) for fasting labs, Blood Pressure follow-up.       lab results and schedule of future lab studies reviewed with patient  reviewed medications and side effects in detail    Plan and evaluation (above) reviewed with pt at visit  Patient voiced understanding of plan and provided with time to ask/review questions. After Visit Summary (AVS) provided to pt after visit with additional instructions as needed/reviewed.         Future Appointments   Date Time Provider Spring Fontaine   3/24/2020 11:15 AM Jimenez Pak MD [facility]

## 2020-02-25 NOTE — PROGRESS NOTES
RM 17    Chief Complaint   Patient presents with    Labs     review    Medication Evaluation     2 week follow up        1. Have you been to the ER, urgent care clinic since your last visit? Hospitalized since your last visit? No    2. Have you seen or consulted any other health care providers outside of the [address] [place] [address] since your last visit? Include any pap smears or colon screening. No    There are no preventive care reminders to display for this patient. Abuse Screening Questionnaire 2/25/2020   Do you ever feel afraid of your partner? N   Are you in a relationship with someone who physically or mentally threatens you? N   Is it safe for you to go home?  Y       3 most recent PHQ Screens 2/25/2020   Little interest or pleasure in doing things Not at all   Feeling down, depressed, irritable, or hopeless Not at all   Total Score PHQ 2 0

## 2020-04-20 ENCOUNTER — VIRTUAL VISIT (OUTPATIENT)
Dept: INTERNAL MEDICINE CLINIC | Age: 40
End: 2020-04-20

## 2020-04-20 DIAGNOSIS — K59.00 CONSTIPATION, UNSPECIFIED CONSTIPATION TYPE: Primary | ICD-10-CM

## 2020-04-20 DIAGNOSIS — I10 ESSENTIAL HYPERTENSION: ICD-10-CM

## 2020-04-20 RX ORDER — POLYETHYLENE GLYCOL 3350 17 G/17G
34 POWDER, FOR SOLUTION ORAL 2 TIMES DAILY
Qty: 595 G | Refills: 2 | Status: SHIPPED | OUTPATIENT
Start: 2020-04-20 | End: 2020-04-27

## 2020-04-20 RX ORDER — GLYCERIN ADULT
1 SUPPOSITORY, RECTAL RECTAL
Qty: 15 SUPPOSITORY | Refills: 2 | Status: SHIPPED | OUTPATIENT
Start: 2020-04-20

## 2020-04-20 NOTE — PATIENT INSTRUCTIONS
Goal with Miralax is to have 2-3 soft stools daily. You can decrease use to daily or as needed once stools softer and regular. You can work on diet/fiber as below, once constipation has improved. Constipation: Care Instructions Your Care Instructions Constipation means that you have a hard time passing stools (bowel movements). People pass stools from 3 times a day to once every 3 days. What is normal for you may be different. Constipation may occur with pain in the rectum and cramping. The pain may get worse when you try to pass stools. Sometimes there are small amounts of bright red blood on toilet paper or the surface of stools. This is because of enlarged veins near the rectum (hemorrhoids). A few changes in your diet and lifestyle may help you avoid ongoing constipation. Your doctor may also prescribe medicine to help loosen your stool. Some medicines can cause constipation. These include pain medicines and antidepressants. Tell your doctor about all the medicines you take. Your doctor may want to make a medicine change to ease your symptoms. Follow-up care is a key part of your treatment and safety. Be sure to make and go to all appointments, and call your doctor if you are having problems. It's also a good idea to know your test results and keep a list of the medicines you take. How can you care for yourself at home? · Drink plenty of fluids, enough so that your urine is light yellow or clear like water. If you have kidney, heart, or liver disease and have to limit fluids, talk with your doctor before you increase the amount of fluids you drink. · Include high-fiber foods in your diet each day. These include fruits, vegetables, beans, and whole grains. · Get at least 30 minutes of exercise on most days of the week. Walking is a good choice. You also may want to do other activities, such as running, swimming, cycling, or playing tennis or team sports. · Take a fiber supplement, such as Citrucel or Metamucil, every day. Read and follow all instructions on the label. · Schedule time each day for a bowel movement. A daily routine may help. Take your time having your bowel movement. · Support your feet with a small step stool when you sit on the toilet. This helps flex your hips and places your pelvis in a squatting position. · Your doctor may recommend an over-the-counter laxative to relieve your constipation. Examples are Milk of Magnesia and MiraLax. Read and follow all instructions on the label. Do not use laxatives on a long-term basis. When should you call for help? Call your doctor now or seek immediate medical care if: 
  · You have new or worse belly pain.  
  · You have new or worse nausea or vomiting.  
  · You have blood in your stools.  
 Watch closely for changes in your health, and be sure to contact your doctor if: 
  · Your constipation is getting worse.  
  · You do not get better as expected. Where can you learn more? Go to http://joby-carter.info/ Enter 21 [phone] in the search box to learn more about \"Constipation: Care Instructions. \" Current as of: June 26, 2019Content Version: 12.4 © 2749-6510 [employer]. Care instructions adapted under license by [employer] (which disclaims liability or warranty for this information). If you have questions about a medical condition or this instruction, always ask your healthcare professional. [place] [address] any warranty or liability for your use of this information.

## 2020-04-20 NOTE — PROGRESS NOTES
John Medina is a 46 y.o. male who was seen by synchronous (real-time) audio-video technology on 4/20/2020. Consent:  He and/or his healthcare decision maker is aware that this patient-initiated Telehealth encounter is a billable service, with coverage as determined by his insurance carrier. He is aware that he may receive a bill and has provided verbal consent to proceed: Yes    I was in the office while conducting this encounter. Subjective:   Mickey FELIZ Germain was seen for Constipation (occuring for the past 10 days. Denies pain, but feels uncomfortable. )      Notes:  He has had problems going to bathroom   He has had hard, painful bowel movements for past 7-10 days. He had used warm water to try to help and did somewhat, but not completely. Reviewed mgt as below      ROS      Allergies   Allergen Reactions    Lisinopril Angioedema     Lips had tingling/numbness       Prior to Admission medications    Medication Sig Start Date End Date Taking? Authorizing Provider   atorvastatin (LIPITOR) 40 mg tablet TAKE 1 TABLET BY MOUTH EVERY DAY 11/10/19  Yes Ari Carty MD   hydroCHLOROthiazide (HYDRODIURIL) 25 mg tablet TAKE 1 TABLET BY MOUTH EVERY DAY 11/10/19  Yes Ari Carty MD   amLODIPine (NORVASC) 10 mg tablet TAKE 1 TABLET BY MOUTH EVERY DAY 9/17/19  Yes Ari Carty MD   lidocaine (LIDOCAINE PAIN RELIEF) 4 % patch Apply to affected area as prescribed. 9/3/19  Yes Alec Ramon MD   cholecalciferol (VITAMIN D3) 2,000 unit cap capsule Take 2,000 Units by mouth daily. Take instead of weekly dose (50,000 units). 9/3/19  Yes Ari Carty MD   aspirin 81 mg chewable tablet TAKE 1 TABLET BY MOUTH EVERY DAY 6/13/19  Yes Ari Carty MD   cyanocobalamin 1,000 mcg tablet Take 1 Tab by mouth daily.  5/13/19  Yes Bee Aquino MD     Allergies   Allergen Reactions    Lisinopril Angioedema     Lips had tingling/numbness         PHYSICAL EXAMINATION:    Vital Signs: There were no vitals taken for this visit. Constitutional: [x] Appears well-developed and well-nourished [x] No apparent distress      Mental status: [x] Alert and awake  [x] Oriented [x] Able to follow commands       Eyes:   EOM    [x]  Normal      Sclera  [x]  Normal              Discharge [x]  None visible       HENT: [x] Normocephalic, atraumatic    [x] Mouth/Throat: Mucous membranes are moist    External Ears [x] Normal      Neck: [x] No visualized mass     Pulmonary/Chest: [x] Respiratory effort normal   [x] No visualized signs of difficulty breathing or respiratory distress    Musculoskeletal:  [x] Normal range of motion of neck    Neurological:        [x] No Facial Asymmetry (Cranial nerve 7 motor function) (limited exam due to video visit)          [x] No gaze palsy     Skin:        [x] No significant exanthematous lesions or discoloration noted on facial skin             Psychiatric:       [x] Normal Affect       Other pertinent observable physical exam findings:  None. We discussed the expected course, resolution and complications of the diagnosis(es) in detail. Medication risks, benefits, costs, interactions, and alternatives were discussed as indicated. I advised him to contact the office if his condition worsens, changes or fails to improve as anticipated. He expressed understanding with the diagnosis(es) and plan. Pursuant to the emergency declaration under the [facility] waiver authority and the [employer] Act, this Virtual  Visit was conducted, with patient's consent, to reduce the patient's risk of exposure to COVID-19 and provide continuity of care for an established patient. Services were provided through a video synchronous discussion virtually to substitute for in-person clinic visit.       Assessment & Plan:   Diagnoses and all orders for this visit:      ICD-10-CM ICD-9-CM    1. Constipation, unspecified constipation type K59.00 564.00 polyethylene glycol (MIRALAX) 17 gram/dose powder      glycerin, adult, suppository   2. Essential hypertension I10 401.9        1. Medication(s), management and follow-up based on response reviewed at visit. 2.  Continue current medication mgt with follow-up as below. Follow-up and Dispositions    · Return if symptoms worsen or fail to improve, for as scheduled, Blood Pressure follow-up, medication follow-up.       reviewed medications and side effects in detail    For additional documentation of information and/or recommendations discussed this visit, please see notes in instructions. Plan and evaluation (above) reviewed with pt at visit  Patient voiced understanding of plan and provided with time to ask/review questions. After Visit Summary (AVS) provided to pt after visit with additional instructions as needed/reviewed. AVS:  []  Sent to patient as iMedicaret message after visit. [x]  Mailed to patient after visit. []  Not sent to patient after visit.       Future Appointments   Date Time Provider [place]   5/26/2020 11:15 AM John Ford MD [address] [facility]

## 2020-04-20 NOTE — PROGRESS NOTES
Virtual visit-doxy video    Chief Complaint   Patient presents with    Constipation     occuring for the past 10 days. Denies pain, but feels uncomfortable. 1. Have you been to the ER, urgent care clinic since your last visit? Hospitalized since your last visit? No    2. Have you seen or consulted any other health care providers outside of the [address] [place] [address] since your last visit? Include any pap smears or colon screening. No    There are no preventive care reminders to display for this patient. 3 most recent PHQ Screens 2/25/2020   Little interest or pleasure in doing things Not at all   Feeling down, depressed, irritable, or hopeless Not at all   Total Score PHQ 2 0     Abuse Screening Questionnaire 4/20/2020   Do you ever feel afraid of your partner? N   Are you in a relationship with someone who physically or mentally threatens you? N   Is it safe for you to go home?  Roni Sosa

## 2020-05-15 DIAGNOSIS — E53.8 LOW SERUM VITAMIN B12: ICD-10-CM

## 2020-05-18 RX ORDER — LANOLIN ALCOHOL/MO/W.PET/CERES
CREAM (GRAM) TOPICAL
Qty: 30 TAB | Refills: 14 | Status: SHIPPED | OUTPATIENT
Start: 2020-05-18 | End: 2020-05-26 | Stop reason: SDUPTHER

## 2020-05-26 ENCOUNTER — VIRTUAL VISIT (OUTPATIENT)
Dept: INTERNAL MEDICINE CLINIC | Age: 40
End: 2020-05-26

## 2020-05-26 DIAGNOSIS — R73.09 ELEVATED HEMOGLOBIN A1C: ICD-10-CM

## 2020-05-26 DIAGNOSIS — Z86.73 HISTORY OF CVA (CEREBROVASCULAR ACCIDENT): ICD-10-CM

## 2020-05-26 DIAGNOSIS — I10 ESSENTIAL HYPERTENSION: Primary | ICD-10-CM

## 2020-05-26 DIAGNOSIS — E55.9 HYPOVITAMINOSIS D: ICD-10-CM

## 2020-05-26 DIAGNOSIS — E53.8 LOW SERUM VITAMIN B12: ICD-10-CM

## 2020-05-26 RX ORDER — HYDROCHLOROTHIAZIDE 25 MG/1
TABLET ORAL
Qty: 30 TAB | Refills: 5 | Status: SHIPPED | OUTPATIENT
Start: 2020-05-26

## 2020-05-26 RX ORDER — ATORVASTATIN CALCIUM 40 MG/1
TABLET, FILM COATED ORAL
Qty: 30 TAB | Refills: 5 | Status: SHIPPED | OUTPATIENT
Start: 2020-05-26

## 2020-05-26 RX ORDER — LANOLIN ALCOHOL/MO/W.PET/CERES
CREAM (GRAM) TOPICAL
Qty: 30 TAB | Refills: 11 | Status: SHIPPED | OUTPATIENT
Start: 2020-05-26

## 2020-05-26 RX ORDER — AMLODIPINE BESYLATE 10 MG/1
TABLET ORAL
Qty: 30 TAB | Refills: 5 | Status: SHIPPED | OUTPATIENT
Start: 2020-05-26

## 2020-05-26 RX ORDER — ACETAMINOPHEN 500 MG
2000 TABLET ORAL DAILY
Qty: 30 CAP | Refills: 5 | Status: SHIPPED | OUTPATIENT
Start: 2020-05-26

## 2020-05-26 RX ORDER — NICOTINE POLACRILEX 2 MG
LOZENGE BUCCAL
COMMUNITY
Start: 2020-05-15

## 2020-05-26 NOTE — PROGRESS NOTES
Blake Martell is a 45 y.o. male who was seen by synchronous (real-time) audio-video technology on 5/26/2020. Consent: Blake Martell, who was seen by synchronous (real-time) audio-video technology, and/or his healthcare decision maker, is aware that this patient-initiated, Telehealth encounter on 5/26/2020 is a billable service, with coverage as determined by his insurance carrier. He is aware that he may receive a bill and has provided verbal consent to proceed: Yes. I was in the office while conducting this encounter. Subjective:   Mickey TRAY Groves was seen for Hypertension (f/u)      Notes:  No interim problems. No problems with meds. Refills reviewed. He prefers 30-day supplies instead of 90-day. Reviewed he can request this from pharmacy, but will write refills for today as 30-day as requested. He is taking BP and other meds without problems. Last non-fasting labs Feb 2020. Prefers to just update labs with next visit. Plan update here at follow-up--fasting labs. Nursing screenings reviewed by provider at visit. Allergies   Allergen Reactions    Lisinopril Angioedema     Lips had tingling/numbness       Prior to Admission medications    Medication Sig Start Date End Date Taking? Authorizing Provider   Purelax 17 gram/dose powder PLEASE SEE ATTACHED FOR DETAILED DIRECTIONS 5/15/20  Yes Provider, Historical   cyanocobalamin 1,000 mcg tablet TAKE 1 TABLET BY MOUTH EVERY DAY 5/18/20  Yes Jaime Russo MD   atorvastatin (LIPITOR) 40 mg tablet TAKE 1 TABLET BY MOUTH EVERY DAY 11/10/19  Yes Pb Soto MD   hydroCHLOROthiazide (HYDRODIURIL) 25 mg tablet TAKE 1 TABLET BY MOUTH EVERY DAY 11/10/19  Yes Pb Soto MD   amLODIPine (NORVASC) 10 mg tablet TAKE 1 TABLET BY MOUTH EVERY DAY 9/17/19  Yes Pb Soto MD   cholecalciferol (VITAMIN D3) 2,000 unit cap capsule Take 2,000 Units by mouth daily. Take instead of weekly dose (50,000 units). 9/3/19  Yes Kuldeep Alberto MD   aspirin 81 mg chewable tablet TAKE 1 TABLET BY MOUTH EVERY DAY 6/13/19  Yes Kuldeep Alberto MD   glycerin, adult, suppository Insert 1 Suppository into rectum every six (6) hours as needed for Other (constipation/hard stool). 4/20/20   Kuldeep Alberto MD   lidocaine (LIDOCAINE PAIN RELIEF) 4 % patch Apply to affected area as prescribed. 9/3/19   Shailesh Beck MD         ROS    PHYSICAL EXAMINATION:    Vital Signs: There were no vitals taken for this visit. Not monitoring BP at home. Constitutional: [x] Appears well-developed and well-nourished [x] No apparent distress      Mental status: [x] Alert and awake  [x] Oriented [x] Able to follow commands       Eyes:   EOM    [x]  Normal      Sclera  [x]  Normal              Discharge [x]  None visible       HENT: [x] Normocephalic, atraumatic    [x] Mouth/Throat: Mucous membranes are moist    External Ears [x] Normal      Neck: [x] No visualized mass     Pulmonary/Chest: [x] Respiratory effort normal   [x] No visualized signs of difficulty breathing or respiratory distress    Musculoskeletal:  [x] Normal range of motion of neck    Neurological:        [x] No Facial Asymmetry (Cranial nerve 7 motor function) (limited exam due to video visit)          [x] No gaze palsy     Skin:        [x] No significant exanthematous lesions or discoloration noted on facial skin             Psychiatric:       [x] Normal Affect       Other pertinent observable physical exam findings:  None. We discussed the expected course, resolution and complications of the diagnosis(es) in detail. Medication risks, benefits, costs, interactions, and alternatives were discussed as indicated. I advised him to contact the office if his condition worsens, changes or fails to improve as anticipated. He expressed understanding with the diagnosis(es) and plan.      Chadd Hays is a 45 y.o. male who was evaluated by a video visit encounter for concerns as above. Patient identification was verified prior to start of the visit. A caregiver was present when appropriate. Due to this being a TeleHealth encounter (During TGYYB-59 public health emergency), evaluation of the following organ systems was limited: Vitals/Constitutional/EENT/Resp/CV/GI//MS/Neuro/Skin/Heme-Lymph-Imm. Pursuant to the emergency declaration under the [address] [place] [address] waiver authority and the [employer] and [employer] Act, this Virtual  Visit was conducted, with patient's (and/or legal guardian's) consent, to reduce the patient's risk of exposure to COVID-19 and provide necessary medical care. Services were provided through a video synchronous discussion virtually to substitute for in-person clinic visit. Patient and provider were located at their individual homes. Assessment & Plan:   Diagnoses and all orders for this visit:      ICD-10-CM ICD-9-CM    1. Essential hypertension I10 401.9 amLODIPine (NORVASC) 10 mg tablet      hydroCHLOROthiazide (HYDRODIURIL) 25 mg tablet      atorvastatin (LIPITOR) 40 mg tablet   2. Low serum vitamin B12 E53.8 266.2 cyanocobalamin 1,000 mcg tablet   3. Elevated hemoglobin A1c R73.09 790.29 atorvastatin (LIPITOR) 40 mg tablet   4. Hypovitaminosis D E55.9 268.9 cholecalciferol (VITAMIN D3) (2,000 UNITS /50 MCG) cap capsule   5. History of CVA (cerebrovascular accident) Z86.73 V12.54 atorvastatin (LIPITOR) 40 mg tablet       1,3,5. Refills reviewed as above.    2,4:  Continue current doses of supplements. B12 normal Feb 2020--plan yearly monitoring. Monitor Vit D at follow-up with next labs. Follow-up and Dispositions    · Return in about 3 months (around 8/26/2020), or if symptoms worsen or fail to improve, for medication follow-up, Blood Pressure follow-up, fasting labs.        lab results and schedule of future lab studies reviewed with patient  reviewed medications and side effects in detail    Plan and evaluation (above) reviewed with pt at visit  Patient voiced understanding of plan and provided with time to ask/review questions. After Visit Summary (AVS) provided to pt after visit with additional instructions as needed/reviewed. AVS:  []  Sent to patient as MyChart message after visit. [x]  Mailed to patient after visit. []  Not sent to patient after visit. No future appointments.

## 2020-06-11 DIAGNOSIS — Z86.73 HISTORY OF CVA (CEREBROVASCULAR ACCIDENT): ICD-10-CM

## 2020-06-11 NOTE — TELEPHONE ENCOUNTER
Last visit 05/26/2020 Virtual visit MD Boo Luo   Next appointment 3 months (8/2020)   Previous refill encounter(s) 06/13/2019 [facility] Aspirin 81 mg #30 with 5 refills. Requested Prescriptions     Pending Prescriptions Disp Refills    aspirin 81 mg chewable tablet 90 Tab 1     Sig: Take 1 Tab by mouth daily.

## 2020-06-13 RX ORDER — GUAIFENESIN 100 MG/5ML
81 LIQUID (ML) ORAL DAILY
Qty: 90 TAB | Refills: 3 | Status: SHIPPED | OUTPATIENT
Start: 2020-06-13

## 2020-06-13 NOTE — TELEPHONE ENCOUNTER
Refill request(s) approved--[facility]. Diagnoses and all orders for this visit:    1. History of CVA (cerebrovascular accident)  -     aspirin 81 mg chewable tablet; Take 1 Tab by mouth daily. Indications: stroke prevention, History of CVA.
